# Patient Record
Sex: MALE | Race: WHITE | Employment: FULL TIME | URBAN - METROPOLITAN AREA
[De-identification: names, ages, dates, MRNs, and addresses within clinical notes are randomized per-mention and may not be internally consistent; named-entity substitution may affect disease eponyms.]

---

## 2022-11-30 ENCOUNTER — OFFICE VISIT (OUTPATIENT)
Dept: FAMILY MEDICINE CLINIC | Age: 32
End: 2022-11-30
Payer: COMMERCIAL

## 2022-11-30 VITALS
WEIGHT: 197 LBS | OXYGEN SATURATION: 98 % | DIASTOLIC BLOOD PRESSURE: 64 MMHG | BODY MASS INDEX: 29.18 KG/M2 | HEIGHT: 69 IN | HEART RATE: 112 BPM | SYSTOLIC BLOOD PRESSURE: 120 MMHG

## 2022-11-30 DIAGNOSIS — M25.50 CHRONIC PAIN OF MULTIPLE JOINTS: ICD-10-CM

## 2022-11-30 DIAGNOSIS — F17.200 VAPING NICOTINE DEPENDENCE, NON-TOBACCO PRODUCT: ICD-10-CM

## 2022-11-30 DIAGNOSIS — Z91.018 FOOD ALLERGY: Primary | ICD-10-CM

## 2022-11-30 DIAGNOSIS — G89.29 CHRONIC PAIN OF MULTIPLE JOINTS: ICD-10-CM

## 2022-11-30 DIAGNOSIS — Z87.891 FORMER SMOKER: ICD-10-CM

## 2022-11-30 DIAGNOSIS — R06.83 SNORING: ICD-10-CM

## 2022-11-30 DIAGNOSIS — Z97.3 WEARS GLASSES: ICD-10-CM

## 2022-11-30 PROCEDURE — 99205 OFFICE O/P NEW HI 60 MIN: CPT | Performed by: STUDENT IN AN ORGANIZED HEALTH CARE EDUCATION/TRAINING PROGRAM

## 2022-11-30 RX ORDER — FEXOFENADINE HCL 180 MG/1
TABLET ORAL DAILY
COMMUNITY
Start: 2017-11-30

## 2022-11-30 RX ORDER — FLUTICASONE PROPIONATE 50 MCG
2 SPRAY, SUSPENSION (ML) NASAL DAILY
Qty: 16 G | Refills: 0 | Status: SHIPPED | OUTPATIENT
Start: 2022-11-30

## 2022-11-30 SDOH — HEALTH STABILITY: PHYSICAL HEALTH: ON AVERAGE, HOW MANY DAYS PER WEEK DO YOU ENGAGE IN MODERATE TO STRENUOUS EXERCISE (LIKE A BRISK WALK)?: 5 DAYS

## 2022-11-30 SDOH — HEALTH STABILITY: PHYSICAL HEALTH: ON AVERAGE, HOW MANY MINUTES DO YOU ENGAGE IN EXERCISE AT THIS LEVEL?: 60 MIN

## 2022-11-30 ASSESSMENT — SOCIAL DETERMINANTS OF HEALTH (SDOH)
WITHIN THE LAST YEAR, HAVE TO BEEN RAPED OR FORCED TO HAVE ANY KIND OF SEXUAL ACTIVITY BY YOUR PARTNER OR EX-PARTNER?: NO
WITHIN THE LAST YEAR, HAVE YOU BEEN KICKED, HIT, SLAPPED, OR OTHERWISE PHYSICALLY HURT BY YOUR PARTNER OR EX-PARTNER?: NO
WITHIN THE LAST YEAR, HAVE YOU BEEN AFRAID OF YOUR PARTNER OR EX-PARTNER?: NO
WITHIN THE LAST YEAR, HAVE YOU BEEN HUMILIATED OR EMOTIONALLY ABUSED IN OTHER WAYS BY YOUR PARTNER OR EX-PARTNER?: NO

## 2022-11-30 ASSESSMENT — PATIENT HEALTH QUESTIONNAIRE - PHQ9
SUM OF ALL RESPONSES TO PHQ QUESTIONS 1-9: 2
SUM OF ALL RESPONSES TO PHQ9 QUESTIONS 1 & 2: 2
SUM OF ALL RESPONSES TO PHQ QUESTIONS 1-9: 2
1. LITTLE INTEREST OR PLEASURE IN DOING THINGS: 1
2. FEELING DOWN, DEPRESSED OR HOPELESS: 1

## 2022-11-30 NOTE — PATIENT INSTRUCTIONS
Can take Naproxen 1 tablet twice a day as needed for aches and pains. Can also consider taking ibuprofen 400mg every 6 hours as needed for pain    Please make an appointment with one of our [address], Dr. Wilton Rodriguez or Audrey Bender. They will work with you to develop a plan to improve your overall well-being by addressing any behavioral or mental health factors that are influencing your health. You can schedule your appointment just like you schedule your other appointments here, at the  or over the phone. Each appointment will be 30 minutes long, and you will typically be seen every 2-4 weeks. Your insurance should cover the visit, but you may owe a specialist copay. If you would prefer to be seen by a therapist more frequently, or for a longer visit, please ask your Primary Care Provider for a recommendation.

## 2022-11-30 NOTE — PROGRESS NOTES
[facility]  Establish care visit   2022    Loan Baez (:  1990) is a 34 y.o. male, here to establish care. Chief Complaint   Patient presents with    New Patient        ASSESSMENT/ PLAN  1. Food allergy  See below  Pt would like definitive testing for allergy. Discussed options of skin testing vs blood tests in general advantages and disadvantages. Will defer further management to specialist  - Viviana Feliciano MD, Allergy & Immunology, [place]    2. Chronic pain of multiple joints  Take ibuprofen 400mg q6h prn for pain or OTC naproxen bid prn. Pt to take his medication at home    3. Former smoker  4. Vaping nicotine dependence, non-tobacco product  Issues with oral fixation and hands more than the nicotine itself. Referred to behavioral health for further assistance. Can revisit discussion for NRT at this time. Pt is contemplative with NRT at this time. Did some MI myself about smoking. Suggested some healthier snacks and fidget cubes even but I think further therapy will be beneficial. Plus he is wanting ADHD diagnosis. No prior ADHD diagnosis. 5. Wears glasses  Has a spot on the sclera without blurry vision or visual changes. Has not seen eye in a while  - [facility], , Optometry, [facility]    6. Snoring  Likely 2/2 nasal congestion. Will benefit with STOP BANG if not improving and further maybe PSG  - fluticasone (FLONASE) 50 MCG/ACT nasal spray; 2 sprays by Each Nostril route daily  Dispense: 16 g; Refill: 0     Return in about 2 months (around 2023) for well adult. Education provided regarding visit today. See visit diagnoses, orders and follow up recommendations. Discussed probable diagnosis, test results if available in office today and management options with patient: patient agreed to a medical plan. Will contact patient for results.     Total Time Spent with Patient: 60 minutes, of which greater than 50% was spent on counseling or coordinating care. 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 Allergies   Allergen Reactions    Seasonal        History reviewed. No pertinent past medical history.     Patient Active Problem List   Diagnosis    Tobacco use disorder         Past Surgical History:   Procedure Laterality Date    HERNIA REPAIR         Social History     Socioeconomic History    Marital status: Single     Spouse name: Not on file    Number of children: Not on file    Years of education: Not on file    Highest education level: Not on file   Occupational History    Not on file   Tobacco Use    Smoking status: Former     Packs/day: 0.75     Types: Cigarettes     Start date: 8/4/2008     Quit date: 4/1/2019     Years since quitting: 3.6    Smokeless tobacco: Current    Tobacco comments:     E-cig (vaping) as alternative for cigarettes   Vaping Use    Vaping Use: Every day    Substances: Nicotine, Flavoring    Devices: Pre-filled pod   Substance and Sexual Activity    Alcohol use: Yes    Drug use: Yes     Types: Marijuana Shellye Burkitt)    Sexual activity: Not on file   Other Topics Concern    Not on file   Social History Narrative    Not on file     Social Determinants of Health     Financial Resource Strain: Not on file   Food Insecurity: Not on file   Transportation Needs: Not on file   Physical Activity: Sufficiently Active    Days of Exercise per Week: 5 days    Minutes of Exercise per Session: 60 min   Stress: Not on file   Social Connections: Not on file   Intimate Partner Violence: Not At Risk    Fear of Current or Ex-Partner: No    Emotionally Abused: No    Physically Abused: No    Sexually Abused: No   Housing Stability: Not on file        Family History   Problem Relation Age of Onset    Thyroid Disease Mother     Elevated Lipids Father     Cancer Father         skin cancer    No Known Problems Sister     No Known Problems Sister     No Known Problems Sister        PE  Vitals:    11/30/22 1106   BP: 120/64   Pulse: (!) 112   SpO2: 98%   Weight: 197 lb (89.4 kg)   Height: 5' 9\" (1.753 m)     Estimated body mass index is 29.09 kg/m² as calculated from the following:    Height as of this encounter: 5' 9\" (1.753 m). Weight as of this encounter: 197 lb (89.4 kg). Physical Exam  Vitals and nursing note reviewed. Constitutional:       Appearance: Normal appearance. He is well-developed. He is not ill-appearing or toxic-appearing. HENT:      Head: Normocephalic and atraumatic. Right Ear: Tympanic membrane and external ear normal.      Left Ear: Tympanic membrane and external ear normal.      Nose: Nose normal. No congestion. Mouth/Throat:      Mouth: Mucous membranes are moist.      Pharynx: Oropharynx is clear. No oropharyngeal exudate or posterior oropharyngeal erythema. Eyes:      General:         Right eye: No discharge. Left eye: No discharge. Extraocular Movements: Extraocular movements intact. Conjunctiva/sclera: Conjunctivae normal.      Pupils: Pupils are equal, round, and reactive to light. Comments: White 1cm brown spot on superolateral right sclera. No visual changes or nystagmus noted   Cardiovascular:      Rate and Rhythm: Normal rate and regular rhythm. Heart sounds: Normal heart sounds. No murmur heard. Pulmonary:      Effort: Pulmonary effort is normal. No respiratory distress. Breath sounds: Normal breath sounds. No wheezing or rales. Abdominal:      General: Bowel sounds are normal. There is no distension. Palpations: Abdomen is soft. Tenderness: There is no abdominal tenderness. There is no rebound. Musculoskeletal:         General: Normal range of motion. Cervical back: Normal range of motion. Lymphadenopathy:      Cervical: No cervical adenopathy. Skin:     General: Skin is warm and dry. Capillary Refill: Capillary refill takes less than 2 seconds. Neurological:      General: No focal deficit present. Mental Status: He is alert and oriented to person, place, and time. Mental status is at baseline. Deep Tendon Reflexes: Reflexes are normal and symmetric. Psychiatric:         Mood and Affect: Mood normal.         Behavior: Behavior normal.         Thought Content:  Thought content normal.         Judgment: Judgment normal.      Immunization History   Administered Date(s) Administered    COVID-19, MODERNA BLUE border, Primary or Immunocompromised, (age 12y+), IM, 100 mcg/0.5mL 04/19/2021    COVID-19, [place] Vaccine, Vaccine Unspecified 03/18/2021    Hepatitis B Ped/Adol (Engerix-B, Recombivax HB) 04/17/2000, 04/10/2001    Influenza, AFLURIA (age 1 yrs+), FLUZONE, (age 10 mo+), MDV, 0.5mL 02/21/2021    MMR 04/10/2001    Td (Adult), 5 Lf Tetanus Toxoid, Pf (Tenivac, Decavac) 05/16/2007    Td vaccine (adult) 04/17/2000, 06/08/2005    Tdap (Boostrix, Adacel) 03/19/2015       Health Maintenance   Topic Date Due    Varicella vaccine (1 of 2 - 2-dose childhood series) Never done    Depression Screen  Never done    HIV screen  Never done    Hepatitis C screen  Never done    COVID-19 Vaccine (3 - Booster) 06/14/2021    Flu vaccine (1) 08/01/2022    DTaP/Tdap/Td vaccine (5 - Td or Tdap) 03/19/2025    Hepatitis A vaccine  Aged Out    Hib vaccine  Aged Out    Meningococcal (ACWY) vaccine  Aged Out    Pneumococcal 0-64 years Vaccine  Aged Out       Portions of record reviewed for pertinent issues: active problem list,medication list,allergies,social history, health maintenance. Recent and past labs, tests and consults also reviewed. Recent or new meds also reviewed. Ghada Krishnan DO    This dictation was generated by voice recognition computer software. Although all attempts are made to edit the dictation for accuracy, there may be errors in the transcription that are not intended.

## 2022-12-20 ENCOUNTER — OFFICE VISIT (OUTPATIENT)
Dept: PSYCHOLOGY | Age: 32
End: 2022-12-20

## 2022-12-20 DIAGNOSIS — F41.8 ANXIETY WITH DEPRESSION: ICD-10-CM

## 2022-12-20 DIAGNOSIS — F90.0 ADHD (ATTENTION DEFICIT HYPERACTIVITY DISORDER), INATTENTIVE TYPE: Primary | ICD-10-CM

## 2022-12-20 ASSESSMENT — PATIENT HEALTH QUESTIONNAIRE - PHQ9
SUM OF ALL RESPONSES TO PHQ QUESTIONS 1-9: 8
5. POOR APPETITE OR OVEREATING: 1
8. MOVING OR SPEAKING SO SLOWLY THAT OTHER PEOPLE COULD HAVE NOTICED. OR THE OPPOSITE, BEING SO FIGETY OR RESTLESS THAT YOU HAVE BEEN MOVING AROUND A LOT MORE THAN USUAL: 1
2. FEELING DOWN, DEPRESSED OR HOPELESS: 1
6. FEELING BAD ABOUT YOURSELF - OR THAT YOU ARE A FAILURE OR HAVE LET YOURSELF OR YOUR FAMILY DOWN: 1
SUM OF ALL RESPONSES TO PHQ QUESTIONS 1-9: 8
9. THOUGHTS THAT YOU WOULD BE BETTER OFF DEAD, OR OF HURTING YOURSELF: 0
7. TROUBLE CONCENTRATING ON THINGS, SUCH AS READING THE NEWSPAPER OR WATCHING TELEVISION: 2
SUM OF ALL RESPONSES TO PHQ QUESTIONS 1-9: 8
4. FEELING TIRED OR HAVING LITTLE ENERGY: 1
1. LITTLE INTEREST OR PLEASURE IN DOING THINGS: 1
SUM OF ALL RESPONSES TO PHQ9 QUESTIONS 1 & 2: 2
3. TROUBLE FALLING OR STAYING ASLEEP: 0
SUM OF ALL RESPONSES TO PHQ QUESTIONS 1-9: 8

## 2022-12-20 ASSESSMENT — ANXIETY QUESTIONNAIRES
6. BECOMING EASILY ANNOYED OR IRRITABLE: 1
4. TROUBLE RELAXING: 1
5. BEING SO RESTLESS THAT IT IS HARD TO SIT STILL: 1
7. FEELING AFRAID AS IF SOMETHING AWFUL MIGHT HAPPEN: 1
2. NOT BEING ABLE TO STOP OR CONTROL WORRYING: 0
3. WORRYING TOO MUCH ABOUT DIFFERENT THINGS: 1
1. FEELING NERVOUS, ANXIOUS, OR ON EDGE: 1
GAD7 TOTAL SCORE: 6

## 2022-12-20 NOTE — PROGRESS NOTES
Behavioral Health Consultation  Amiar Isaacs M.A. Psychology Assistant  Malik Helms, Ph.D. Supervising Psychologist  12/20/2022  11:01 AM EST      Time spent with Patient: 50 minutes  This is patient's first [facility] appointment. Reason for Consult:    Chief Complaint   Patient presents with    Anxiety    Depression    Other     Smoking cessation    ADHD       Pt provided informed consent for the behavioral health program. Discussed with patient model of service to include the limits of confidentiality (i.e. abuse reporting, suicide intervention, etc.) and short-term intervention focused approach. Reviewed provision of services under supervision of licensed psychologist and obtained written consent. Pt indicated understanding. Feedback given to PCP. S:  Patient presents with concerns about ADHD, anxiety, depression, smoking cessation. Pt complains of these symptoms of ADHD: fails to give close attention to details or makes careless mistakes in school, work, or other activities, has difficulty sustaining attention in tasks or play activities, does not seem to listen when spoken to directly, has difficulty organizing tasks and activities, does not follow through on instructions and fails to finish schoolwork, chores, or duties in the workplace, loses things that are necessary for tasks and activities, is easily distracted by extraneous stimuli, is often forgetful in daily activities, avoids engaging in tasks that require sustained attention, and hyper-focusing . Reports difficulties in school dating back to 2nd grade (completing assignments, missing homework); impacted grades in high school. Did not complete bachelors degree due to difficulties balancing school/work obligations. Additionally reports historical difficulties in long-term planning, sleep dysfunction, personal hygiene. Reports fidgeting, feeling restless, talking excessively, waiting turn in conversation.  ADHD sx impacting work, personal relationships. Patient reports depressive symptoms, including depressed mood, deactivation, anhedonia, poor self-worth, and poor concentration/focus. Self-worth related to shape/weight concerns (gained weight during pandemic/after switching to a more stationary job). Pt reports symptoms of anxiety, including  worry, racing thoughts, irritability, poor concentration/focus, and restlessness. Reports past social anxiety but denies current sx. Pt also reports  grinding teeth, clenching jaw and muscle tension. Sx have been present for years. Sx are aggravated by lack of leisure activities (exacerbated by work-related stress, recent move). Patient finds relief from keeping lists/organized, keeping \"hands occupied\". Goals for treatment include improving focus/concentration, improving motivation. Patient lives with girlfriend, 2 dogs. 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 Daily routine is regular. Daily caffeine use (250 ml coffee w/ breakfast, 350 ml with lunch). 1 ml 6 mg vape fluid/day, daily alcohol use (1-2 beers/day) alcohol use, infrequent cannabis use. Denies illegal drug use. Patient spends 4 hours a day on social media or watching TV. There is no regular exercise. Social support is girlfriend (Jossie), friends/co-workers. Patient enjoys the following hobbies: music, playing guitar, politics, crafting/working with hands. Patient reports the following sleep difficulties: difficulties falling asleep. Family history is positive for depression, anxiety, suicide (aunt), OCD, ADHD (Dad). Reports past passive thoughts of death during periods of personal stress several years ago.  Denies current passive thoughts of death/active SI.    O:  MSE:    Appearance: good hygiene   Attitude: cooperative and friendly  Consciousness: alert  Orientation: oriented to person, place, time, general circumstance  Memory: recent and remote memory intact  Attention/Concentration: intact during session  Psychomotor Activity:normal  Eye Contact: normal and occasionally appeared distracted during consult  Speech: normal rate and volume, well-articulated  Mood: euthymic  Affect: euthymic  Perception: within normal limits  Thought Content: within normal limits  Thought Process: logical, coherent, goal-directed, and tangential  Insight: good  Judgment: intact  Ability to understand instructions: Yes  Ability to respond meaningfully: Yes  Morbid Ideation: no   Suicide Assessment: no suicidal ideation, plan, or intent  Homicidal Ideation: no    History:    Medications:   Current Outpatient Medications   Medication Sig Dispense Refill    fexofenadine (ALLEGRA) 180 MG tablet Take by mouth daily      fluticasone (FLONASE) 50 MCG/ACT nasal spray 2 sprays by Each Nostril route daily 16 g 0     No current facility-administered medications for this visit.      Social History:   Social History     Socioeconomic History    Marital status: Single     Spouse name: Not on file    Number of children: Not on file    Years of education: Not on file    Highest education level: Not on file   Occupational History    Not on file   Tobacco Use    Smoking status: Former     Packs/day: 0.75     Types: Cigarettes     Start date: 8/4/2008     Quit date: 4/1/2019     Years since quitting: 3.7    Smokeless tobacco: Current    Tobacco comments:     E-cig (vaping) as alternative for cigarettes   Vaping Use    Vaping Use: Every day    Substances: Nicotine, Flavoring    Devices: Pre-filled pod   Substance and Sexual Activity    Alcohol use: Yes    Drug use: Yes     Types: Marijuana Donkim Gamez)    Sexual activity: Not on file   Other Topics Concern    Not on file   Social History Narrative    Not on file     Social Determinants of Health     Financial Resource Strain: Not on file   Food Insecurity: Not on file   Transportation Needs: Not on file   Physical Activity: Sufficiently Active    Days of Exercise per Week: 5 days    Minutes of Exercise per Session: 60 min   Stress: Not on file   Social Connections: Not on file   Intimate Partner Violence: Not At Risk    Fear of Current or Ex-Partner: No    Emotionally Abused: No    Physically Abused: No    Sexually Abused: No   Housing Stability: Not on file     TOBACCO:   reports that he quit smoking about 3 years ago. His smoking use included cigarettes. He started smoking about 14 years ago. He smoked an average of .75 packs per day. He uses smokeless tobacco.  ETOH:   reports current alcohol use. Family History:   Family History   Problem Relation Age of Onset    Thyroid Disease Mother     Elevated Lipids Father     Cancer Father         skin cancer    No Known Problems Sister     No Known Problems Sister     No Known Problems Sister        A:  Administered PHQ-9 and AIMEE-7 (see below). Patient endorses mild symptoms of depression and mild symptoms of anxiety. Denies SI.    PHQ Scores 12/20/2022 11/30/2022   PHQ2 Score 2 2   PHQ9 Score 8 2     Interpretation of Total Score Depression Severity: 1-4 = Minimal depression, 5-9 = Mild depression, 10-14 = Moderate depression, 15-19 = Moderately severe depression, 20-27 = Severe depression     AIMEE 7 SCORE 12/20/2022   AIMEE-7 Total Score 6     Interpretation of AIMEE-7 score: 5-9 = mild anxiety, 10-14 = moderate anxiety, 15+ = severe anxiety. Recommend referral to behavioral health for scores 10 or greater. Diagnosis:    1. ADHD (attention deficit hyperactivity disorder), inattentive type    2. Anxiety with depression      No past medical history on file.     Plan:  Pt interventions:  Provided education on the use of medication to treat  ADHD, Established rapport, Conducted functional assessment, [place]-setting to identify pt's primary goals for [facility] visit / overall health, and Supportive techniques    Pt Behavioral Change Plan:   See Pt Instructions

## 2023-01-09 ENCOUNTER — OFFICE VISIT (OUTPATIENT)
Dept: FAMILY MEDICINE CLINIC | Age: 33
End: 2023-01-09

## 2023-01-09 VITALS
WEIGHT: 198 LBS | HEART RATE: 88 BPM | OXYGEN SATURATION: 98 % | DIASTOLIC BLOOD PRESSURE: 64 MMHG | BODY MASS INDEX: 29.33 KG/M2 | HEIGHT: 69 IN | SYSTOLIC BLOOD PRESSURE: 122 MMHG

## 2023-01-09 DIAGNOSIS — Z11.59 NEED FOR HEPATITIS C SCREENING TEST: ICD-10-CM

## 2023-01-09 DIAGNOSIS — F90.0 ATTENTION DEFICIT HYPERACTIVITY DISORDER (ADHD), PREDOMINANTLY INATTENTIVE TYPE: ICD-10-CM

## 2023-01-09 DIAGNOSIS — Z00.00 ENCOUNTER FOR WELL ADULT EXAM WITHOUT ABNORMAL FINDINGS: ICD-10-CM

## 2023-01-09 DIAGNOSIS — F41.8 ANXIETY WITH DEPRESSION: ICD-10-CM

## 2023-01-09 DIAGNOSIS — Z72.89 OTHER PROBLEMS RELATED TO LIFESTYLE: ICD-10-CM

## 2023-01-09 DIAGNOSIS — Z23 NEED FOR INFLUENZA VACCINATION: ICD-10-CM

## 2023-01-09 DIAGNOSIS — Z00.00 ENCOUNTER FOR WELL ADULT EXAM WITHOUT ABNORMAL FINDINGS: Primary | ICD-10-CM

## 2023-01-09 DIAGNOSIS — Z80.8 FAMILY HISTORY OF SKIN CANCER: ICD-10-CM

## 2023-01-09 DIAGNOSIS — F17.290 VAPING NICOTINE DEPENDENCE, TOBACCO PRODUCT: ICD-10-CM

## 2023-01-09 DIAGNOSIS — M77.8 TENDINITIS OF BOTH WRISTS: ICD-10-CM

## 2023-01-09 DIAGNOSIS — M65.4 DE QUERVAIN'S TENOSYNOVITIS, BILATERAL: ICD-10-CM

## 2023-01-09 LAB
A/G RATIO: 1.9 (ref 1.1–2.2)
ALBUMIN SERPL-MCNC: 4.5 G/DL (ref 3.4–5)
ALP BLD-CCNC: 63 U/L (ref 40–129)
ALT SERPL-CCNC: 42 U/L (ref 10–40)
ANION GAP SERPL CALCULATED.3IONS-SCNC: 14 MMOL/L (ref 3–16)
AST SERPL-CCNC: 22 U/L (ref 15–37)
BASOPHILS ABSOLUTE: 0.1 K/UL (ref 0–0.2)
BASOPHILS RELATIVE PERCENT: 1.3 %
BILIRUB SERPL-MCNC: 0.6 MG/DL (ref 0–1)
BUN BLDV-MCNC: 14 MG/DL (ref 7–20)
CALCIUM SERPL-MCNC: 9.9 MG/DL (ref 8.3–10.6)
CHLORIDE BLD-SCNC: 101 MMOL/L (ref 99–110)
CHOLESTEROL, FASTING: 165 MG/DL (ref 0–199)
CO2: 24 MMOL/L (ref 21–32)
CREAT SERPL-MCNC: 0.9 MG/DL (ref 0.9–1.3)
EOSINOPHILS ABSOLUTE: 0.1 K/UL (ref 0–0.6)
EOSINOPHILS RELATIVE PERCENT: 1.4 %
GFR SERPL CREATININE-BSD FRML MDRD: >60 ML/MIN/{1.73_M2}
GLUCOSE BLD-MCNC: 77 MG/DL (ref 70–99)
HCT VFR BLD CALC: 41.5 % (ref 40.5–52.5)
HDLC SERPL-MCNC: 65 MG/DL (ref 40–60)
HEMOGLOBIN: 14.3 G/DL (ref 13.5–17.5)
HEPATITIS C ANTIBODY INTERPRETATION: NORMAL
LDL CHOLESTEROL CALCULATED: 83 MG/DL
LYMPHOCYTES ABSOLUTE: 1.7 K/UL (ref 1–5.1)
LYMPHOCYTES RELATIVE PERCENT: 32 %
MAGNESIUM: 2.1 MG/DL (ref 1.8–2.4)
MCH RBC QN AUTO: 30.3 PG (ref 26–34)
MCHC RBC AUTO-ENTMCNC: 34.3 G/DL (ref 31–36)
MCV RBC AUTO: 88.4 FL (ref 80–100)
MONOCYTES ABSOLUTE: 0.6 K/UL (ref 0–1.3)
MONOCYTES RELATIVE PERCENT: 10.7 %
NEUTROPHILS ABSOLUTE: 3 K/UL (ref 1.7–7.7)
NEUTROPHILS RELATIVE PERCENT: 54.6 %
PDW BLD-RTO: 12.6 % (ref 12.4–15.4)
PLATELET # BLD: 273 K/UL (ref 135–450)
PMV BLD AUTO: 8.3 FL (ref 5–10.5)
POTASSIUM SERPL-SCNC: 4.7 MMOL/L (ref 3.5–5.1)
RBC # BLD: 4.7 M/UL (ref 4.2–5.9)
SODIUM BLD-SCNC: 139 MMOL/L (ref 136–145)
TOTAL PROTEIN: 6.9 G/DL (ref 6.4–8.2)
TRIGLYCERIDE, FASTING: 84 MG/DL (ref 0–150)
TSH REFLEX: 1.32 UIU/ML (ref 0.27–4.2)
VITAMIN B-12: 683 PG/ML (ref 211–911)
VITAMIN D 25-HYDROXY: 39 NG/ML
VLDLC SERPL CALC-MCNC: 17 MG/DL
WBC # BLD: 5.4 K/UL (ref 4–11)

## 2023-01-09 ASSESSMENT — PATIENT HEALTH QUESTIONNAIRE - PHQ9
SUM OF ALL RESPONSES TO PHQ QUESTIONS 1-9: 7
3. TROUBLE FALLING OR STAYING ASLEEP: 0
2. FEELING DOWN, DEPRESSED OR HOPELESS: 0
SUM OF ALL RESPONSES TO PHQ QUESTIONS 1-9: 7
6. FEELING BAD ABOUT YOURSELF - OR THAT YOU ARE A FAILURE OR HAVE LET YOURSELF OR YOUR FAMILY DOWN: 0
7. TROUBLE CONCENTRATING ON THINGS, SUCH AS READING THE NEWSPAPER OR WATCHING TELEVISION: 2
SUM OF ALL RESPONSES TO PHQ QUESTIONS 1-9: 7
1. LITTLE INTEREST OR PLEASURE IN DOING THINGS: 1
4. FEELING TIRED OR HAVING LITTLE ENERGY: 1
10. IF YOU CHECKED OFF ANY PROBLEMS, HOW DIFFICULT HAVE THESE PROBLEMS MADE IT FOR YOU TO DO YOUR WORK, TAKE CARE OF THINGS AT HOME, OR GET ALONG WITH OTHER PEOPLE: 1
SUM OF ALL RESPONSES TO PHQ QUESTIONS 1-9: 7
8. MOVING OR SPEAKING SO SLOWLY THAT OTHER PEOPLE COULD HAVE NOTICED. OR THE OPPOSITE, BEING SO FIGETY OR RESTLESS THAT YOU HAVE BEEN MOVING AROUND A LOT MORE THAN USUAL: 3
5. POOR APPETITE OR OVEREATING: 0
SUM OF ALL RESPONSES TO PHQ9 QUESTIONS 1 & 2: 1

## 2023-01-09 ASSESSMENT — ANXIETY QUESTIONNAIRES
6. BECOMING EASILY ANNOYED OR IRRITABLE: 0
4. TROUBLE RELAXING: 1
5. BEING SO RESTLESS THAT IT IS HARD TO SIT STILL: 1
IF YOU CHECKED OFF ANY PROBLEMS ON THIS QUESTIONNAIRE, HOW DIFFICULT HAVE THESE PROBLEMS MADE IT FOR YOU TO DO YOUR WORK, TAKE CARE OF THINGS AT HOME, OR GET ALONG WITH OTHER PEOPLE: SOMEWHAT DIFFICULT
GAD7 TOTAL SCORE: 4
2. NOT BEING ABLE TO STOP OR CONTROL WORRYING: 0
3. WORRYING TOO MUCH ABOUT DIFFERENT THINGS: 1
7. FEELING AFRAID AS IF SOMETHING AWFUL MIGHT HAPPEN: 0
1. FEELING NERVOUS, ANXIOUS, OR ON EDGE: 1

## 2023-01-09 ASSESSMENT — ENCOUNTER SYMPTOMS
VOMITING: 0
NAUSEA: 0
SHORTNESS OF BREATH: 0
BLOOD IN STOOL: 0

## 2023-01-09 NOTE — PATIENT INSTRUCTIONS
Please set up an appointment with Nasima Quezada NP   2. Consider buying a cock-up wrist splint       Advance Directives: Care Instructions  Overview  An advance directive is a legal way to state your wishes at the end of your life. It tells your family and your doctor what to do if you can't say what you want. There are two main types of advance directives. You can change them any time your wishes change. Living will. This form tells your family and your doctor your wishes about life support and other treatment. The form is also called a declaration. Medical power of . This form lets you name a person to make treatment decisions for you when you can't speak for yourself. This person is called a health care agent (health care proxy, health care surrogate). The form is also called a durable power of  for health care. If you do not have an advance directive, decisions about your medical care may be made by a family member, or by a doctor or a  who doesn't know you. It may help to think of an advance directive as a gift to the people who care for you. If you have one, they won't have to make tough decisions by themselves. For more information, including forms for your state, see the [address] website (www.caringinfo.org/planning/advance-directives/). Follow-up care is a key part of your treatment and safety. Be sure to make and go to all appointments, and call your doctor if you are having problems. It's also a good idea to know your test results and keep a list of the medicines you take. What should you include in an advance directive? Many states have a unique advance directive form. (It may ask you to address specific issues.) Or you might use a universal form that's approved by many states. If your form doesn't tell you what to address, it may be hard to know what to include in your advance directive. Use the questions below to help you get started.   Who do you want to make decisions about your medical care if you are not able to? What life-support measures do you want if you have a serious illness that gets worse over time or can't be cured? What are you most afraid of that might happen? (Maybe you're afraid of having pain, losing your independence, or being kept alive by machines.)  Where would you prefer to die? (Your home? A hospital? A nursing home?)  Do you want to donate your organs when you die? Do you want certain Zoroastrianism practices performed before you die? When should you call for help? Be sure to contact your doctor if you have any questions. Where can you learn more? Go to http://www.de la rosa.com/ and enter R264 to learn more about \"Advance Directives: Care Instructions. \"  Current as of: June 16, 2022               Content Version: 13.5  © 3650-1320 [employer]. Care instructions adapted under license by [facility] ([facility]). If you have questions about a medical condition or this instruction, always ask your healthcare professional. [address] any warranty or liability for your use of this information.

## 2023-01-09 NOTE — PROGRESS NOTES
Well Adult Note  Name: Sergio Narayan Date: 2023   MRN: [id number] Sex: Male   Age: 34 y.o. Ethnicity: Non- / Non    : 1990 Race: White (non-)      [place] is here for well adult exam.  History:  Past Medical History:   Diagnosis Date    Anxiety with depression 2023    Attention deficit hyperactivity disorder (ADHD), predominantly inattentive type 2023     Mood - recently established with behavioral health, 23    Concern for ADHD - no official diagnosis, has never been on medications    Former smoker/current vaper 1ml liquid per day    Ian wrist/thumb pain - worsening x5 years, aleve bid improves pain    Review of Systems   Constitutional:  Negative for chills and fever. HENT:  Negative for congestion. Eyes:  Negative for visual disturbance. Respiratory:  Negative for shortness of breath. Cardiovascular:  Negative for chest pain. Gastrointestinal:  Negative for blood in stool, nausea and vomiting. Genitourinary:  Negative for frequency and hematuria. Neurological:  Negative for weakness. Psychiatric/Behavioral:  Negative for dysphoric mood and suicidal ideas. The patient is not nervous/anxious. Allergies   Allergen Reactions    Seasonal        Prior to Visit Medications    Medication Sig Taking?  Authorizing Provider   Naproxen Sodium (ALEVE PO) Take by mouth Yes Historical Provider, MD   fexofenadine (ALLEGRA) 180 MG tablet Take by mouth daily Yes Historical Provider, MD   fluticasone (FLONASE) 50 MCG/ACT nasal spray 2 sprays by Each Nostril route daily Yes Ghada Krishnan DO         Past Medical History:   Diagnosis Date    Anxiety with depression 2023    Attention deficit hyperactivity disorder (ADHD), predominantly inattentive type 2023       Past Surgical History:   Procedure Laterality Date    HERNIA REPAIR         Family History   Problem Relation Age of Onset    Thyroid Disease Mother     Elevated Lipids Father Cancer Father         skin cancer    No Known Problems Sister     No Known Problems Sister     No Known Problems Sister        Social History     Tobacco Use    Smoking status: Former     Packs/day: 0.75     Types: Cigarettes     Start date: 8/4/2008     Quit date: 4/1/2019     Years since quitting: 3.7    Smokeless tobacco: Current    Tobacco comments:     E-cig (vaping) as alternative for cigarettes   Vaping Use    Vaping Use: Every day    Substances: Nicotine, Flavoring    Devices: Pre-filled pod   Substance Use Topics    Alcohol use: Yes    Drug use: Yes     Types: Marijuana (Weed)       Objective   /64   Pulse 88   Ht 5' 9\" (1.753 m)   Wt 198 lb (89.8 kg)   SpO2 98%   BMI 29.24 kg/m²   Wt Readings from Last 3 Encounters:   01/09/23 198 lb (89.8 kg)   11/30/22 197 lb (89.4 kg)     There were no vitals filed for this visit. Physical Exam  Vitals and nursing note reviewed. Constitutional:       General: He is not in acute distress. Appearance: Normal appearance. He is normal weight. He is not ill-appearing. HENT:      Head: Normocephalic and atraumatic. Right Ear: External ear normal.      Left Ear: External ear normal.   Cardiovascular:      Rate and Rhythm: Normal rate and regular rhythm. Pulses: Normal pulses. Heart sounds: Normal heart sounds. Pulmonary:      Effort: Pulmonary effort is normal.      Breath sounds: Normal breath sounds. Abdominal:      General: Bowel sounds are normal.      Palpations: Abdomen is soft. Tenderness: There is no abdominal tenderness. There is no guarding. Musculoskeletal:         General: Normal range of motion. Cervical back: Normal range of motion and neck supple. No tenderness. Lymphadenopathy:      Cervical: No cervical adenopathy. Skin:     General: Skin is warm. Capillary Refill: Capillary refill takes less than 2 seconds. Neurological:      General: No focal deficit present.       Mental Status: He is alert and oriented to person, place, and time. Mental status is at baseline. Sensory: No sensory deficit. Motor: No weakness. Comments: Neg tinels and phalens test bilaterally. Neuropathy on all 5 fingers whitney. Positive Finkelsteins L>R with decreased ROM whitney with extension   Psychiatric:         Mood and Affect: Mood normal.         Behavior: Behavior normal.         Thought Content: Thought content normal.         Judgment: Judgment normal.         Assessment   Plan   1. Encounter for well adult exam without abnormal findings  - Health maintenance reviewed and addressed  - Cardiovascular disease risk factors reviewed:   HTN screen: BP normotensive. Obesity screening - Wt overweight with BMI 29. Discussed caloric restriction, increased exercise and the health benefits of weight loss. - Encouraged continued EtOH, tobacco, Drug cessation, Intimate partner violence screening performed.  - STI screening declined. Safe sex discussed including contraception   - Depression and anxiety screening performed   - Comprehensive Metabolic Panel; Future  - Lipid, Fasting; Future    2. Attention deficit hyperactivity disorder (ADHD), predominantly inattentive type  Suspected given history. Self-reported adult ADHD questionnaire positive for both inattention and hyperactivity, more on inattention. Already referred to psychotherapy for assistance with diagnosis. No diagnosed history in the past when he was younger. Never been on medications. Also referred today to psychiatry nurse practitioner for assistance with management.     3. Anxiety with depression  - PHQ-9 administered   PHQ Scores 1/9/2023 12/20/2022 11/30/2022   PHQ2 Score 1 2 2   PHQ9 Score 7 8 2     Interpretation of Total Score Depression Severity: 1-4 = Minimal depression, 5-9 = Mild depression, 10-14 = Moderate depression, 15-19 = Moderately severe depression, 20-27 = Severe depression  AIMEE 7 SCORE 1/9/2023 12/20/2022   AIMEE-7 Total Score 4 6 Interpretation of AIMEE-7 score: 5-9 = mild anxiety, 10-14 = moderate anxiety, 15+ = severe anxiety. Recommend referral to behavioral health for scores 10 or greater.    -We will obtain TSH, Vitamin B12, Vitamin D levels, HIV, VDRL, CBC, Mag today  -Continue behavioral health appointments  - Magnesium; Future  - Syphilis Antibody Cascading Reflex; Future  - Vitamin D 25 Hydroxy; Future  - Vitamin B12; Future  - CBC with Auto Differential; Future  - HIV Screen; Future  - TSH with Reflex; Future  - Comprehensive Metabolic Panel; Future  - Lipid, Fasting; Future    4. Family history of skin cancer  Discussed screening regularly particularly with new lesions    5. Need for influenza vaccination  - Influenza, FLUCELVAX, (age 10 mo+), IM, Preservative Free, 0.5 mL    6. Other problems related to lifestyle  - Hepatitis C Antibody; Future    7. Need for hepatitis C screening test  - Hepatitis C Antibody; Future    8. Tendinitis of both wrists  See exam above. Discussed refraining from carrying the weight of cell phone solely on the wrist while using in bed. Patient to obtain cock-up splints online and discussed how to use it. Continue over-the-counter analgesics without going over the max dose. We will also refer to physical therapy. - [facility]    9. De Quervain's tenosynovitis, bilateral  Likely secondary to work and electronic usage. Trial of cock-up splints and NSAIDs.  - [facility]    10. Vaping nicotine dependence, tobacco product  Vaping cessation discussed. Due to time limitations and accumulated complaints today, unable to fully discuss. At this time patient is precontemplative on making a behavioral change on this.   We will readdress with time permitting at next visit    Personalized Preventive Plan   Current Health Maintenance Status  Immunization History   Administered Date(s) Administered    COVID-19, [address], Primary or Immunocompromised, (age 12y+), IM, 100 mcg/0.5mL 04/19/2021    COVID-19, [place] Vaccine, Vaccine Unspecified 03/18/2021    Hepatitis B Ped/Adol (Engerix-B, Recombivax HB) 04/17/2000, 04/10/2001    Influenza, AFLURIA (age 1 yrs+), FLUZONE, (age 10 mo+), MDV, 0.5mL 02/21/2021    Influenza, FLUCELVAX, (age 10 mo+), MDCK, PF, 0.5mL 01/09/2023    MMR 04/10/2001    Td (Adult), 5 Lf Tetanus Toxoid, Pf (Tenivac, Decavac) 05/16/2007    Td vaccine (adult) 04/17/2000, 06/08/2005    Tdap (Boostrix, Adacel) 03/19/2015        Health Maintenance   Topic Date Due    Varicella vaccine (1 of 2 - 2-dose childhood series) Never done    HIV screen  Never done    Hepatitis C screen  Never done    COVID-19 Vaccine (3 - Booster) 06/14/2021    Depression Monitoring  12/20/2023    DTaP/Tdap/Td vaccine (5 - Td or Tdap) 03/19/2025    Flu vaccine  Completed    Hepatitis A vaccine  Aged Out    Hib vaccine  Aged Out    Meningococcal (ACWY) vaccine  Aged Out    Pneumococcal 0-64 years Vaccine  Aged Out     Recommendations for [employer] Due: see orders and patient instructions/AVS.    Return in about 1 year (around 1/9/2024). Advance Care Planning   Advanced Care Planning: Discussed the patients choices for care and treatment in case of a health event that adversely affects decision-making abilities. Also discussed the patients long-term treatment options. Reviewed with the patient the appropriate state-specific advance directive documents. Reviewed the process of designating a competent adult as an Agent (or -in-fact) that could take make health care decisions for the patient if incompetent. Patient was asked to complete the declaration forms, if they have not already, either acknowledge the forms by a public notary or an eligible witness and provide a signed copy to the practice office.   Time spent (minutes): 5     Tobacco Cessation Counseling: Patient advised about behavior change, including information about personal health harms, usage of appropriate cessation measures and benefits of cessation.   Time spent (minutes): 5

## 2023-01-10 ENCOUNTER — OFFICE VISIT (OUTPATIENT)
Dept: FAMILY MEDICINE CLINIC | Age: 33
End: 2023-01-10
Payer: COMMERCIAL

## 2023-01-10 VITALS
HEIGHT: 69 IN | OXYGEN SATURATION: 98 % | WEIGHT: 198 LBS | SYSTOLIC BLOOD PRESSURE: 110 MMHG | BODY MASS INDEX: 29.33 KG/M2 | DIASTOLIC BLOOD PRESSURE: 60 MMHG | HEART RATE: 89 BPM

## 2023-01-10 DIAGNOSIS — F90.0 ATTENTION DEFICIT HYPERACTIVITY DISORDER (ADHD), PREDOMINANTLY INATTENTIVE TYPE: ICD-10-CM

## 2023-01-10 DIAGNOSIS — M22.2X1 PATELLOFEMORAL SYNDROME OF BOTH KNEES: Primary | ICD-10-CM

## 2023-01-10 DIAGNOSIS — F41.8 ANXIETY WITH DEPRESSION: ICD-10-CM

## 2023-01-10 DIAGNOSIS — M25.50 CHRONIC PAIN OF MULTIPLE JOINTS: ICD-10-CM

## 2023-01-10 DIAGNOSIS — M22.2X2 PATELLOFEMORAL SYNDROME OF BOTH KNEES: Primary | ICD-10-CM

## 2023-01-10 DIAGNOSIS — G89.29 CHRONIC PAIN OF MULTIPLE JOINTS: ICD-10-CM

## 2023-01-10 LAB
HIV AG/AB: NORMAL
HIV ANTIGEN: NORMAL
HIV-1 ANTIBODY: NORMAL
HIV-2 AB: NORMAL
TOTAL SYPHILLIS IGG/IGM: NORMAL

## 2023-01-10 PROCEDURE — 99215 OFFICE O/P EST HI 40 MIN: CPT | Performed by: STUDENT IN AN ORGANIZED HEALTH CARE EDUCATION/TRAINING PROGRAM

## 2023-01-10 NOTE — PROGRESS NOTES
[facility] Medicine  1/10/2023    Israel Coronado (:  1990) is a 34 y.o. male, here for evaluation of the following medical concerns:    Chief Complaint   Patient presents with    Follow-up        ASSESSMENT/ PLAN  1. Patellofemoral syndrome of both knees  Recommend activity modification and incorporating stretching and maybe even yoga with daily activities. Given positive Trendelenburg bilaterally with compensation, suspect weakness in bilateral gluteus, and hamstrings. Already referred to physical therapy yesterday for wrist pain. However patient would also benefit with strengthening glutes exercises for long term pain relief  - [facility] - [facility]    2. Chronic pain of multiple joints  As above  Increase naproxen to prescription dose 2 tablets twice a day daily for pain relief. Also discussed importance of strengthening exercises with guidance from physical therapy as well as home exercises  - [facility] - [facility]    3. Anxiety with depression  Already established with behavioral health. Lab results discussed today for reversible causes of mood disorder. Continue psychotherapy    4. Attention deficit hyperactivity disorder (ADHD), predominantly inattentive type  Was just assessed recently. Already referred to psychiatry for ADHD management. Total Time Spent with Patient: 45 minutes, of which greater than 50% was spent on counseling or coordinating care. Return in about 2 months (around 3/10/2023) for chronic med mngt. Education provided regarding visit today. See visit diagnoses, orders and follow up  recommendations. Portions of record reviewed for pertinent issues: active problem list,medication list,allergies,social history, health maintenance. Discussed probable diagnosis, test results if available in office today and management options with patient: patient agreed to a medical plan.         HPI    Pt brought giant list of concerns    Chronic pain of multiple joints  - knees: reports grinding after prolonged use since 2013, work boots tends to get worn down faster. Worsening. Pain with going up and down the stairs. Unable to pin point. Pain intermittent but does not last only until he gets up for example. Reports some stiffness.  - shoulders: reports stiffness and lots of popping. X2 years. Unchanged since onset. Certain motions may elicit pain. R handed. Concern if he damaged his shoulders  - back: since age 22yo. Feels 2/2 overuse. Pinching pain between shoulder blades although intermittently on back. Lasts a few hours or days. Does a lot of lifting at work up to 70lbs required for job. No fevers, weight loss, bowel or bladder problems    Takes 1 aleve tablet bid for pain. No trauma on all areas of concern. Allergies - already referred to allergist, Flonase helps. Pt forgot to call     Anxiety/depression - already established with behavioral health and referred to psychiatry for ADHD med mngt      ROS  Review of Systems   Psychiatric/Behavioral:  Negative for self-injury and suicidal ideas. HISTORIES  Current Outpatient Medications on File Prior to Visit   Medication Sig Dispense Refill    Naproxen Sodium (ALEVE PO) Take by mouth      fexofenadine (ALLEGRA) 180 MG tablet Take by mouth daily      fluticasone (FLONASE) 50 MCG/ACT nasal spray 2 sprays by Each Nostril route daily 16 g 0     No current facility-administered medications on file prior to visit.       Past Medical History:   Diagnosis Date    Anxiety with depression 1/9/2023    Attention deficit hyperactivity disorder (ADHD), predominantly inattentive type 1/9/2023     Patient Active Problem List   Diagnosis    Vaping nicotine dependence, tobacco product    Attention deficit hyperactivity disorder (ADHD), predominantly inattentive type    Anxiety with depression    De Quervain's tenosynovitis, bilateral    Tendinitis of both wrists       PE  Vitals:    01/10/23 0838   BP: 110/60   Pulse: 89   SpO2: 98%   Weight: 198 lb (89.8 kg)   Height: 5' 9\" (1.753 m)     Estimated body mass index is 29.24 kg/m² as calculated from the following:    Height as of this encounter: 5' 9\" (1.753 m). Weight as of this encounter: 198 lb (89.8 kg). Physical Exam  Vitals and nursing note reviewed. Constitutional:       General: He is not in acute distress. Appearance: Normal appearance. He is normal weight. He is not ill-appearing. HENT:      Head: Normocephalic and atraumatic. Right Ear: External ear normal.      Left Ear: External ear normal.   Abdominal:      Tenderness: There is no right CVA tenderness or left CVA tenderness. Musculoskeletal:         General: Tenderness present. No swelling. Cervical back: Normal range of motion and neck supple. No tenderness. Right lower leg: No edema. Left lower leg: No edema. Comments: No spinal tenderness to palpation. Bilateral paraspinal hypertrophy. Knee: No tenderness to palpation on bilateral hips. Positive Trendelenburg sign bilaterally with compensation. negative Thessaly sign bilaterally. Negative Lachman and posterior drawer test.  Negative Tiffanie and varus and valgus test bilaterally. No joint tenderness to palpation. Patient reports stretching of popliteal muscles with knee flexion bilaterally   Lymphadenopathy:      Cervical: No cervical adenopathy. Neurological:      General: No focal deficit present. Mental Status: He is alert and oriented to person, place, and time. Mental status is at baseline. Sensory: No sensory deficit. Motor: No weakness. Gait: Gait normal.      Deep Tendon Reflexes: Reflexes normal.   Psychiatric:         Mood and Affect: Mood normal.         Behavior: Behavior normal.         Thought Content: Thought content normal.         Judgment: Judgment normal.       Ghada Krishnan, DO    This dictation was generated by voice recognition computer software. Although all attempts are made to edit the dictation for accuracy, there may be errors in the transcription that are not intended.

## 2023-01-10 NOTE — PATIENT INSTRUCTIONS
Schedule an appointment with the allergist  [address] [place] [address] [address], [place]   [address], [address], [address]   Phone: [phone]   Fax: [phone]    2. Schedule an appointment with physical therapy for your wrist/thumb and patellofemoral pain    3. You can increase your Aleve to 2 tablets twice a day. Take this with food.  Do not take other antiinflammatory medications like Ibuprofen when you are taking Aleve

## 2023-01-17 ENCOUNTER — OFFICE VISIT (OUTPATIENT)
Dept: PSYCHOLOGY | Age: 33
End: 2023-01-17
Payer: COMMERCIAL

## 2023-01-17 DIAGNOSIS — F41.8 ANXIETY WITH DEPRESSION: ICD-10-CM

## 2023-01-17 DIAGNOSIS — F90.0 ADHD (ATTENTION DEFICIT HYPERACTIVITY DISORDER), INATTENTIVE TYPE: Primary | ICD-10-CM

## 2023-01-17 PROCEDURE — 90832 PSYTX W PT 30 MINUTES: CPT | Performed by: PSYCHOLOGIST

## 2023-01-17 ASSESSMENT — PATIENT HEALTH QUESTIONNAIRE - PHQ9
7. TROUBLE CONCENTRATING ON THINGS, SUCH AS READING THE NEWSPAPER OR WATCHING TELEVISION: 2
SUM OF ALL RESPONSES TO PHQ QUESTIONS 1-9: 5
3. TROUBLE FALLING OR STAYING ASLEEP: 0
4. FEELING TIRED OR HAVING LITTLE ENERGY: 1
9. THOUGHTS THAT YOU WOULD BE BETTER OFF DEAD, OR OF HURTING YOURSELF: 0
SUM OF ALL RESPONSES TO PHQ QUESTIONS 1-9: 5
2. FEELING DOWN, DEPRESSED OR HOPELESS: 1
SUM OF ALL RESPONSES TO PHQ9 QUESTIONS 1 & 2: 1
SUM OF ALL RESPONSES TO PHQ QUESTIONS 1-9: 5
SUM OF ALL RESPONSES TO PHQ QUESTIONS 1-9: 5
8. MOVING OR SPEAKING SO SLOWLY THAT OTHER PEOPLE COULD HAVE NOTICED. OR THE OPPOSITE, BEING SO FIGETY OR RESTLESS THAT YOU HAVE BEEN MOVING AROUND A LOT MORE THAN USUAL: 1
1. LITTLE INTEREST OR PLEASURE IN DOING THINGS: 0
5. POOR APPETITE OR OVEREATING: 0
6. FEELING BAD ABOUT YOURSELF - OR THAT YOU ARE A FAILURE OR HAVE LET YOURSELF OR YOUR FAMILY DOWN: 0

## 2023-01-17 ASSESSMENT — ANXIETY QUESTIONNAIRES
2. NOT BEING ABLE TO STOP OR CONTROL WORRYING: 1
3. WORRYING TOO MUCH ABOUT DIFFERENT THINGS: 1
GAD7 TOTAL SCORE: 5
1. FEELING NERVOUS, ANXIOUS, OR ON EDGE: 1
6. BECOMING EASILY ANNOYED OR IRRITABLE: 1
4. TROUBLE RELAXING: 1
7. FEELING AFRAID AS IF SOMETHING AWFUL MIGHT HAPPEN: 0
5. BEING SO RESTLESS THAT IT IS HARD TO SIT STILL: 0

## 2023-01-17 NOTE — PROGRESS NOTES
Behavioral Health Consultation  Joey Nuñez M.A. Psychology Assistant  Sheryl Sanz, Ph.D. Supervising Psychologist  1/17/2023  9:38 AM EST      Time spent with Patient: 28 minutes  This is patient's second  [facility] appointment. Reason for Consult:    Chief Complaint   Patient presents with    ADHD    Anxiety    Stress       Feedback given to PCP. S:  Pt consulting with PCP regarding medication for ADHD sx management; PCP providing referrals re: initial prescription and then intends to manage subsequent adjustments thereafter. Processed recent conflict with partner's family over the holidays; placing strain on relationship with partner. Discussed patterns of communication with partner. Conducted functional analysis of ADHD and anxiety. Pt wants to prioritize tx focus on ADHD sx as difficulties w/ attention and concentration appear to exacerbate worry, anxiety. Reviewed behavioral strategies for ADHD sx management. Discussed and processed emotions, worry re: health anxiety and navigating upcoming appointments. Validated Pt concerns, encouraged continued communication with his PCP.     O:  MSE:    Appearance: good hygiene   Attitude: cooperative and friendly  Consciousness: alert  Orientation: oriented to person, place, time, general circumstance  Memory: recent and remote memory intact  Attention/Concentration: intact during session  Psychomotor Activity:normal  Eye Contact: normal  Speech: normal rate and volume, well-articulated  Mood: euthymic  Affect: euthymic and constricted  Perception: within normal limits  Thought Content: within normal limits  Thought Process: logical, coherent and goal-directed  Insight: good  Judgment: intact  Ability to understand instructions: Yes  Ability to respond meaningfully: Yes  Morbid Ideation: no   Suicide Assessment: no suicidal ideation, plan, or intent  Homicidal Ideation: no    History:    Medications:   Current Outpatient Medications   Medication Sig Dispense Refill Naproxen Sodium (ALEVE PO) Take by mouth      fexofenadine (ALLEGRA) 180 MG tablet Take by mouth daily      fluticasone (FLONASE) 50 MCG/ACT nasal spray 2 sprays by Each Nostril route daily 16 g 0     No current facility-administered medications for this visit. Social History:   Social History     Socioeconomic History    Marital status: Single     Spouse name: Not on file    Number of children: Not on file    Years of education: Not on file    Highest education level: Not on file   Occupational History    Not on file   Tobacco Use    Smoking status: Former     Packs/day: 0.75     Types: Cigarettes     Start date: 8/4/2008     Quit date: 4/1/2019     Years since quitting: 3.8    Smokeless tobacco: Current    Tobacco comments:     E-cig (vaping) as alternative for cigarettes   Vaping Use    Vaping Use: Every day    Substances: Nicotine, Flavoring    Devices: Pre-filled pod   Substance and Sexual Activity    Alcohol use: Yes    Drug use: Yes     Types: Marijuana Fronie Darrian)    Sexual activity: Yes     Partners: Female     Birth control/protection: Condom   Other Topics Concern    Not on file   Social History Narrative    Not on file     Social Determinants of Health     Financial Resource Strain: Not on file   Food Insecurity: Not on file   Transportation Needs: Not on file   Physical Activity: Sufficiently Active    Days of Exercise per Week: 5 days    Minutes of Exercise per Session: 60 min   Stress: Not on file   Social Connections: Not on file   Intimate Partner Violence: Not At Risk    Fear of Current or Ex-Partner: No    Emotionally Abused: No    Physically Abused: No    Sexually Abused: No   Housing Stability: Not on file     TOBACCO:   reports that he quit smoking about 3 years ago. His smoking use included cigarettes. He started smoking about 14 years ago. He smoked an average of .75 packs per day. He uses smokeless tobacco.  ETOH:   reports current alcohol use.   Family History:   Family History   Problem Relation Age of Onset    Thyroid Disease Mother         thyroid storm    Elevated Lipids Father     Cancer Father         skin cancer    No Known Problems Sister     No Known Problems Sister     No Known Problems Sister        A:  Administered PHQ-9 and AIMEE-7 (see below). Patient endorses mid symptoms of depression and mild symptoms of anxiety. Denies SI.     PHQ Scores 1/17/2023 1/9/2023 12/20/2022 11/30/2022   PHQ2 Score 1 1 2 2   PHQ9 Score 5 7 8 2     Interpretation of Total Score Depression Severity: 1-4 = Minimal depression, 5-9 = Mild depression, 10-14 = Moderate depression, 15-19 = Moderately severe depression, 20-27 = Severe depression     AIMEE 7 SCORE 1/17/2023 1/9/2023 12/20/2022   AIMEE-7 Total Score 5 4 6     Interpretation of AIMEE-7 score: 5-9 = mild anxiety, 10-14 = moderate anxiety, 15+ = severe anxiety. Recommend referral to behavioral health for scores 10 or greater. Diagnosis:    1. ADHD (attention deficit hyperactivity disorder), inattentive type    2.  Anxiety with depression          Diagnosis Date    Anxiety with depression 1/9/2023    Attention deficit hyperactivity disorder (ADHD), predominantly inattentive type 1/9/2023       Plan:  Pt interventions:  Provided handout on  ADHD, Provided education, Discussed self-care (sleep, nutrition, rewarding activities, social support, exercise), Motivational Interviewing to determine importance and readiness for change, Discussed potential barriers to change, Conducted functional assessment, and [place]-setting to identify pt's primary goals for [facility] visit / overall health    Pt Behavioral Change Plan:   See Pt Instructions

## 2023-01-25 ENCOUNTER — HOSPITAL ENCOUNTER (OUTPATIENT)
Dept: PHYSICAL THERAPY | Age: 33
Setting detail: THERAPIES SERIES
End: 2023-01-25
Payer: COMMERCIAL

## 2023-01-26 ENCOUNTER — TELEMEDICINE (OUTPATIENT)
Dept: FAMILY MEDICINE CLINIC | Age: 33
End: 2023-01-26
Payer: COMMERCIAL

## 2023-01-26 DIAGNOSIS — J02.9 SORE THROAT: Primary | ICD-10-CM

## 2023-01-26 LAB — S PYO AG THROAT QL: NORMAL

## 2023-01-26 PROCEDURE — 99213 OFFICE O/P EST LOW 20 MIN: CPT | Performed by: NURSE PRACTITIONER

## 2023-01-26 PROCEDURE — 87880 STREP A ASSAY W/OPTIC: CPT | Performed by: NURSE PRACTITIONER

## 2023-01-26 ASSESSMENT — ENCOUNTER SYMPTOMS
RESPIRATORY NEGATIVE: 1
SORE THROAT: 1
GASTROINTESTINAL NEGATIVE: 1

## 2023-01-26 NOTE — PROGRESS NOTES
2023    TELEHEALTH EVALUATION -- Audio/Visual (During KXYWJ-84 public health emergency)    HPI:    Woody Hart (:  1990) has requested an audio/video evaluation for the following concern(s):    Patient presents today with concerns of sore throat and head congestion. He states he works at [employer] and has had strep twice in the past 2 years. He noticed some bloody discharge out the left nostril yesterday. He has more pain on the left side of his throat. He states the left tonsil is swollen. His temp is normal. He reports his home covid test was negative. Review of Systems   Constitutional: Negative. HENT:  Positive for congestion and sore throat. Respiratory: Negative. Cardiovascular: Negative. Gastrointestinal: Negative. Musculoskeletal: Negative. Skin: Negative. Neurological: Negative. Psychiatric/Behavioral: Negative. Prior to Visit Medications    Medication Sig Taking?  Authorizing Provider   Naproxen Sodium (ALEVE PO) Take by mouth  Historical Provider, MD   fexofenadine (ALLEGRA) 180 MG tablet Take by mouth daily  Historical Provider, MD   fluticasone (FLONASE) 50 MCG/ACT nasal spray 2 sprays by Each Nostril route daily  Ghada Krishnan DO       Social History     Tobacco Use    Smoking status: Former     Packs/day: 0.75     Types: Cigarettes     Start date: 2008     Quit date: 2019     Years since quitting: 3.8    Smokeless tobacco: Current    Tobacco comments:     E-cig (vaping) as alternative for cigarettes   Vaping Use    Vaping Use: Every day    Substances: Nicotine, Flavoring    Devices: Pre-filled pod   Substance Use Topics    Alcohol use: Yes    Drug use: Yes     Types: Marijuana (Weed)            PHYSICAL EXAMINATION:  [ INSTRUCTIONS:  \"[x]\" Indicates a positive item  \"[]\" Indicates a negative item  -- DELETE ALL ITEMS NOT EXAMINED]  Vital Signs: (As obtained by patient/caregiver or practitioner observation)    Blood pressure-  Heart rate-    Respiratory rate-    Temperature-  Pulse oximetry-     Constitutional: [x] Appears well-developed and well-nourished [x] No apparent distress      [] Abnormal-   Mental status  [x] Alert and awake  [x] Oriented to person/place/time []Able to follow commands      Eyes:  EOM    []  Normal  [] Abnormal-  Sclera  []  Normal  [] Abnormal -         Discharge []  None visible  [] Abnormal -    HENT:   [x] Normocephalic, atraumatic. [] Abnormal   [x] Mouth/Throat: Mucous membranes are moist.     External Ears [] Normal  [] Abnormal-     Neck: [] No visualized mass     Pulmonary/Chest: [x] Respiratory effort normal.  [x] No visualized signs of difficulty breathing or respiratory distress        [] Abnormal-      Musculoskeletal:   [x] Normal gait with no signs of ataxia         [x] Normal range of motion of neck        [] Abnormal-       Neurological:        [] No Facial Asymmetry (Cranial nerve 7 motor function) (limited exam to video visit)          [] No gaze palsy        [] Abnormal-         Skin:        [x] No significant exanthematous lesions or discoloration noted on facial skin         [] Abnormal-            Psychiatric:       [] Normal Affect [] No Hallucinations        [] Abnormal-     Other pertinent observable physical exam findings-     ASSESSMENT/PLAN:  1. Sore throat  Rapid strep negative. Will send a culture. Treat with OTC medications- likely viral. Follow up if no improvement.   - POCT rapid strep A  - Culture, Throat        Mario Bowen is a 34 y.o. male being evaluated by a Virtual Visit (video visit) encounter to address concerns as mentioned above. A caregiver was present when appropriate. Due to this being a TeleHealth encounter (During UMEKI-45 public health emergency), evaluation of the following organ systems was limited: Vitals/Constitutional/EENT/Resp/CV/GI//MS/Neuro/Skin/Heme-Lymph-Imm.   Pursuant to the emergency declaration under the [facility] Act, 1135 waiver authority and the Coronavirus Preparedness and Response Supplemental Appropriations Act, this Virtual Visit was conducted with patient's (and/or legal guardian's) consent, to reduce the patient's risk of exposure to COVID-19 and provide necessary medical care. The patient (and/or legal guardian) has also been advised to contact this office for worsening conditions or problems, and seek emergency medical treatment and/or call 911 if deemed necessary. Services were provided through a video synchronous discussion virtually to substitute for in-person clinic visit. Patient and provider were located at their individual homes. --RASHIDA Rome - CNP on 1/26/2023 at 9:42 AM    This chart was generated using the [address] [place] [address] [address] dictation system. I created this record but it may contain dictation errors due to the limitation of the software. An electronic signature was used to authenticate this note.

## 2023-01-27 ENCOUNTER — TELEPHONE (OUTPATIENT)
Dept: FAMILY MEDICINE CLINIC | Age: 33
End: 2023-01-27

## 2023-01-27 ENCOUNTER — HOSPITAL ENCOUNTER (OUTPATIENT)
Dept: PHYSICAL THERAPY | Age: 33
Setting detail: THERAPIES SERIES
Discharge: HOME | End: 2023-01-27
Payer: COMMERCIAL

## 2023-01-27 DIAGNOSIS — M25.562 PAIN IN BOTH KNEES, UNSPECIFIED CHRONICITY: Primary | ICD-10-CM

## 2023-01-27 DIAGNOSIS — G89.29 CHRONIC PAIN OF MULTIPLE JOINTS: Primary | ICD-10-CM

## 2023-01-27 DIAGNOSIS — M25.50 CHRONIC PAIN OF MULTIPLE JOINTS: Primary | ICD-10-CM

## 2023-01-27 DIAGNOSIS — M25.561 PAIN IN BOTH KNEES, UNSPECIFIED CHRONICITY: Primary | ICD-10-CM

## 2023-01-27 PROCEDURE — 97110 THERAPEUTIC EXERCISES: CPT | Performed by: PHYSICAL THERAPIST

## 2023-01-27 PROCEDURE — 97161 PT EVAL LOW COMPLEX 20 MIN: CPT | Performed by: PHYSICAL THERAPIST

## 2023-01-27 NOTE — THERAPY EVALUATION
[facility], [facility], [address]  Phone: [phone]  Fax [phone]    Dear Genia Hoffman DO  ,    We had the pleasure of evaluating the following patient for physical therapy services at [address] [place] [address]. A summary of our findings can be found in the initial assessment below. This includes our plan of care. If you have any questions or concerns regarding these findings, please do not hesitate to contact me at the office phone number listed above. Thank you for the referral.     Physician Signature:_______________________________Date:__________________  By signing above (or electronic signature), therapists plan is approved by physician       Physical Therapy: Initial Evaluation   Patient: Jeanmarie Maya (4 y.o. male)  \"Tee\" Examination Date: 2023   :  1990 MRN: [id number]   Visit #: 1   Auth needed? []  Yes    []  No   Amount authorized:   Visit Limit:  Insurance: Payor: Shaheed Alexander / Plan: [facility] [place] P[place] / Product Type: *No Product type* /   Insurance ID: [id number] - ([address] [place] [address])  Secondary Insurance (if applicable):    Treatment Diagnosis:      ICD-10-CM    1. Pain in both knees, unspecified chronicity  M25.561     M25.562          Medical Diagnosis: Tendinitis of both wrists [M77.8]  De Quervain's tenosynovitis, bilateral [M65.4] M22.2X1, M22.2X2 (ICD-10-CM) - Patellofemoral syndrome of both knees  M25.50, G89.29 (ICD-10-CM) - Chronic pain of multiple joints   Referring Physician: Genia Hoffman DO    PCP: Charmaine Hernandez, DO   Plan of Care signed? []  Yes [x]  No  Latex Allergy? []  Yes [x]  No   Pace Maker?        []  Yes [x]  No    Preferred Language for Healthcare:   [x] English       [] other:     C-SSRS Triggered by Intake questionnaire (Past 2 wk assessment):   [x] No, Questionnaire did not trigger screening.   [] Yes, Patient intake triggered further evaluation      [] C-SSRS Screening completed  [] PCP notified via Plan of Care  [] Emergency services notified     SUBJECTIVE EXAMINATION     Patient stated complaint: Chronic knee pain made worse with kneeling and crouching as far back as 2016. Notices pain on stairs. Relevant Medical History: NA    Pain:  Pt. reports 1 /10 at present and 3 /10 at its worst  Pt. describes pain to be Not specified  Pain increases with: Activity and Movement and Kneeling  Pain decreases with: Resting and knee pads  Pt. reports pain with coughing, sneezing and laughing?:  NA  Pt. reports saddle paresthesia? NA    Red Flags:  None    Precautions/Contraindications:    None    Functional Outcome Measure/Scale:    Date Assessed 1/27/2023   Measure Used LEFS   Disability Score (%) 41%     Living Status/PLOF: Live with girlfriend, 2 flights into [facility]. 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 OBJECTIVE EXAMINATION     Palpation:   Unremarkable    Posture:    WNL    Bandages/Dressings/Incisions:  Not Applicable    Lower Quarter ROM, Strength and Myotomes:   (Blank cells denote NT)(* denotes pain)  (* denotes pain) MMT AROM (o) PROM (o) Comments   Lumbar Flexion       Lumbar Extension        L R L R L R    Trunk Sidebending          Lumbar Rotation       [] Seated  [] Standing   Hip Flexion (L1-2) 5 5        Hip Extension          Hip Adduction (L3)          Hip Abduction (L5) 4- 4-        Hip IR 4- 4-        Hip ER 4- 4-        Knee Extension (L3,4) 4+ 4+        Knee Flexion (L5,S1) 5 5        Ankle Dorsiflex (L4)          Ankle Plantarflex (S1,2)          Ankle Inversion          Ankle Eversion          Great Toe Ext (L5)                        Specific Joint Mobility Testing/Accessory Motions:   Hip: NT  Knee/patella: NT    Gait:  [x] WNL     [] Not Observed     [] Dysfunction noted  Comment: Balance:  [x] WNL      [] NT       [] Dysfunction noted  Comment:     Falls Risk Assessment (30 days): Falls Risk assessed and no intervention required. Time Up and Go (TUG): Not Assessed     Review Of Systems (ROS):  [x] Patient history, allergies, meds reviewed. [x] Performed Review of systems (Integumentary, CardioPulmonary, Neurological) by intake and observation. Intake form has been scanned into medical record. Patient has been instructed to contact their primary care physician regarding ROS issues if not already being addressed at this time. Co-morbidities/Complexities (which will affect the course of rehabilitation):  []  None         Arthritic conditions   [] Rheumatoid arthritis (M05.9)  [] Osteoarthritis (M19.91)   Cardiovascular conditions   [] Hypertension (I10)  [] Hyperlipidemia (E78.5)  [] Angina pectoris (I20)  [] Atherosclerosis (I70)   Musculoskeletal conditions   [] Disc pathology   [] Congenital spine pathologies   [] Prior surgical intervention  [] Osteoporosis (M81.8)  [] Osteopenia (M85.8)   Endocrine conditions   [] Hypothyroid (E03.9)  [] Hyperthyroid Gastrointestinal conditions   [] Constipation (S43.70)   Metabolic conditions   [] Morbid obesity (E66.01)  [] Diabetes type 1 (E10.65)  [] Diabetes type 2 (E11.65)  [] Neuropathy (G60.9)   Pulmonary conditions   [] Asthma (J45)  [] Coughing   [] COPD (J44.9) Psychological Disorders  [x] Anxiety (F41.9)  [x] Depression (F32.9)    [x] COVID-19   [] Other:      Barriers to/and or personal factors that will affect rehab potential:   None noted    ASSESSMENT   Assessment:  Pt. is a 34 y.o. y.o. male presenting today to Outpatient PT with Tendinitis of both wrists [M77.8]  De Quervain's tenosynovitis, bilateral [M65.4] M22.2X1, M22.2X2 (ICD-10-CM) - Patellofemoral syndrome of both knees  M25.50, G89.29 (ICD-10-CM) - Chronic pain of multiple joints.   Pt. presents with the functional impairments and activity limitations listed below and would benefit from continued Outpatient PT to address the below impairments as well as improve pain, and restore function. Functional Impairments:    Decreased core/proximal hip strength and neuromuscular control  Decreased LE functional strength      Functional Activity Limitations:  Reduced ability to tolerate prolonged functional positions  Reduced ability to maintain good posture and demonstrate good body mechanics with sitting, bending, and lifting  Reduced ability or ascend/descend stairs    Participation Restrictions:   Reduced participation in home management   Reduced participation in work activities    Classification :   Signs/symptoms consistent with patella-femoral syndrome    Prognosis/Rehab Potential:  [x] Excellent     [] Good     [] Fair     [] Poor         Tolerance of evaluation/treatment:   [x] Excellent     [] Good     [] Fair     [] Poor    Physical Therapy Evaluation Complexity Justification:  [x] A history of present problem and 1-2 personal factors and/or co-morbidities that impact the plan of care  [x] A total of 1-2 elements  found upon examination of body systems using standardized tests and measures addressing any of the following: body structures, functions (impairments), activity limitations, and/or participation restrictions  [x] A clinical presentation with stable and/or uncomplicated characteristics   [x] Clinical decision making of LOW (19887) complexity using standardized patient assessment instrument and/or measurable assessment of functional outcome. GOALS     Patient stated goal: Walk without pain. [x] Progressing: [] Met: [] Not Met: [] Adjusted    Therapist goals for Patient:   Short Term Goals: To be achieved in: 2 weeks  Independent in HEP and progression per patient tolerance, in order to progress toward full function and prevent re-injury.    [x] Progressing: [] Met: [] Not Met: [] Adjusted  Patient will have a decrease in pain to facilitate improvement in movement, function, and ADLs as indicated by functional deficits. [x] Progressing: [] Met: [] Not Met: [] Adjusted    Long Term Goals: To be achieved in: 6 weeks  Disability index score of 30% or less as measure by LEFS to assist with reaching prior level of function. [x] Progressing: [] Met: [] Not Met: [] Adjusted  Patient will demonstrate increased AROM to WNL, good Lumbar Spine mobility, good hip and knee ROM to allow for proper joint functioning as indicated by patients Functional Deficits. 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 [x] Progressing: [] Met: [] Not Met: [] Adjusted    TREATMENT PLAN     Frequency/Duration: 1-2x/week for 6 weeks for the treatment interventions listed below. Interventions:  [x]  Therapeutic exercise including: strength training, ROM, for LE, Glutes and core   [x]  NMR activation and proprioception for LE, Glutes and Core   [x]  Manual therapy as indicated for Hip and Knee to include:  PROM, Gr I-IV mobilizations, and STM   [x]  Modalities as needed that may include: Electrical Stimulation and Thermal Agents  [x]  Patient education on joint protection, postural re-education, activity modification, progression of HEP. HEP: Patient HEP program created electronically.   Refer to Teressa Edwards access code: [email or web address]    Electronically Signed by Maria Fernanda Glass PT  Date: 01/27/2023

## 2023-01-27 NOTE — TELEPHONE ENCOUNTER
[facility] outpatient rehab called in regarding patient. Asking if his referral to physical therapy could be changed to occupational therapy.      Liborio Grewal at [facility] left her number for any questions  [phone]

## 2023-01-27 NOTE — FLOWSHEET NOTE
[facility], [address]  Phone: [phone]  Fax [phone]     Physical Therapy: Daily Note   Patient: Hernando López (76 y.o. male)   Treatment Date: 2023   :  1990 MRN: [id number]   Visit #: 1   Auth needed? []  Yes    [x]  No   Amount authorized: NA  Visit Limit: 20 Insurance: Payor: Selena Saliva / Plan: [employer] [place] PPO / Product Type: *No Product type* /   Insurance ID: [id number] - ([facility])  Secondary Insurance (if applicable):    Treatment Diagnosis:      ICD-10-CM    1. Pain in both knees, unspecified chronicity  M25.561     M25.562          Medical Diagnosis: Tendinitis of both wrists [M77.8]  De Quervain's tenosynovitis, bilateral [M65.4] M22.2X1, M22.2X2 (ICD-10-CM) - Patellofemoral syndrome of both knees  M25.50, G89.29 (ICD-10-CM) - Chronic pain of multiple joints   Referring Physician: George Thompson DO    PCP: Jhoan Morales, DO   Plan of Care signed? []  Yes [x]  No  Latex Allergy? [] Y   [x] N      Pacemaker? [] Y   [x] N   Preferred Language for Healthcare:   [x] English       [] other:       RESTRICTIONS/PRECAUTIONS: None    Functional Outcome Measure/Scale:    Date Assessed (Eval)     LEFS (%) 41         SUBJECTIVE EXAMINATION   Pain level:  1 /10    Patient Report/Comments: see eval      OBJECTIVE EXAMINATION   Observation:     Test measurements:     TREATMENT     Exercise / Equipment / Intervention Sets / Vaibhav Jayne / Resistance Comments / Cueing HEP   ALL [address] unless stated otherwise      Standing HS stretch 3x30\"     SLR x10     Clamshells X10  RTB                                                                              Manual Treatment: NA      Modalities:    [x] None  [] Electrical Stimulation: for pain modulation and symptom control  [] Other:     Access Code: [email or web address]  URL: [email or web address]  Date: 2023  Prepared by:  Abena Howard Shaka    Exercises  Standing Hamstring Stretch with Step - 1-3 x daily - 7 x weekly - 3 reps - 30 hold  Supine Active Straight Leg Raise - 1-2 x daily - 7 x weekly - 1-3 sets - 10 reps  Clamshell with Resistance - 1-2 x daily - 7 x weekly - 1-3 sets - 10 reps    ASSESSMENT     Treatment/Activity Tolerance:  [x] Patient tolerated treatment well [] Patient limited by fatigue  [] Patient limited by pain  [] Patient limited by other medical complications  [] Other:     Overall Progression Towards Functional goals/ Treatment Progress Update:  [] Patient is progressing as expected towards functional goals listed. [] Progression is slowed due to complexities/Impairments listed. [] Progression has been slowed due to co-morbidities. [x] Plan just implemented, too soon (<30days) to assess goals progression   [] Goals require adjustment due to lack of progress  [] Patient is not progressing as expected and requires additional follow up with physician  [] Other:     Prognosis for POC:   [x] Good     [] Fair     [] Poor      Patient requires continued skilled intervention: [x] Yes       [] No    GOALS     Patient stated goal: Walk without pain. [x] Progressing: [] Met: [] Not Met: [] Adjusted     Therapist goals for Patient:   Short Term Goals: To be achieved in: 2 weeks  Independent in HEP and progression per patient tolerance, in order to progress toward full function and prevent re-injury. [x] Progressing: [] Met: [] Not Met: [] Adjusted  Patient will have a decrease in pain to facilitate improvement in movement, function, and ADLs as indicated by functional deficits. [x] Progressing: [] Met: [] Not Met: [] Adjusted     Long Term Goals: To be achieved in: 6 weeks  Disability index score of 30% or less as measure by LEFS to assist with reaching prior level of function.   [x] Progressing: [] Met: [] Not Met: [] Adjusted  Patient will demonstrate increased AROM to WNL, good Lumbar Spine mobility, good hip and knee ROM to allow for proper joint functioning as indicated by patients Functional Deficits. 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 [x] Progressing: [] Met: [] Not Met: [] Adjusted    TREATMENT PLAN     [] Continue per plan of care [] Alter current plan  [x] Plan of care initiated [] Hold pending MD visit  [] Discharge    BILLING     Timed Code Treatment Minutes: 20   Total Treatment Minutes: 45     If Medicare: NA  If [facility]: NA    CPT Code # CPT Code #     [x] Eval Low (83131)  1  [] Gait (79526)     [] Eval Medium (79641)   [] VASO (92194)     [] Eval High (55333)   [] Estim Unattended (84410)     [] Re-Eval (60356)   [] [facility]. Traction (83626)     [x] Therex (26142)  1  [] Dry Needle 1-2 muscle (63916)     [] Neuromusc. Re-ed (28909)   [] Dry Needle 3+ muscle (64050)     [] Manual (17001)   [] Group Therapy     [] Ther.  Act (37293)   []       Electronically Signed by Will Noriega PT  Date: 01/27/2023

## 2023-01-28 LAB — THROAT CULTURE: NORMAL

## 2023-01-31 ENCOUNTER — OFFICE VISIT (OUTPATIENT)
Dept: PSYCHOLOGY | Age: 33
End: 2023-01-31
Payer: COMMERCIAL

## 2023-01-31 DIAGNOSIS — F90.0 ADHD (ATTENTION DEFICIT HYPERACTIVITY DISORDER), INATTENTIVE TYPE: Primary | ICD-10-CM

## 2023-01-31 DIAGNOSIS — F41.8 ANXIETY WITH DEPRESSION: ICD-10-CM

## 2023-01-31 PROCEDURE — 90832 PSYTX W PT 30 MINUTES: CPT | Performed by: PSYCHOLOGIST

## 2023-01-31 ASSESSMENT — PATIENT HEALTH QUESTIONNAIRE - PHQ9
9. THOUGHTS THAT YOU WOULD BE BETTER OFF DEAD, OR OF HURTING YOURSELF: 0
5. POOR APPETITE OR OVEREATING: 1
SUM OF ALL RESPONSES TO PHQ QUESTIONS 1-9: 7
4. FEELING TIRED OR HAVING LITTLE ENERGY: 1
6. FEELING BAD ABOUT YOURSELF - OR THAT YOU ARE A FAILURE OR HAVE LET YOURSELF OR YOUR FAMILY DOWN: 1
SUM OF ALL RESPONSES TO PHQ QUESTIONS 1-9: 7
SUM OF ALL RESPONSES TO PHQ9 QUESTIONS 1 & 2: 2
2. FEELING DOWN, DEPRESSED OR HOPELESS: 1
SUM OF ALL RESPONSES TO PHQ QUESTIONS 1-9: 7
1. LITTLE INTEREST OR PLEASURE IN DOING THINGS: 1
7. TROUBLE CONCENTRATING ON THINGS, SUCH AS READING THE NEWSPAPER OR WATCHING TELEVISION: 2
8. MOVING OR SPEAKING SO SLOWLY THAT OTHER PEOPLE COULD HAVE NOTICED. OR THE OPPOSITE, BEING SO FIGETY OR RESTLESS THAT YOU HAVE BEEN MOVING AROUND A LOT MORE THAN USUAL: 0
SUM OF ALL RESPONSES TO PHQ QUESTIONS 1-9: 7
3. TROUBLE FALLING OR STAYING ASLEEP: 0

## 2023-01-31 ASSESSMENT — ANXIETY QUESTIONNAIRES
6. BECOMING EASILY ANNOYED OR IRRITABLE: 0
2. NOT BEING ABLE TO STOP OR CONTROL WORRYING: 1
GAD7 TOTAL SCORE: 4
4. TROUBLE RELAXING: 1
7. FEELING AFRAID AS IF SOMETHING AWFUL MIGHT HAPPEN: 0
5. BEING SO RESTLESS THAT IT IS HARD TO SIT STILL: 0
1. FEELING NERVOUS, ANXIOUS, OR ON EDGE: 1
3. WORRYING TOO MUCH ABOUT DIFFERENT THINGS: 1

## 2023-01-31 NOTE — PATIENT INSTRUCTIONS
Worry Management    The following strategies may be used to deal with your worries when you feel that they are becoming overwhelming. 1. Worry Place and Time. Set a 30-minute worry period that will take place at the same time and same place each day. Your worry place and time will be: ______________________________________________________________________  2. Delay Worry. If you notice you are worrying outside of your scheduled worry time, tell yourself, I have plenty of time to focus on this later. Right now Im just going to be in the moment and notice what Im doing, what others are doing, the environment, and other things I see, hear, or smell.   3. Worry Log. Record all your worries during your worry time on the Worry Log on the following page and then take time to categorize these worries. You can choose categories that are helpful for you. You might organize them by Big Concerns, Medium Concerns, and Small Concerns.  Another option would be to categorize them by content area, such as Work Concerns, Family Concerns, Financial Concerns, and Relationship Concerns.  Any means of categorizing can be used; however, it is important not to use too many categories; usually between three and seven work best.    In the next column of your worry log, you can write how you will manage the problem. If the problem is something you have absolutely no control over, you might write down, Im not going to worry about this problem because there is nothing I can do about it right now.     Worry Log  Worrisome thought Category Management strategy

## 2023-01-31 NOTE — PROGRESS NOTES
Behavioral Health Consultation  Barbara Uriarte M.A. Psychology Assistant  Keyona Knox, Ph.D. Supervising Psychologist  1/31/2023  9:15 AM EST      Time spent with Patient: 25 minutes  This is patient's third  [facility] appointment. Reason for Consult:    Chief Complaint   Patient presents with    ADHD    Anxiety    Depression       Feedback given to PCP. S:  Doing OK; has follow-up with PCP scheduled for next week to discuss ADHD medication options. Processed difficulties stemming from family conflict. Has difficulty \"letting go\" of things he cannot control. Reports frequently worrying, imagining conversations in his head with people he is frustrated with, which is causing him difficulties in focusing at work. Discussed scheduled worry time and collaboratively problem-solved to increase likelihood of adherence. Pt inquired re: behavioral strategies for managing ADHD sx, including keeping a daily planner in his phone. [facility] encouraged implementing identified behavioral strategies to improve attention/concentration; discussed adding a central planner in common space that Pt and his partner could complete together. Identified that completing calendar with partner would allow them to prioritize spending time with one another, which is a value has identified as important to him.      O:  MSE:    Appearance: good hygiene   Attitude: cooperative and friendly  Consciousness: alert  Orientation: oriented to person, place, time, general circumstance  Memory: recent and remote memory intact  Attention/Concentration: intact during session  Psychomotor Activity:normal  Eye Contact: normal  Speech: normal rate and volume, well-articulated  Mood: euthymic  Affect: euthymic  Perception: within normal limits  Thought Content: within normal limits  Thought Process: logical, coherent and goal-directed  Insight: good  Judgment: intact  Ability to understand instructions: Yes  Ability to respond meaningfully: Yes  Morbid Ideation: no   Suicide Assessment: no suicidal ideation, plan, or intent  Homicidal Ideation: no    History:    Medications:   Current Outpatient Medications   Medication Sig Dispense Refill    Naproxen Sodium (ALEVE PO) Take by mouth      fexofenadine (ALLEGRA) 180 MG tablet Take by mouth daily      fluticasone (FLONASE) 50 MCG/ACT nasal spray 2 sprays by Each Nostril route daily 16 g 0     No current facility-administered medications for this visit.     Social History:   Social History     Socioeconomic History    Marital status: Single     Spouse name: Not on file    Number of children: Not on file    Years of education: Not on file    Highest education level: Not on file   Occupational History    Not on file   Tobacco Use    Smoking status: Former     Packs/day: 0.75     Types: Cigarettes     Start date: 8/4/2008     Quit date: 4/1/2019     Years since quitting: 3.8    Smokeless tobacco: Current    Tobacco comments:     E-cig (vaping) as alternative for cigarettes   Vaping Use    Vaping Use: Every day    Substances: Nicotine, Flavoring    Devices: Pre-filled pod   Substance and Sexual Activity    Alcohol use: Yes    Drug use: Yes     Types: Marijuana (Weed)    Sexual activity: Yes     Partners: Female     Birth control/protection: Condom   Other Topics Concern    Not on file   Social History Narrative    Not on file     Social Determinants of Health     Financial Resource Strain: Not on file   Food Insecurity: Not on file   Transportation Needs: Not on file   Physical Activity: Sufficiently Active    Days of Exercise per Week: 5 days    Minutes of Exercise per Session: 60 min   Stress: Not on file   Social Connections: Not on file   Intimate Partner Violence: Not At Risk    Fear of Current or Ex-Partner: No    Emotionally Abused: No    Physically Abused: No    Sexually Abused: No   Housing Stability: Not on file     TOBACCO:   reports that he quit smoking about 3 years ago. His smoking use included cigarettes. He started  smoking about 14 years ago. He smoked an average of .75 packs per day. He uses smokeless tobacco.  ETOH:   reports current alcohol use. Family History:   Family History   Problem Relation Age of Onset    Thyroid Disease Mother         thyroid storm    Elevated Lipids Father     Cancer Father         skin cancer    No Known Problems Sister     No Known Problems Sister     No Known Problems Sister        A:  Administered PHQ-9 and AIMEE-7 (see below). Patient endorses mild symptoms of depression and minimal symptoms of anxiety. Denies SI.     PHQ Scores 1/31/2023 1/17/2023 1/9/2023 12/20/2022 11/30/2022   PHQ2 Score 2 1 1 2 2   PHQ9 Score 7 5 7 8 2     Interpretation of Total Score Depression Severity: 1-4 = Minimal depression, 5-9 = Mild depression, 10-14 = Moderate depression, 15-19 = Moderately severe depression, 20-27 = Severe depression     AIMEE 7 SCORE 1/31/2023 1/17/2023 1/9/2023 12/20/2022   AIMEE-7 Total Score 4 5 4 6     Interpretation of AIMEE-7 score: 5-9 = mild anxiety, 10-14 = moderate anxiety, 15+ = severe anxiety. Recommend referral to behavioral health for scores 10 or greater. Diagnosis:    1. ADHD (attention deficit hyperactivity disorder), inattentive type    2.  Anxiety with depression          Diagnosis Date    Anxiety with depression 1/9/2023    Attention deficit hyperactivity disorder (ADHD), predominantly inattentive type 1/9/2023       Plan:  Pt interventions:  Trained in relaxation strategies, Provided education, Discussed self-care (sleep, nutrition, rewarding activities, social support, exercise), Motivational Interviewing to determine importance and readiness for change, Discussed potential barriers to change, and Conducted functional assessment    Pt Behavioral Change Plan:   See Pt Instructions

## 2023-02-01 ENCOUNTER — HOSPITAL ENCOUNTER (OUTPATIENT)
Dept: PHYSICAL THERAPY | Age: 33
Setting detail: THERAPIES SERIES
Discharge: HOME | End: 2023-02-01
Payer: COMMERCIAL

## 2023-02-01 PROCEDURE — 97110 THERAPEUTIC EXERCISES: CPT | Performed by: PHYSICAL THERAPIST

## 2023-02-01 PROCEDURE — 97112 NEUROMUSCULAR REEDUCATION: CPT | Performed by: PHYSICAL THERAPIST

## 2023-02-01 NOTE — FLOWSHEET NOTE
[facility]. [facility], [facility], [address] [employer] [place] [facility], [address]  Phone: [phone]  Fax [phone]     Physical Therapy: Daily Note   Patient: Alison Rose (45 y.o. male)   Treatment Date: 2023   :  1990 MRN: [id number]   Visit #: 2   Auth needed? []  Yes    [x]  No   Amount authorized: NA  Visit Limit: 20 Insurance: Payor: Ivan Fair / Plan: BCBS - [place] PPO / Product Type: *No Product type* /   Insurance ID: [id number] - ([facility])  Secondary Insurance (if applicable):    Treatment Diagnosis:      ICD-10-CM    1. Pain in both knees, unspecified chronicity  M25.561     M25.562          Medical Diagnosis: Tendinitis of both wrists [M77.8]  De Quervain's tenosynovitis, bilateral [M65.4] M22.2X1, M22.2X2 (ICD-10-CM) - Patellofemoral syndrome of both knees  M25.50, G89.29 (ICD-10-CM) - Chronic pain of multiple joints   Referring Physician: Meghan Greenfield DO    PCP: Nikki Rehman, DO   Plan of Care signed? []  Yes [x]  No  Latex Allergy? [] Y   [x] N      Pacemaker? [] Y   [x] N   Preferred Language for Healthcare:   [x] English       [] other:       RESTRICTIONS/PRECAUTIONS: None    Functional Outcome Measure/Scale:    Date Assessed (Eval)     LEFS (%) 41         SUBJECTIVE EXAMINATION   Pain level:  1 /10    Patient Report/Comments: Pt. Reports semi compliance with HEP since first visit.       OBJECTIVE EXAMINATION   Observation:     Test measurements:     TREATMENT     Exercise / Equipment / Intervention Sets / Reps / Resistance Comments / Cueing HEP   ALL THEREX BILATERAL unless stated otherwise      Standing HS stretch 2x30\"     SLR x10     SLR w/ ER (VMO focus) x10     Clamshells X15 RTB    SAQ X20 2#           [facility] ABD  [facility] TKE        Leg press X10 80#                       Bike x5'                               Manual Treatment: NA      Modalities:    [x] None  [] Electrical Stimulation: for pain modulation and symptom control  [] Other: Access Code: [email or web address]  URL: [email or web address]  Date: 01/27/2023  Prepared by: Kane Christensen    Exercises  Standing Hamstring Stretch with Step - 1-3 x daily - 7 x weekly - 3 reps - 30 hold  Supine Active Straight Leg Raise - 1-2 x daily - 7 x weekly - 1-3 sets - 10 reps  Clamshell with Resistance - 1-2 x daily - 7 x weekly - 1-3 sets - 10 reps    ASSESSMENT     Treatment/Activity Tolerance:  [x] Patient tolerated treatment well [] Patient limited by fatigue  [] Patient limited by pain  [] Patient limited by other medical complications  [] Other:     Overall Progression Towards Functional goals/ Treatment Progress Update:  [] Patient is progressing as expected towards functional goals listed. [] Progression is slowed due to complexities/Impairments listed. [] Progression has been slowed due to co-morbidities. [x] Plan just implemented, too soon (<30days) to assess goals progression   [] Goals require adjustment due to lack of progress  [] Patient is not progressing as expected and requires additional follow up with physician  [] Other:     Prognosis for POC:   [x] Good     [] Fair     [] Poor      Patient requires continued skilled intervention: [x] Yes       [] No    GOALS     Patient stated goal: Walk without pain. [x] Progressing: [] Met: [] Not Met: [] Adjusted     Therapist goals for Patient:   Short Term Goals: To be achieved in: 2 weeks  Independent in HEP and progression per patient tolerance, in order to progress toward full function and prevent re-injury. [x] Progressing: [] Met: [] Not Met: [] Adjusted  Patient will have a decrease in pain to facilitate improvement in movement, function, and ADLs as indicated by functional deficits. [x] Progressing: [] Met: [] Not Met: [] Adjusted     Long Term Goals: To be achieved in: 6 weeks  Disability index score of 30% or less as measure by LEFS to assist with reaching prior level of function.   [x] Progressing: [] Met: [] Not Met: [] Adjusted  Patient will demonstrate increased AROM to WNL, good Lumbar Spine mobility, good hip and knee ROM to allow for proper joint functioning as indicated by patients Functional Deficits. 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 [x] Progressing: [] Met: [] Not Met: [] Adjusted    TREATMENT PLAN     [] Continue per plan of care [] Alter current plan  [x] Plan of care initiated [] Hold pending MD visit  [] Discharge    BILLING     Timed Code Treatment Minutes: 30   Total Treatment Minutes: 30     If Medicare: NA  If [facility]: NA    CPT Code # CPT Code #     [] Eval Low (66569)    [] Gait (09584)     [] Eval Medium (15020)   [] VASO (27023)     [] Eval High (42957)   [] Estim Unattended (22404)     [] Re-Eval (69431)   [] [facility] Traction (58904)     [x] Therex (60109)  1  [] Dry Needle 1-2 muscle (95685)     [x] Neuromusc. Re-ed (75081) 1  [] Dry Needle 3+ muscle (91897)     [] Manual (80557)   [] Group Therapy     [] Ther.  Act (22403)   []       Electronically Signed by Lokesh Borja PT  Date: 02/01/2023

## 2023-02-07 ENCOUNTER — HOSPITAL ENCOUNTER (OUTPATIENT)
Dept: PHYSICAL THERAPY | Age: 33
Setting detail: THERAPIES SERIES
Discharge: HOME | End: 2023-02-07
Payer: COMMERCIAL

## 2023-02-07 ENCOUNTER — OFFICE VISIT (OUTPATIENT)
Dept: FAMILY MEDICINE CLINIC | Age: 33
End: 2023-02-07
Payer: COMMERCIAL

## 2023-02-07 VITALS
WEIGHT: 203 LBS | OXYGEN SATURATION: 98 % | DIASTOLIC BLOOD PRESSURE: 70 MMHG | HEART RATE: 86 BPM | SYSTOLIC BLOOD PRESSURE: 120 MMHG | HEIGHT: 69 IN | BODY MASS INDEX: 30.07 KG/M2

## 2023-02-07 DIAGNOSIS — F41.8 ANXIETY WITH DEPRESSION: ICD-10-CM

## 2023-02-07 DIAGNOSIS — F90.0 ATTENTION DEFICIT HYPERACTIVITY DISORDER (ADHD), PREDOMINANTLY INATTENTIVE TYPE: Primary | ICD-10-CM

## 2023-02-07 LAB
AMPHETAMINE SCREEN, URINE: NORMAL
BARBITURATE SCREEN URINE: NORMAL
BENZODIAZEPINE SCREEN, URINE: NORMAL
CANNABINOID SCREEN URINE: NORMAL
COCAINE METABOLITE SCREEN URINE: NORMAL
FENTANYL SCREEN, URINE: NORMAL
Lab: NORMAL
METHADONE SCREEN, URINE: NORMAL
OPIATE SCREEN URINE: NORMAL
OXYCODONE URINE: NORMAL
PH UA: 6
PHENCYCLIDINE SCREEN URINE: NORMAL

## 2023-02-07 PROCEDURE — 97112 NEUROMUSCULAR REEDUCATION: CPT | Performed by: PHYSICAL THERAPIST

## 2023-02-07 PROCEDURE — 97110 THERAPEUTIC EXERCISES: CPT | Performed by: PHYSICAL THERAPIST

## 2023-02-07 PROCEDURE — 99214 OFFICE O/P EST MOD 30 MIN: CPT | Performed by: STUDENT IN AN ORGANIZED HEALTH CARE EDUCATION/TRAINING PROGRAM

## 2023-02-07 RX ORDER — ATOMOXETINE 40 MG/1
CAPSULE ORAL
Qty: 60 CAPSULE | Refills: 0 | Status: SHIPPED | OUTPATIENT
Start: 2023-02-07 | End: 2023-03-09

## 2023-02-07 SDOH — ECONOMIC STABILITY: INCOME INSECURITY: HOW HARD IS IT FOR YOU TO PAY FOR THE VERY BASICS LIKE FOOD, HOUSING, MEDICAL CARE, AND HEATING?: SOMEWHAT HARD

## 2023-02-07 SDOH — ECONOMIC STABILITY: FOOD INSECURITY: WITHIN THE PAST 12 MONTHS, THE FOOD YOU BOUGHT JUST DIDN'T LAST AND YOU DIDN'T HAVE MONEY TO GET MORE.: NEVER TRUE

## 2023-02-07 SDOH — ECONOMIC STABILITY: FOOD INSECURITY: WITHIN THE PAST 12 MONTHS, YOU WORRIED THAT YOUR FOOD WOULD RUN OUT BEFORE YOU GOT MONEY TO BUY MORE.: NEVER TRUE

## 2023-02-07 SDOH — ECONOMIC STABILITY: TRANSPORTATION INSECURITY
IN THE PAST 12 MONTHS, HAS LACK OF TRANSPORTATION KEPT YOU FROM MEETINGS, WORK, OR FROM GETTING THINGS NEEDED FOR DAILY LIVING?: NO

## 2023-02-07 SDOH — ECONOMIC STABILITY: HOUSING INSECURITY
IN THE LAST 12 MONTHS, WAS THERE A TIME WHEN YOU DID NOT HAVE A STEADY PLACE TO SLEEP OR SLEPT IN A SHELTER (INCLUDING NOW)?: NO

## 2023-02-07 ASSESSMENT — PATIENT HEALTH QUESTIONNAIRE - PHQ9
SUM OF ALL RESPONSES TO PHQ9 QUESTIONS 1 & 2: 2
SUM OF ALL RESPONSES TO PHQ QUESTIONS 1-9: 5
5. POOR APPETITE OR OVEREATING: 1
SUM OF ALL RESPONSES TO PHQ QUESTIONS 1-9: 5
6. FEELING BAD ABOUT YOURSELF - OR THAT YOU ARE A FAILURE OR HAVE LET YOURSELF OR YOUR FAMILY DOWN: 0
4. FEELING TIRED OR HAVING LITTLE ENERGY: 0
9. THOUGHTS THAT YOU WOULD BE BETTER OFF DEAD, OR OF HURTING YOURSELF: 0
3. TROUBLE FALLING OR STAYING ASLEEP: 0
1. LITTLE INTEREST OR PLEASURE IN DOING THINGS: 1
2. FEELING DOWN, DEPRESSED OR HOPELESS: 1
7. TROUBLE CONCENTRATING ON THINGS, SUCH AS READING THE NEWSPAPER OR WATCHING TELEVISION: 2
10. IF YOU CHECKED OFF ANY PROBLEMS, HOW DIFFICULT HAVE THESE PROBLEMS MADE IT FOR YOU TO DO YOUR WORK, TAKE CARE OF THINGS AT HOME, OR GET ALONG WITH OTHER PEOPLE: 1
8. MOVING OR SPEAKING SO SLOWLY THAT OTHER PEOPLE COULD HAVE NOTICED. OR THE OPPOSITE, BEING SO FIGETY OR RESTLESS THAT YOU HAVE BEEN MOVING AROUND A LOT MORE THAN USUAL: 0
SUM OF ALL RESPONSES TO PHQ QUESTIONS 1-9: 5
SUM OF ALL RESPONSES TO PHQ QUESTIONS 1-9: 5

## 2023-02-07 ASSESSMENT — ANXIETY QUESTIONNAIRES
7. FEELING AFRAID AS IF SOMETHING AWFUL MIGHT HAPPEN: 0
1. FEELING NERVOUS, ANXIOUS, OR ON EDGE: 1
5. BEING SO RESTLESS THAT IT IS HARD TO SIT STILL: 1
2. NOT BEING ABLE TO STOP OR CONTROL WORRYING: 1
4. TROUBLE RELAXING: 1
IF YOU CHECKED OFF ANY PROBLEMS ON THIS QUESTIONNAIRE, HOW DIFFICULT HAVE THESE PROBLEMS MADE IT FOR YOU TO DO YOUR WORK, TAKE CARE OF THINGS AT HOME, OR GET ALONG WITH OTHER PEOPLE: SOMEWHAT DIFFICULT
6. BECOMING EASILY ANNOYED OR IRRITABLE: 1
3. WORRYING TOO MUCH ABOUT DIFFERENT THINGS: 1
GAD7 TOTAL SCORE: 6

## 2023-02-07 ASSESSMENT — ENCOUNTER SYMPTOMS
VOMITING: 0
NAUSEA: 0
BLOOD IN STOOL: 0
SHORTNESS OF BREATH: 0

## 2023-02-07 NOTE — FLOWSHEET NOTE
[facility]  [facility] [facility] Therapy, [facility], [address] [place] [facility], [address]  Phone: [phone]  Fax [phone]     Physical Therapy: Daily Note   Patient: Herrera Avila (47 y.o. male)   Treatment Date: 2023   :  1990 MRN: [id number]   Visit #: 3   Auth needed? []  Yes    [x]  No   Amount authorized: NA  Visit Limit: 20 Insurance: Payor: Brittany Pod / Plan: BCBS - [place] PPO / Product Type: *No Product type* /   Insurance ID: [id number] - ([facility])  Secondary Insurance (if applicable):    Treatment Diagnosis:      ICD-10-CM    1. Pain in both knees, unspecified chronicity  M25.561     M25.562          Medical Diagnosis: Tendinitis of both wrists [M77.8]  De Quervain's tenosynovitis, bilateral [M65.4] M22.2X1, M22.2X2 (ICD-10-CM) - Patellofemoral syndrome of both knees  M25.50, G89.29 (ICD-10-CM) - Chronic pain of multiple joints   Referring Physician: Domi Hickman DO    PCP: Baljinder Steiner, DO   Plan of Care signed? []  Yes [x]  No  Latex Allergy? [] Y   [x] N      Pacemaker? [] Y   [x] N   Preferred Language for Healthcare:   [x] English       [] other:       RESTRICTIONS/PRECAUTIONS: None    Functional Outcome Measure/Scale:    Date Assessed (Eval)     LEFS (%) 41         SUBJECTIVE EXAMINATION   Pain level:  1 /10    Patient Report/Comments: Didn't have any problems after last visit. Reports non-compliance with HEP since last visit.       OBJECTIVE EXAMINATION   Observation:     Test measurements:     TREATMENT     Exercise / Equipment / Intervention Sets / Reps / Resistance Comments / Cueing HEP   ALL THEREX BILATERAL unless stated otherwise      Standing HS stretch     Tableside HS stretch 3x30\"     SLR  2x10     SLR w/ ER (VMO focus) 2x10     Clamshells w/band 2x10 RTB    SAQ 2x10 3\" 3#           BRITTNY ABD  BRITTNY TKE x10 30#  15 3\" 45#     Leg press 2X10 100#     Lateral band walk 3 laps RTB     LSD x10     TG x5'           Bike x5' Manual Treatment: NA      Modalities:    [x] None  [] Electrical Stimulation: for pain modulation and symptom control  [] Other:     Access Code: [email or web address]  URL: [email or web address]  Date: 01/27/2023  Prepared by: Kane Urbano    Exercises  Standing Hamstring Stretch with Step - 1-3 x daily - 7 x weekly - 3 reps - 30 hold  Supine Active Straight Leg Raise - 1-2 x daily - 7 x weekly - 1-3 sets - 10 reps  Clamshell with Resistance - 1-2 x daily - 7 x weekly - 1-3 sets - 10 reps    ASSESSMENT     Treatment/Activity Tolerance:  [x] Patient tolerated treatment well [] Patient limited by fatigue  [] Patient limited by pain  [] Patient limited by other medical complications  [] Other:     Overall Progression Towards Functional goals/ Treatment Progress Update:  [x] Patient is progressing as expected towards functional goals listed. [] Progression is slowed due to complexities/Impairments listed. [] Progression has been slowed due to co-morbidities. [] Plan just implemented, too soon (<30days) to assess goals progression   [] Goals require adjustment due to lack of progress  [] Patient is not progressing as expected and requires additional follow up with physician  [] Other:     Prognosis for POC:   [x] Good     [] Fair     [] Poor      Patient requires continued skilled intervention: [x] Yes       [] No    GOALS     Patient stated goal: Walk without pain. [x] Progressing: [] Met: [] Not Met: [] Adjusted     Therapist goals for Patient:   Short Term Goals: To be achieved in: 2 weeks  Independent in HEP and progression per patient tolerance, in order to progress toward full function and prevent re-injury. [x] Progressing: [] Met: [] Not Met: [] Adjusted  Patient will have a decrease in pain to facilitate improvement in movement, function, and ADLs as indicated by functional deficits. [x] Progressing: [] Met: [] Not Met: [] Adjusted     Long Term Goals:  To be achieved in: 6 weeks  Disability index score of 30% or less as measure by LEFS to assist with reaching prior level of function. [x] Progressing: [] Met: [] Not Met: [] Adjusted  Patient will demonstrate increased AROM to WNL, good Lumbar Spine mobility, good hip and knee ROM to allow for proper joint functioning as indicated by patients Functional Deficits. 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 [x] Progressing: [] Met: [] Not Met: [] Adjusted    TREATMENT PLAN     [x] Continue per plan of care [] Alter current plan  [] Plan of care initiated [] Hold pending MD visit  [] Discharge    BILLING     Timed Code Treatment Minutes: 45   Total Treatment Minutes: 45     If Medicare: NA  If [facility]: NA    CPT Code # CPT Code #     [] Eval Low (87251)    [] Gait (17142)     [] Eval Medium (38123)   [] VASO (99198)     [] Eval High (67184)   [] Estim Unattended (96556)     [] Re-Eval (98176)   [] [facility]. Traction (90596)     [x] Therex (55412)  2  [] Dry Needle 1-2 muscle (33665)     [x] Neuromusc. Re-ed (45970) 1  [] Dry Needle 3+ muscle (69780)     [] Manual (91896)   [] Group Therapy     [] Ther.  Act (34334)   []       Electronically Signed by Ethan Springer PT  Date: 02/07/2023

## 2023-02-07 NOTE — PROGRESS NOTES
[facility]  2023    Conrad Berman (:  1990) is a 34 y.o. male, here for evaluation of the following medical concerns:    Chief Complaint   Patient presents with    Follow-up     Discuss ADHD dx, needs medication        ASSESSMENT/ PLAN  1. Attention deficit hyperactivity disorder (ADHD), predominantly inattentive type  - ADHD diagnosis with [facility] [place] initial consult completed. Follows up with behavioral health for ADHD and co-occurring anxiety and/or depression  - Discussed management and treatment including psychosocial interventions and pharmacotherapy including risks, benefits and side effects of medications including stimulants vs non-stimulants. Pt opted for initiating Strattera. - No known substance use disorder. Educated on management of controlled substance including treatment monitoring-follow-up every 3 months, UDS, medication agreement. - atomoxetine (STRATTERA) 40 MG capsule; Take 1 capsule by mouth daily for 3 days, THEN 2 capsules daily for 27 days. Dispense: 60 capsule; Refill: 0  - DRUG SCREEN MULTI URINE; Future    2. Anxiety with depression  As above, mild anxiety and depression  AIMEE 7 SCORE 2022   AIMEE-7 Total Score 6 4 5 4 6     Interpretation of AIMEE-7 score: 5-9 = mild anxiety, 10-14 = moderate anxiety, 15+ = severe anxiety. Recommend referral to behavioral health for scores 10 or greater. PHQ Scores 2022   PHQ2 Score 2 2 1 1 2 2   PHQ9 Score 5 7 5 7 8 2     Interpretation of Total Score Depression Severity: 1-4 = Minimal depression, 5-9 = Mild depression, 10-14 = Moderate depression, 15-19 = Moderately severe depression, 20-27 = Severe depression  - Doing well with behavioral health       Return in about 4 weeks (around 3/7/2023) for ADHD. Education provided regarding visit today. See visit diagnoses, orders and follow up  recommendations.  Portions of record reviewed for pertinent issues: active problem list,medication list,allergies,social history, health maintenance. Discussed probable diagnosis, test results if available in office today and management options with patient: patient agreed to a medical plan. Will contact patient for results. HPI    Adult ADHD Inital  His concerns today are chronic. Diagnosis of ADD/ADHD was made at 34years old. He reports these issues have had the following impacting on their life: academic underachievement, behavioral problems, low self-esteem, and school difficulties. Family history of ADHD: No.    Referred to [facility] for assistance with diagnosis. Pt evaluated 12/20/22 with ADHD, inattentive type with concurrent anxiety and depression. Previous ADHD testing has been completed: yes. Has never been on medications. Previously referred to [facility] psych NP unfortunately was not seen prior her departure. Pt has never tried any medications in the past.   Personal neuropsychiatric history is positive for anxiety disorder and major depression. Family history of neuropsychiatric history is negative.  has been reviewed. Work day typically lasts 8 hours. Works in [employer]. ROS  Review of Systems   Constitutional:  Negative for chills and fever. HENT:  Negative for congestion. Eyes:  Negative for visual disturbance. Respiratory:  Negative for shortness of breath. Cardiovascular:  Negative for chest pain. Gastrointestinal:  Negative for blood in stool, nausea and vomiting. Genitourinary:  Negative for frequency and hematuria. Neurological:  Negative for weakness. Psychiatric/Behavioral:  Negative for dysphoric mood, self-injury, sleep disturbance and suicidal ideas.       HISTORIES  Current Outpatient Medications on File Prior to Visit   Medication Sig Dispense Refill    Naproxen Sodium (ALEVE PO) Take by mouth      fexofenadine (ALLEGRA) 180 MG tablet Take by mouth daily      fluticasone (FLONASE) 50 MCG/ACT nasal spray 2 sprays by Each Nostril route daily 16 g 0     No current facility-administered medications on file prior to visit. Past Medical History:   Diagnosis Date    Anxiety with depression 1/9/2023    Attention deficit hyperactivity disorder (ADHD), predominantly inattentive type 1/9/2023     Patient Active Problem List   Diagnosis    Vaping nicotine dependence, tobacco product    Attention deficit hyperactivity disorder (ADHD), predominantly inattentive type    Anxiety with depression    De Quervain's tenosynovitis, bilateral    Tendinitis of both wrists       PE  Vitals:    02/07/23 0839   BP: 120/70   Pulse: 86   SpO2: 98%   Weight: 203 lb (92.1 kg)   Height: 5' 9\" (1.753 m)     Estimated body mass index is 29.98 kg/m² as calculated from the following:    Height as of this encounter: 5' 9\" (1.753 m). Weight as of this encounter: 203 lb (92.1 kg). Physical Exam  Vitals and nursing note reviewed. Constitutional:       General: He is not in acute distress. Appearance: Normal appearance. He is normal weight. He is not ill-appearing. HENT:      Head: Normocephalic and atraumatic. Right Ear: External ear normal.      Left Ear: External ear normal.   Cardiovascular:      Rate and Rhythm: Normal rate and regular rhythm. Pulses: Normal pulses. Heart sounds: Normal heart sounds. Pulmonary:      Effort: Pulmonary effort is normal.      Breath sounds: Normal breath sounds. Neurological:      General: No focal deficit present. Mental Status: He is alert and oriented to person, place, and time. Mental status is at baseline. Psychiatric:         Mood and Affect: Mood normal.         Behavior: Behavior normal.         Thought Content: Thought content normal.         Judgment: Judgment normal.     Ghada Krishnan DO    This dictation was generated by voice recognition computer software.   Although all attempts are made to edit the dictation for accuracy, there may be errors in the transcription that are not intended.

## 2023-02-07 NOTE — PATIENT INSTRUCTIONS
If tolerating after 1 week, increase Strattera to 80mg daily  Ask if your pharmacy has meds like \"Vyvanse\", \"Focalin\", \"Concerta\"

## 2023-02-08 ENCOUNTER — HOSPITAL ENCOUNTER (OUTPATIENT)
Dept: OCCUPATIONAL THERAPY | Age: 33
Setting detail: THERAPIES SERIES
Discharge: HOME | End: 2023-02-08
Payer: COMMERCIAL

## 2023-02-08 PROCEDURE — 97166 OT EVAL MOD COMPLEX 45 MIN: CPT

## 2023-02-08 NOTE — PROGRESS NOTES
[address] [place] [address] and [facility] [address] Jackie Egan, [address]   Phone: [phone]  Fax [phone]      Occupational Therapy Certification, Evaluation, and Treatment    Dear Dr. Lauren Plascencia,    We had the pleasure of evaluating the following patient for occupational therapy services at [facility] [place] [address]. A summary of our findings can be found in the initial assessment below. This includes our plan of care. If you have any questions or concerns regarding these findings, please do not hesitate to contact me at the office phone number above.   Thank you for the referral.       Provider Signature:_______________________________Date:__________________  By signing above (or electronic signature), therapist's plan is approved by physician      Patient: Sanjeev Harper   : 1990   MRN: [id number]       Evaluation Date: 2023        Medical Diagnosis/ICD 10:  M25.50, G89.29 - Chronic Pain of Multiple Joints    Treatment Diagnosis:  decreased ROM, decreased strength, decreased joint mobility    Onset Date:  2019 or earlier    Referral Date:  2023    Referring Physician: Dr. Juany Plummer DO                                                                Insurance information: EOB for PT indicates no authorization and 20 visits per calendar year    Precautions/ Contraindications:   Red Flag Symptoms: none  Safety awareness:intact  Other: prescribed stimulants    Adhesive allergy: NO  Latex Allergy:  [x]NO      []YES     Preferred Language for Healthcare:   [x]English       []other:    C-SSRS Triggered by Intake questionnaire (Past 2 wk assessment):   [x] No, Questionnaire did not trigger screening.   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 He also reports upper back tension, shoulder pain, and wrists. Also reports  strength reduction, popping in the shoulder, and difficulty with playing guitar. Patient reports he follows back up with MD in 1 month.     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[employer], [employer], [employer] playing      Physical and Social Environments (which aid or inhibit performance in desired occupations):    Current BADL status: Independent with ADLs    Current IADL status   Home tasks:  cleaning and scrubbing is a challenge   Work/School: some challenges at work with installing the holiday junction with 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 tables installing the plexiglass, reports popping in shoulder and tightness in chest   Community tasks: driving, guitar playing, carpentry   Driving:   yes    Personal, Temporal, and Virtual Contexts (which aid or inhibit performance in desired occupations): limited by work schedule    Personal Interests and Values: theatre    Ewelina 33 - Lives with girlfriend    OBJECTIVE    Hand dominance: right  Palpation: no issue  Posture: rounded shoulders, lumbar hyperextension seated, slight upper trap overuse BUE  Bandages/Dressings/Incisions: none  Edema: RUE wrist 19 cm, LUE wrist 18 cm    ROM WFL: []Yes [x]No (if checked, see below)     PROM AROM    L R L R   Shoulder Flexion    Full with limited mechanics in scapula Full with limited mechanics in scapula   Shoulder Abduction    Full with limited mechanics in scapula for upward rotation met by protraction Full with limited mechanics in scapula for upward rotation met by protraction   Shoulder External Rotation    Tight at end range with compensatory scapular motion Tight at end range with compensatory scapular motion   Shoulder Internal Rotation    Tight at end range with compensatory GHJ stabilization Tight at end range with compensatory GHJ stabilization   Elbow Flexion    60 60   Elbow Extension    180 180   Pronation    75 75   Supination    65 60   Wrist Flexion    70 70   Wrist Extension    60 65   Radial Deviation    15 10   Ulnar Deviation   25 35   Composite Flexion (Tip of 3rd Digit to Distal Palmar Crease (cm))   Intact, reports tightness Intact, reports tightness     Joint mobility: mild joint integrity issues in shoulder with high resistive movement reported, no evidence of subluxation this date   [x]Normal    []Hypo   []Hyper    Splinting Needs: to be determined    Strength WFL: []Yes [x]No (if checked, see below)    Strength (0-5) Left Right    Shoulder Flex/Ext 4+ 4+   Shoulder Abd 3+ 4+   Horizontal Abduction 4+ 4+   Horizontal Adduction 4+ 4+   Shoulder ER 4 4   Shoulder IR 4 4   Biceps 5 5   Triceps 5 5   Pronation  4+ 4+   Supination  4+ 4   Wrist Flex 4+ 4+   Wrist Ext 4+ 4+   Wrist Radial Deviation 5 5   Wrist Ulnar Deviation 5 5 Orthopaedic Special Tests Positive  Negative  NT Comments    Shoulder        Empty Can  +            Elbow        Cozen's  +     Tinel's   +            Hand/Wrist       Finkelstein's + but no difference between hands      Tinel's  +     Phalen's + but no difference between hands      Froment's  +     [place] Sign  +     [place] Deformity  +     [place]ck Deformity  +     [place]'s Nodes (DIP)  +     Octaviano's Nodes (PIP)  +     Mallet Finger  +     Grind Test Liana Garcia)  +                      Hand Assessment Left  Right  Comments    9 Hole Peg Test (s) NT NT     Strength (lbs) 81.3# 91.2# Approx. 10 # away from normal  strength per/age   Lateral Pinch Strength (lbs) 23, 21, 20 22, 21, 22    Palmar Pinch Strength (lbs) 15, 14, 15 18, 14, 13    Tip Pinch Strength (lbs) 9, 10, 10 10, 10, 10    Opposition (Y/N) Y Y    Box and Blocks NT NT            Functional Mobility/Transfers: intact     Assessment of UE tone/spasticity:  WFL    Sensation:  Pt denies N/T in UE. Light touch intact. Proprioception:  WFL    Visual Assessment:     Wears glasses: for reading or all the time. Hearing:  WFL  ______________________________________________________________________________________  [x] Patient history, allergies, meds reviewed. Medical chart reviewed. See intake form. Review Of Systems (ROS):  [x]Performed Review of systems (Integumentary, Cardiopulmonary, Neurological) by intake and observation. Intake form has been scanned into medical record. Patient has been instructed to contact their primary care physician regarding ROS issues if not already being addressed at this time.       Co-morbidities/Complexities (which will affect course of rehabilitation):   []None        [x]Hx of COVID   Arthritic conditions   []Rheumatoid arthritis (M05.9)  []Osteoarthritis (M19.91)  []Gout   Cardiovascular conditions   []Hypertension (I10)  []Hyperlipidemia (E78.5)  []Angina pectoris (I20)  []Atherosclerosis (I70)  []Pacemaker  []Hx of CABG/stent/  cardiac surgeries Musculoskeletal conditions   []Disc pathology   []Congenital spine pathologies   []Osteoporosis (M81.8)  []Osteopenia (M85.8)  []Scoliosis   Endocrine conditions   []Hypothyroid (E03.9)  []Hyperthyroid Gastrointestinal conditions   []Constipation (D22.02)   Metabolic conditions   []Morbid obesity (E66.01)  []Diabetes type 1(E10.65) or 2 (E11.65)   []Neuropathy (G60.9)   Cardio/Pulmonary conditions   []Asthma (J45)  []Coughing   []COPD (J44.9)  []CHF  []A-fib Psychological Disorders  [x]Anxiety (F41.9)  [x]Depression (F32.9)   []Other:   Developmental Disorders  []Autism (F84.0)  []CP (G80)  []Down Syndrome (Q90.9)  []Developmental delay     Neurological conditions  []Prior Stroke (I69.30)  []Parkinson's (G20)  []Encephalopathy (G93.40)  []MS (G35)  []Post-polio (G14)  []SCI  []TBI  []ALS Other conditions  []Fibromyalgia (M79.7)  []Vertigo  []Syncope  []Kidney Failure  []Cancer      []currently undergoing                treatment  []Pregnancy  []Incontinence Prior surgeries  []involved limb  []previous spinal surgery  [] section birth  []hysterectomy  []bowel / bladder surgery  []other relevant surgeries   Visual Conditions  []Macular degeneration  []Glaucoma  []Diabetic retinopathy  []Legally blind []Other:                   Barriers to/and or personal factors that will affect rehab potential:              time since onset    Falls Risk Assessment (30 days):   [x] Falls Risk assessed and no intervention required. [] Falls risk assessed (via clinical reasoning) and patient requires intervention due to being higher risk for falls  [] Tug Score (>12 s at risk):  [] Falls education provided, including       ASSESSMENT:    Patient demonstrates proximal weakness in upper body and inflammatory response in wrist that is present at all times and exacerbated with resistance.   He may benefit from splinting for inflammation reduction given time since onset, however, he will also require strengthening, education and neuromuscular re-education for proximal upper body work in order to alter the mechanics of his upper body movements when completing resistive and larger range movements and activities. Time since onset is over 3 years ago, and patient's outcomes may be limited by this timeframe. Functional Impairments and Activity Limitations (from functional questionnaire, intake, and interview)  Reduced participation in Instrumental ADLs  Reduced endurance  Reduced ROM  Reduced strength  Reduced posture    Participation Restrictions: work schedule     Classification :    Orthopedic - upper extremity    Prognosis/Rehab Potential:     []Excellent   []Good    []Fair   []Poor    Tolerance of evaluation/treatment:    []Excellent   []Good    []Fair   []Poor     Occupational Therapy Evaluation Complexity Justification   [x] A Occupational Profile/Medical and Therapy History  1-2 personal factors and/or comorbidities that impact the plan of care; additional review of physical, cognitive, or psychosocial performance  [x] Patient Assessment:  a total of 3-5 performance deficits relating to physical, cognitive, psychosocial limitations/restrictions  [x] A clinical presentation with:  moderate analytical complexity, detailed assessments, minimal to moderate modification of assessments, may have co-morbidities  [x] Clinical decision making of Moderatecomplexity using standardized patient assessment instrument and/or measurable assessment of functional outcome.     [] EVAL (LOW) 10393 (typically 15 minutes face-to-face)  [x] EVAL (MOD) 61151 (typically 30 minutes face-to-face)  [] EVAL (HIGH) 43194 (typically 45 minutes face-to-face)  [] RE-EVAL 98484      PLAN    Frequency/Duration:  2 days per week for 8 weeks, reduced to 1 day/week when work schedule impacts attendance    Interventions:  Therapeutic Exercise: strength training, ROM, endurance training, functional mobility training  Neuromuscular re-education: facilitation of normal movement patterns, visual-perceptual and cognitive training to restore limitations caused by neurological insult. Patient/caregiver education and training regarding Basic Activities of Daily Living  Patient/caregiver education regarding Instrumental Activities of Daily Living, including: home management  Therapeutic Activity: functional task participation that incorporates multiple parameters and treatment interventions. Patient/caregiver education on joint protection, postural re-education, activity modification, progression of HEP. Manual therapy including PROM, soft tissue mobilization, manual lymph drainage, and joint manipulations  Adaptive Equipment Education and Training  Modalities as needed that may include: Electrical Stimulation/NMES  Ultrasound  Paraffin  Hot/Cold Packs  Dry Needling  Kinesiotape    HEP instruction: Written and verbal HEP instructions provided and reviewed:  None at this time    GOALS: Goals established 2/8/23   Patient stated goal: reduce pain and improve strength  [] Progressing: [] Met: [] Not Met: [] Adjusted    STG TO BE MET IN 4 WEEKS BY 3/8/23    1. Pt will report improved Quickdash score indicating increased safety and functional independence in desired occupational pursuits. [] Progressing: [] Met: [] Not Met: [] Adjusted    2. 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    3. Patient will be I and compliance with HEP daily. LTG TO BE MET IN 4 WEEKS BY 4/7/23    Patient be able to demonstrate increased  strength for work and homemaking activities. 2. Patient will report participation in upper body resistive movements without limitation proximally.     Treatment This Date included: no treatment, treatment to be completed next session    Time In: 0805  Time Out: 0955  Timed Code Treatment Minutes: 0  Total Treatment Time: 54    Electronically signed by: Evan Berumen, OTR/L

## 2023-02-10 ENCOUNTER — HOSPITAL ENCOUNTER (OUTPATIENT)
Dept: OCCUPATIONAL THERAPY | Age: 33
Setting detail: THERAPIES SERIES
Discharge: HOME | End: 2023-02-10
Payer: COMMERCIAL

## 2023-02-10 PROCEDURE — 97110 THERAPEUTIC EXERCISES: CPT

## 2023-02-10 PROCEDURE — 97140 MANUAL THERAPY 1/> REGIONS: CPT

## 2023-02-10 PROCEDURE — 97530 THERAPEUTIC ACTIVITIES: CPT

## 2023-02-10 NOTE — FLOWSHEET NOTE
[facility]  78[address] [facility], [facility], [facility] [address]   Phone: [phone]  Fax [phone]        Outpatient Occupational Therapy     [x] Daily Treatment Note   [] Progress Note   [] Discharge Note      Date:  2/10/2023    Evaluation Date: 2/8/2023                                              Medical Diagnosis/ICD 10:  M25.50, G89.29 - Chronic Pain of Multiple Joints     Treatment Diagnosis:  decreased ROM, decreased strength, decreased joint mobility     Onset Date:  2/8/2019 or earlier     Referral Date:  1/27/2023     Referring Physician: Dr. Anthony Estrella DO                                                                 Insurance information: EOB for PT indicates no authorization and 20 visits per calendar year     Precautions/ Contraindications:   Red Flag Symptoms: none  Safety awareness:intact  Other: prescribed stimulants     Adhesive allergy: NO  Latex Allergy:  [x]NO      []YES      Preferred Language for Healthcare:   [x]English       []other:     C-SSRS Triggered by Intake questionnaire (Past 2 wk assessment):   [x] No, Questionnaire did not trigger screening.   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  Date: 02/10/2023  Prepared by: Michelle Burn    Exercises  Volar Hand Self Massage - 2-3 x daily - 7 x weekly - 3 sets - 10 reps  Dorsum Hand Self Massage - 2-3 x daily - 7 x weekly - 3 sets - 10 reps  Putty Squeezes - 2-3 x daily - 7 x weekly - 1 sets - 10 reps  Supine Chest Stretch on Foam Roll - 2-3 x daily - 7 x weekly - 1 sets - 20 reps  Supine Alternating Arm Punches on Foam Roll - 2-3 x daily - 7 x weekly - 1 sets - 20 reps  Supine on Foam Roll Reach and Roll - 2-3 x daily - 7 x weekly - 1 sets - 20 reps  [employer] on Foam Roll - 2-3 x daily - 7 x weekly - 1 sets - 20 reps  Prone W Scapular Retraction - 2-3 x daily - 7 x weekly - 1 sets - 6 reps - 6 hold  Prone I Scapular Retraction with Arms Overhead - 2-3 x daily - 7 x weekly - 1 sets - 6 reps  Prone Scapular Retraction Arms at Side - 2-3 x daily - 7 x weekly - 1 sets - 6 reps - 10 hold    Subjective: Patient states that he is also noticing challenges and discomfort in his wrist with using the mouse and bought an adapted mouse to help and it does help. He also reports some pain in his shoulder and elbow when driving. He also reports a pop in his shoulder at times. Patient also reports that his shoulder is sore right under the acromion and somewhat deep. Pain level: 4/10 in both wrists     Objective:       Exercises:    Exercises in bold performed in department today. Items not bolded are carried forward from prior visits for continuity of the record.   Exercise/Equipment Resistance/Repetitions Skilled cues Added to HEP Comments      ADL/IADL TRAINING (ADL OR TA)        []  Tactile cues   []   Verbal cues []  Yes       []  Tactile cues   []   Verbal cues []  Yes      []  Tactile cues   []   Verbal cues []  Yes       []  Tactile cues   []   Verbal cues []  Yes       []  Tactile cues   []   Verbal cues []  Yes         []  Tactile cues   []   Verbal cues []  Yes         []  Tactile cues   []   Verbal cues []  Yes         []  Tactile cues   []   Verbal cues []  Yes         []  Tactile cues   []   Verbal cues []  Yes        NEUROMUSCULAR RE-EDU and THERAPEUTIC ACTIVITY (NEURO RE-ED or TA)      Review of position for driving with scapular retraction, abdominal activation  []  Tactile cues   [x]   Verbal cues [x]  Yes       []  Tactile cues   []   Verbal cues []  Yes       []  Tactile cues   []   Verbal cues []  Yes       []  Tactile cues   []   Verbal cues []  Yes       []  Tactile cues   []   Verbal cues []  Yes         []  Tactile cues   []   Verbal cues []  Yes         []  Tactile cues   []   Verbal cues []  Yes         []  Tactile cues   []   Verbal cues []  Yes          THERAPEUTIC EXERCISE and MANUAL TECHNIQUES   (TE OR MT)      Hand AROM with light strengthening Soft putty - gripping, pinching, finger extension []  Tactile cues   [x]   Verbal cues [x]  Yes     Foam roll exercises All directions, half roll, 10x each [x]  Tactile cues   [x]   Verbal cues [x]  Yes     Prone exercises T, W, Y [x]  Tactile cues   [x]   Verbal cues [x]  Yes     Soft tissue mobilization for hand/wrist/forearm 10 minutes after heat [x]  Tactile cues   []   Verbal cues [x]  Yes       []  Tactile cues   []   Verbal cues []  Yes          []  Tactile cues   []   Verbal cues []  Yes         []  Tactile cues   []   Verbal cues []  Yes         []  Tactile cues   []   Verbal cues []  Yes        MODALITIES        Heat 5 minutes while reviewing POC  []  Yes          []  Yes        SPLINTING        Review of wrist cock-up for inflammation reduction Patient verbalizes understanding []  Tactile cues   []   Verbal cues []  Yes   []  See separate note regarding splint precautions/contraindications      []  Tactile cues   []   Verbal cues []  Yes  []  See separate note regarding splint precautions/contraindications        Treatment/Activity Tolerance:    Patients response to treatment:   [x]Patient tolerated treatment well []Patient limited by fatigue   []Patient limited by pain  []Patient limited by other medical complications   []Other:     Assessment: Patient verbalizes understanding of HEP. Patient requires high level of education regarding postural positioning for driving for reducing joint pain in shoulders and wrists. Goals:     GOALS: Goals established 2/8/23   Patient stated goal: reduce pain and improve strength  [] Progressing: [] Met: [] Not Met: [] Adjusted     STG TO BE MET IN 4 WEEKS BY 3/8/23     1. Pt will report improved Quickdash score indicating increased safety and functional independence in desired occupational pursuits. [] Progressing: [] Met: [] Not Met: [] Adjusted     2. 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     3. Patient will be I and compliance with HEP daily. LTG TO BE MET IN 4 WEEKS BY 4/7/23     Patient be able to demonstrate increased  strength for work and homemaking activities. 2. Patient will report participation in upper body resistive movements without limitation proximally.     Prognosis: []Good   [x]Fair   []Poor    Patient Requires Follow-up:  [x]Yes  []No    Plan: [x]Plan of care initiated     [x]Continue per plan of care 2x/week for 4 weeks   [] Alter current plan (see comments)    []Hold pending MD visit []Discharge    Time in: 7374  Time out: 1645    Timed Code Treatment Minutes: 68    Total Treatment Minutes:  68    Medicare Cap total YTD:   [x]N/A    Workers Comp Time Stamp  [x]N/A   Time In:   Time Out:    Electronically signed by:  RACQUEL Riojas/AGUILA

## 2023-02-13 ENCOUNTER — HOSPITAL ENCOUNTER (OUTPATIENT)
Dept: OCCUPATIONAL THERAPY | Age: 33
Setting detail: THERAPIES SERIES
Discharge: HOME | End: 2023-02-13
Payer: COMMERCIAL

## 2023-02-13 PROCEDURE — 97110 THERAPEUTIC EXERCISES: CPT

## 2023-02-13 PROCEDURE — 97140 MANUAL THERAPY 1/> REGIONS: CPT

## 2023-02-13 NOTE — FLOWSHEET NOTE
Lizzy  78. and Therapy, [facility], [address] [place] [address]   Phone: [phone]  Fax [phone]        Outpatient Occupational Therapy     [x] Daily Treatment Note   [] Progress Note   [] Discharge Note      Date:  2/13/2023    Evaluation Date: 2/8/2023                                              Medical Diagnosis/ICD 10:  M25.50, G89.29 - Chronic Pain of Multiple Joints     Treatment Diagnosis:  decreased ROM, decreased strength, decreased joint mobility     Onset Date:  2/8/2019 or earlier     Referral Date:  1/27/2023     Referring Physician: Dr. Racheal Flores DO                                                                 Insurance information: EOB for PT indicates no authorization and 20 visits per calendar year     Precautions/ Contraindications:   Red Flag Symptoms: none  Safety awareness:intact  Other: prescribed stimulants     Adhesive allergy: NO  Latex Allergy:  [x]NO      []YES      Preferred Language for Healthcare:   [x]English       []other:     C-SSRS Triggered by Intake questionnaire (Past 2 wk assessment):   [x] No, Questionnaire did not trigger screening.   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  Date: 02/13/2023  Prepared by: Roxanna Zhang    Program Notes  hand exercise - beak 3-5x/day    Exercises  Volar Hand Self Massage - 2-3 x daily - 7 x weekly - 3 sets - 10 reps  Dorsum Hand Self Massage - 2-3 x daily - 7 x weekly - 3 sets - 10 reps  Putty Squeezes - 2-3 x daily - 7 x weekly - 1 sets - 10 reps  Supine Chest Stretch on Foam Roll - 2-3 x daily - 7 x weekly - 1 sets - 20 reps  Supine Alternating Arm Punches on Foam Roll - 2-3 x daily - 7 x weekly - 1 sets - 20 reps  Supine on Foam Roll Reach and Roll - 2-3 x daily - 7 x weekly - 1 sets - 20 reps  [employer] on Foam Roll - 2-3 x daily - 7 x weekly - 1 sets - 20 reps  Prone W Scapular Retraction - 2-3 x daily - 7 x weekly - 1 sets - 6 reps - 6 hold  Prone I Scapular Retraction with Arms Overhead - 2-3 x daily - 7 x weekly - 1 sets - 6 reps  Prone Scapular Retraction Arms at Side - 2-3 x daily - 7 x weekly - 1 sets - 6 reps - 10 hold  Shoulder Internal Rotation with Resistance - 2-3 x daily - 7 x weekly - 3 sets - 10 reps  Shoulder External Rotation with Anchored Resistance with Towel Under Elbow - 2-3 x daily - 7 x weekly - 3 sets - 10 reps    Subjective: Patient states that he has been doing his exercises. He also states that his left shoulder is still popping and slightly painful when his snow angels are a high range. He has not gotten his wrist braces yet. Pain level: 3/10 in both wrists     Objective:       Exercises:    Exercises in bold performed in department today. Items not bolded are carried forward from prior visits for continuity of the record.   Exercise/Equipment Resistance/Repetitions Skilled cues Added to HEP Comments      ADL/IADL TRAINING (ADL OR TA)        []  Tactile cues   []   Verbal cues []  Yes       []  Tactile cues   []   Verbal cues []  Yes      []  Tactile cues   []   Verbal cues []  Yes       []  Tactile cues   []   Verbal cues []  Yes       [] Tactile cues   []   Verbal cues []  Yes         []  Tactile cues   []   Verbal cues []  Yes         []  Tactile cues   []   Verbal cues []  Yes         []  Tactile cues   []   Verbal cues []  Yes         []  Tactile cues   []   Verbal cues []  Yes        NEUROMUSCULAR RE-EDU and THERAPEUTIC ACTIVITY (NEURO RE-ED or TA)      Review of position for driving with scapular retraction, abdominal activation  []  Tactile cues   [x]   Verbal cues [x]  Yes       []  Tactile cues   []   Verbal cues []  Yes       []  Tactile cues   []   Verbal cues []  Yes       []  Tactile cues   []   Verbal cues []  Yes       []  Tactile cues   []   Verbal cues []  Yes         []  Tactile cues   []   Verbal cues []  Yes         []  Tactile cues   []   Verbal cues []  Yes         []  Tactile cues   []   Verbal cues []  Yes          THERAPEUTIC EXERCISE and MANUAL TECHNIQUES   (TE OR MT)      Hand AROM with light strengthening Soft putty - gripping, pinching, finger extension []  Tactile cues   [x]   Verbal cues [x]  Yes     Foam roll exercises All directions, whole roll, 2 minutes [x]  Tactile cues   [x]   Verbal cues [x]  Yes     Prone exercises T, W, Y, max cues initially, 6x each [x]  Tactile cues   [x]   Verbal cues [x]  Yes     Soft tissue mobilization for hand/wrist/forearm 10 minutes after heat [x]  Tactile cues   []   Verbal cues [x]  Yes     Shoulder external rotation 10x 5 second hold []  Tactile cues   [x]   Verbal cues [x]  Yes      Shoulder internal rotation 10x 5 second []  Tactile cues   [x]   Verbal cues [x]  Yes      Hand intrinsic strengthening with wrist extension 10x []  Tactile cues   []   Verbal cues []  Yes         []  Tactile cues   []   Verbal cues []  Yes        MODALITIES        Heat 5 minutes while reviewing POC  []  Yes          []  Yes        SPLINTING        Review of wrist cock-up for inflammation reduction Patient verbalizes understanding, has not purchased yet []  Tactile cues   []   Verbal cues []  Yes   [] See separate note regarding splint precautions/contraindications      []  Tactile cues   []   Verbal cues []  Yes  []  See separate note regarding splint precautions/contraindications      Treatment/Activity Tolerance:    Patients response to treatment:   [x]Patient tolerated treatment well []Patient limited by fatigue   []Patient limited by pain  []Patient limited by other medical complications   []Other:     Assessment: Patient verbalizes understanding of HEP. Patient requires cues for exercises for form and education regarding nature of his deficits and how to activate muscle groups for improving strength, function, and mechanics. Goals:     GOALS: Goals established 2/8/23   Patient stated goal: reduce pain and improve strength  [] Progressing: [] Met: [] Not Met: [] Adjusted     STG TO BE MET IN 4 WEEKS BY 3/8/23     1. Pt will report improved Quickdash score indicating increased safety and functional independence in desired occupational pursuits. [] Progressing: [] Met: [] Not Met: [] Adjusted     2. 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     3. Patient will be I and compliance with HEP daily. LTG TO BE MET IN 4 WEEKS BY 4/7/23     Patient be able to demonstrate increased  strength for work and homemaking activities. 2. Patient will report participation in upper body resistive movements without limitation proximally.     Prognosis: []Good   [x]Fair   []Poor    Patient Requires Follow-up:  [x]Yes  []No    Plan: []Plan of care initiated     [x]Continue per plan of care 2x/week for 4 weeks   [] Alter current plan (see comments)    []Hold pending MD visit []Discharge    Time in: 1110 Time out: 1210    Timed Code Treatment Minutes: 55    Total Treatment Minutes: 60    Medicare Cap total YTD:   [x]N/A    Workers Comp Time Stamp  [x]N/A   Time In:   Time Out:    Electronically signed by:  RACQUEL Uriarte/AGUILA

## 2023-02-14 ENCOUNTER — APPOINTMENT (OUTPATIENT)
Dept: PHYSICAL THERAPY | Age: 33
End: 2023-02-14
Payer: COMMERCIAL

## 2023-02-15 ENCOUNTER — APPOINTMENT (OUTPATIENT)
Dept: OCCUPATIONAL THERAPY | Age: 33
End: 2023-02-15
Payer: COMMERCIAL

## 2023-02-17 ENCOUNTER — HOSPITAL ENCOUNTER (OUTPATIENT)
Dept: PHYSICAL THERAPY | Age: 33
Setting detail: THERAPIES SERIES
Discharge: HOME | End: 2023-02-17
Payer: COMMERCIAL

## 2023-02-17 PROCEDURE — 97110 THERAPEUTIC EXERCISES: CPT | Performed by: PHYSICAL THERAPIST

## 2023-02-17 PROCEDURE — 97112 NEUROMUSCULAR REEDUCATION: CPT | Performed by: PHYSICAL THERAPIST

## 2023-02-17 NOTE — FLOWSHEET NOTE
[facility] - Outpatient Rehabilitation and Therapy, [address], [address]  [place]  [address]  Phone: [phone]  Fax [phone]     Physical Therapy: Daily Note   Patient: Del Regan (32 y.o. male)   Treatment Date: 2023   :  1990 MRN: [id number]   Visit #: 4   Auth needed?    []  Yes    [x]  No   Amount authorized: NA  Visit Limit: 20 Insurance: Payor: BCBS / Plan: BCBS - [place] PPO / Product Type: *No Product type* /   Insurance ID: [id number] - ([facility])  Secondary Insurance (if applicable):    Treatment Diagnosis:      ICD-10-CM    1. Pain in both knees, unspecified chronicity  M25.561     M25.562          Medical Diagnosis: Tendinitis of both wrists [M77.8]  De Quervain's tenosynovitis, bilateral [M65.4] M22.2X1, M22.2X2 (ICD-10-CM) - Patellofemoral syndrome of both knees  M25.50, G89.29 (ICD-10-CM) - Chronic pain of multiple joints   Referring Physician: Ghada Krishnan DO    PCP: Ghada Krishnan, DO   Plan of Care signed?    []  Yes [x]  No  Latex Allergy? [] Y   [x] N      Pacemaker?  [] Y   [x] N   Preferred Language for Healthcare:   [x] English       [] other:       RESTRICTIONS/PRECAUTIONS: None    Functional Outcome Measure/Scale:    Date Assessed (Eval)     LEFS (%) 41         SUBJECTIVE EXAMINATION   Pain level:  1 /10    Patient Report/Comments: Didn't have any problems after last visit.  Reports non-compliance with HEP since last visit.      OBJECTIVE EXAMINATION   Observation:     Test measurements:     TREATMENT     Exercise / Equipment / Intervention Sets / Reps / Resistance Comments / Cueing HEP   ALL THEREX BILATERAL unless stated otherwise      Standing HS stretch     Tableside HS stretch 3x30\"     SLR  2x10     SLR w/ ER (VMO focus)     Clamshells w/band RTB    SAQ     TKE X20 3\"  BTB    Prone       BRITTNY ABD  BRITTNY TKE     Leg press     Lateral band walk     LSD     TG     Wall squats X30\"     Bike x5'           REview HEP below x10'                Manual Treatment: NA      Modalities:    [x] None  [] Electrical Stimulation: for pain modulation and symptom control  [] Other:     Access Code: [email or web address]  URL: [email or web address]  Date: 02/17/2023  Prepared by: Diana LealBuilding 60    Exercises  Standing Hamstring Stretch with Step - 1-3 x daily - 7 x weekly - 3 reps - 30 hold  Supine Active Straight Leg Raise - 1-2 x daily - 7 x weekly - 1-3 sets - 10 reps  Clamshell with Resistance - 1-2 x daily - 7 x weekly - 1-3 sets - 10 reps  Prone Quadriceps Stretch with Strap - 1 x daily - 7 x weekly - 3 sets - 20-30 hold  Standing Quadriceps Stretch - 1 x daily - 7 x weekly - 3 sets - 20-30\" hold  Side Stepping with Resistance at Ankles - 1-2 x daily - 7 x weekly - 3-5 reps - 10 feet (both ways) distance  Wall Squat - 1 x daily - 7 x weekly - 3 sets - 20+ hold  Standing Terminal Knee Extension with Resistance - 1 x daily - 7 x weekly - 1-3 sets - 10 reps - 3\" hold      ASSESSMENT     Treatment/Activity Tolerance:  [x] Patient tolerated treatment well [] Patient limited by fatigue  [] Patient limited by pain  [] Patient limited by other medical complications  [] Other:     Overall Progression Towards Functional goals/ Treatment Progress Update:  [x] Patient is progressing as expected towards functional goals listed. [] Progression is slowed due to complexities/Impairments listed. [] Progression has been slowed due to co-morbidities. [] Plan just implemented, too soon (<30days) to assess goals progression   [] Goals require adjustment due to lack of progress  [] Patient is not progressing as expected and requires additional follow up with physician  [] Other:     Prognosis for POC:   [x] Good     [] Fair     [] Poor      Patient requires continued skilled intervention: [x] Yes       [] No    GOALS     Patient stated goal: Walk without pain. [x] Progressing: [] Met: [] Not Met: [] Adjusted     Therapist goals for Patient:   Short Term Goals:  To be achieved in: 2 weeks  Independent in HEP and progression per patient tolerance, in order to progress toward full function and prevent re-injury. [x] Progressing: [] Met: [] Not Met: [] Adjusted  Patient will have a decrease in pain to facilitate improvement in movement, function, and ADLs as indicated by functional deficits. [x] Progressing: [] Met: [] Not Met: [] Adjusted     Long Term Goals: To be achieved in: 6 weeks  Disability index score of 30% or less as measure by LEFS to assist with reaching prior level of function. [x] Progressing: [] Met: [] Not Met: [] Adjusted  Patient will demonstrate increased AROM to WNL, good Lumbar Spine mobility, good hip and knee ROM to allow for proper joint functioning as indicated by patients Functional Deficits. 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 [x] Progressing: [] Met: [] Not Met: [] Adjusted    TREATMENT PLAN     [x] Continue per plan of care [] Alter current plan  [] Plan of care initiated [] Hold pending MD visit  [] Discharge    BILLING     Timed Code Treatment Minutes: 45   Total Treatment Minutes: 45     If Medicare: NA  If [facility]WC: NA    CPT Code # CPT Code #     [] Eval Low (08783)    [] Gait (84912)     [] Eval Medium (76199)   [] VASO (08191)     [] Eval High (54493)   [] Estim Unattended (68322)     [] Re-Eval (42020)   [] [facility]. Traction (93368)     [x] Therex (49066)  1  [] Dry Needle 1-2 muscle (54906)     [x] Neuromusc. Re-ed (72965) 1  [] Dry Needle 3+ muscle (67978)     [] Manual (10537)   [] Group Therapy     [] Ther.  Act (38068)   []       Electronically Signed by Shelbie Ramirez PT  Date: 02/17/2023

## 2023-02-20 ENCOUNTER — HOSPITAL ENCOUNTER (OUTPATIENT)
Dept: OCCUPATIONAL THERAPY | Age: 33
Setting detail: THERAPIES SERIES
Discharge: HOME | End: 2023-02-20
Payer: COMMERCIAL

## 2023-02-20 PROCEDURE — 97110 THERAPEUTIC EXERCISES: CPT

## 2023-02-20 NOTE — FLOWSHEET NOTE
Lizzy  78. and Therapy, [facility], [address]   Phone: [phone]  Fax [phone]        Outpatient Occupational Therapy     [x] Daily Treatment Note   [] Progress Note   [] Discharge Note      Date:  2/20/2023    Evaluation Date: 2/8/2023                                              Medical Diagnosis/ICD 10:  M25.50, G89.29 - Chronic Pain of Multiple Joints     Treatment Diagnosis:  decreased ROM, decreased strength, decreased joint mobility     Onset Date:  2/8/2019 or earlier     Referral Date:  1/27/2023     Referring Physician: Dr. Mansoor Putnam DO                                                                 Insurance information: EOB for PT indicates no authorization and 20 visits per calendar year     Precautions/ Contraindications:   Red Flag Symptoms: none  Safety awareness:intact  Other: prescribed stimulants     Adhesive allergy: NO  Latex Allergy:  [x]NO      []YES      Preferred Language for Healthcare:   [x]English       []other:     C-SSRS Triggered by Intake questionnaire (Past 2 wk assessment):   [x] No, Questionnaire did not trigger screening.   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  Date: 02/13/2023  Prepared by: Rios Jones    Program Notes  hand exercise - beak 3-5x/day    Exercises  Volar Hand Self Massage - 2-3 x daily - 7 x weekly - 3 sets - 10 reps  Dorsum Hand Self Massage - 2-3 x daily - 7 x weekly - 3 sets - 10 reps  Putty Squeezes - 2-3 x daily - 7 x weekly - 1 sets - 10 reps  Supine Chest Stretch on Foam Roll - 2-3 x daily - 7 x weekly - 1 sets - 20 reps  Supine Alternating Arm Punches on Foam Roll - 2-3 x daily - 7 x weekly - 1 sets - 20 reps  Supine on Foam Roll Reach and Roll - 2-3 x daily - 7 x weekly - 1 sets - 20 reps  [employer] on Foam Roll - 2-3 x daily - 7 x weekly - 1 sets - 20 reps  Prone W Scapular Retraction - 2-3 x daily - 7 x weekly - 1 sets - 6 reps - 6 hold  Prone I Scapular Retraction with Arms Overhead - 2-3 x daily - 7 x weekly - 1 sets - 6 reps  Prone Scapular Retraction Arms at Side - 2-3 x daily - 7 x weekly - 1 sets - 6 reps - 10 hold  Shoulder Internal Rotation with Resistance - 2-3 x daily - 7 x weekly - 3 sets - 10 reps  Shoulder External Rotation with Anchored Resistance with Towel Under Elbow - 2-3 x daily - 7 x weekly - 3 sets - 10 reps    Subjective: Patient states that he is irritated in his elbow, side of his wrist and in the forearm. He states he also woke up with shoulder pain in the shoulder joint and on the top of the shoulder. Pain level: wrist: 3/10 in right, 2/10 on left     Objective:       Exercises:    Exercises in bold performed in department today. Items not bolded are carried forward from prior visits for continuity of the record.   Exercise/Equipment Resistance/Repetitions Skilled cues Added to HEP Comments      ADL/IADL TRAINING (ADL OR TA)        []  Tactile cues   []   Verbal cues []  Yes       []  Tactile cues   []   Verbal cues []  Yes      []  Tactile cues   []   Verbal cues []  Yes       []  Tactile cues   []   Verbal cues []  Yes       []  Tactile cues []   Verbal cues []  Yes         []  Tactile cues   []   Verbal cues []  Yes         []  Tactile cues   []   Verbal cues []  Yes         []  Tactile cues   []   Verbal cues []  Yes         []  Tactile cues   []   Verbal cues []  Yes        NEUROMUSCULAR RE-EDU and THERAPEUTIC ACTIVITY (NEURO RE-ED or TA)      Review of position for driving with scapular retraction, abdominal activation  []  Tactile cues   [x]   Verbal cues [x]  Yes       []  Tactile cues   []   Verbal cues []  Yes       []  Tactile cues   []   Verbal cues []  Yes       []  Tactile cues   []   Verbal cues []  Yes       []  Tactile cues   []   Verbal cues []  Yes         []  Tactile cues   []   Verbal cues []  Yes         []  Tactile cues   []   Verbal cues []  Yes         []  Tactile cues   []   Verbal cues []  Yes          THERAPEUTIC EXERCISE and MANUAL TECHNIQUES   (TE OR MT)      Hand AROM with light strengthening Soft putty - gripping, pinching, finger extension []  Tactile cues   [x]   Verbal cues [x]  Yes     Foam roll exercises All directions, whole roll, 2 minutes [x]  Tactile cues   [x]   Verbal cues [x]  Yes     Prone exercises T, W, Y, min cues initially for neck flexion and scapular squeeze, 6x each [x]  Tactile cues   [x]   Verbal cues [x]  Yes     Soft tissue mobilization for hand/wrist/forearm 10 minutes after heat [x]  Tactile cues   []   Verbal cues [x]  Yes     Shoulder external rotation with red theraband 10x 5 second hold []  Tactile cues   [x]   Verbal cues [x]  Yes      Shoulder internal rotation 10x 5 second []  Tactile cues   [x]   Verbal cues [x]  Yes      Hand intrinsic strengthening with hammer turns 10x []  Tactile cues   [x]   Verbal cues []  Yes     Kinesiotaping prior to hammer turns  For wrist extension with light tension  For carpal stabilization with moderate tension for stabilization []  Tactile cues   []   Verbal cues []  Yes        MODALITIES        Heat 5 minutes while reviewing POC  []  Yes          []  Yes SPLINTING        Review of wrist cock-up for inflammation reduction Patient verbalizes understanding, has not purchased yet []  Tactile cues   []   Verbal cues []  Yes   []  See separate note regarding splint precautions/contraindications      []  Tactile cues   []   Verbal cues []  Yes  []  See separate note regarding splint precautions/contraindications      Treatment/Activity Tolerance:    Patients response to treatment:   [x]Patient tolerated treatment well []Patient limited by fatigue   []Patient limited by pain  []Patient limited by other medical complications   []Other:     Assessment: Patient improving in less cueing needed for scapular mobilization and stabilization exercises. Patient demonstrates and verbalized reduced tightness and improved ROM after kinesiotatping for forearm and wrist.  Continue outpatient OT. Goals:     GOALS: Goals established 2/8/23   Patient stated goal: reduce pain and improve strength  [] Progressing: [] Met: [] Not Met: [] Adjusted     STG TO BE MET IN 4 WEEKS BY 3/8/23     1. Pt will report improved Quickdash score indicating increased safety and functional independence in desired occupational pursuits. [] Progressing: [] Met: [] Not Met: [] Adjusted     2. 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     3. Patient will be I and compliance with HEP daily. LTG TO BE MET IN 4 WEEKS BY 4/7/23     Patient be able to demonstrate increased  strength for work and homemaking activities. 2. Patient will report participation in upper body resistive movements without limitation proximally.     Prognosis: []Good   [x]Fair   []Poor    Patient Requires Follow-up:  [x]Yes  []No    Plan: []Plan of care initiated     [x]Continue per plan of care 2x/week for 4 weeks   [] Alter current plan (see comments)    []Hold pending MD visit []Discharge    Time in: 0901Time out: 0955    Timed Code Treatment Minutes: 54    Total Treatment Minutes: 54    Medicare Cap total YTD:   [x]N/A    Workers Comp Time Stamp  [x]N/A   Time In:   Time Out:    Electronically signed by:  RACQUEL Zapata/AGUILA

## 2023-02-24 ENCOUNTER — HOSPITAL ENCOUNTER (OUTPATIENT)
Dept: PHYSICAL THERAPY | Age: 33
Setting detail: THERAPIES SERIES
Discharge: HOME | End: 2023-02-24
Payer: COMMERCIAL

## 2023-02-24 PROCEDURE — 97110 THERAPEUTIC EXERCISES: CPT | Performed by: PHYSICAL THERAPIST

## 2023-02-24 PROCEDURE — 97112 NEUROMUSCULAR REEDUCATION: CPT | Performed by: PHYSICAL THERAPIST

## 2023-02-24 NOTE — FLOWSHEET NOTE
[facility], [facility], [address] Nadine Antigo Miri [facility], [address]  Phone: [phone]  Fax [phone]     Physical Therapy: Daily Note   Patient: Leif Thompson (25 y.o. male)   Treatment Date: 2023   :  1990 MRN: [id number]   Visit #: 5   Auth needed? []  Yes    [x]  No   Amount authorized: NA  Visit Limit: 20 Insurance: Payor: Daron Burciaga / Plan: [place] / Product Type: *No Product type* /   Insurance ID: [id number] - [facility]  Secondary Insurance (if applicable):    Treatment Diagnosis:      ICD-10-CM    1. Pain in both knees, unspecified chronicity  M25.561     M25.562          Medical Diagnosis: Tendinitis of both wrists [M77.8]  De Quervain's tenosynovitis, bilateral [M65.4] M22.2X1, M22.2X2 (ICD-10-CM) - Patellofemoral syndrome of both knees  M25.50, G89.29 (ICD-10-CM) - Chronic pain of multiple joints   Referring Physician: Luis Rossi DO    PCP: Xin Grimm, DO   Plan of Care signed? []  Yes [x]  No  Latex Allergy? [] Y   [x] N      Pacemaker? [] Y   [x] N   Preferred Language for Healthcare:   [x] English       [] other:       RESTRICTIONS/PRECAUTIONS: None    Functional Outcome Measure/Scale:    Date Assessed (Eval) 23    LEFS (%) 41 28        SUBJECTIVE EXAMINATION   Pain level:  1 /10    Patient Report/Comments: Spent 3 hours doing car work and \"everything hurts\". Has been compliant with HEP.  3/10 pain with climbing stairs.       OBJECTIVE EXAMINATION   Observation:     Test measurements:     TREATMENT     Exercise / Equipment / Intervention Sets / Reps / Resistance Comments / Cueing HEP   ALL THEREX BILATERAL unless stated otherwise      Standing HS stretch     Tableside HS stretch     SLR     SLR w/ ER (VMO focus)     Clamshells w/band RTB    SAQ     TKE X20 3\"  BTB    Standing Quad stretch 2x30\"     BRITTNY ABD  BRITTNY TKE     Leg press     Lateral band walk 3 laps GTB     LSD X10 6\"     TG     Wall squats X30\" Bike     IT band self foam roll x5     IT band self stick x5'     Review HEP below x10'                   Manual Treatment: NA      Modalities:    [x] None  [] Electrical Stimulation: for pain modulation and symptom control  [] Other:     Access Code: [email or web address]  URL: [email or web address]  Date: 02/17/2023  Prepared by: [employer]    Exercises  Standing Hamstring Stretch with Step - 1-3 x daily - 7 x weekly - 3 reps - 30 hold  Supine Active Straight Leg Raise - 1-2 x daily - 7 x weekly - 1-3 sets - 10 reps  Clamshell with Resistance - 1-2 x daily - 7 x weekly - 1-3 sets - 10 reps  Prone Quadriceps Stretch with Strap - 1 x daily - 7 x weekly - 3 sets - 20-30 hold  Standing Quadriceps Stretch - 1 x daily - 7 x weekly - 3 sets - 20-30\" hold  Side Stepping with Resistance at Ankles - 1-2 x daily - 7 x weekly - 3-5 reps - 10 feet (both ways) distance  Wall Squat - 1 x daily - 7 x weekly - 3 sets - 20+ hold  Standing Terminal Knee Extension with Resistance - 1 x daily - 7 x weekly - 1-3 sets - 10 reps - 3\" hold      ASSESSMENT     Treatment/Activity Tolerance:  [x] Patient tolerated treatment well [] Patient limited by fatigue  [] Patient limited by pain  [] Patient limited by other medical complications  [] Other:     Overall Progression Towards Functional goals/ Treatment Progress Update:  [x] Patient is progressing as expected towards functional goals listed. [] Progression is slowed due to complexities/Impairments listed. [] Progression has been slowed due to co-morbidities. [] Plan just implemented, too soon (<30days) to assess goals progression   [] Goals require adjustment due to lack of progress  [] Patient is not progressing as expected and requires additional follow up with physician  [] Other:     Prognosis for POC:   [x] Good     [] Fair     [] Poor      Patient requires continued skilled intervention: [x] Yes       [] No    GOALS     Patient stated goal: Walk without pain.   [x] Progressing: [] Met: [] Not Met: [] Adjusted     Therapist goals for Patient:   Short Term Goals: To be achieved in: 2 weeks  Independent in HEP and progression per patient tolerance, in order to progress toward full function and prevent re-injury. [] Progressing: [x] Met: [] Not Met: [] Adjusted  Patient will have a decrease in pain to facilitate improvement in movement, function, and ADLs as indicated by functional deficits. [x] Progressing: [] Met: [] Not Met: [] Adjusted     Long Term Goals: To be achieved in: 6 weeks  Disability index score of 30% or less as measure by LEFS to assist with reaching prior level of function. [x] Progressing: [] Met: [] Not Met: [] Adjusted  Patient will demonstrate increased AROM to WNL, good Lumbar Spine mobility, good hip and knee ROM to allow for proper joint functioning as indicated by patients Functional Deficits. 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 [x] Progressing: [] Met: [] Not Met: [] Adjusted    TREATMENT PLAN     [x] Continue per plan of care [] Alter current plan  [] Plan of care initiated [] Hold pending MD visit  [] Discharge    BILLING     Timed Code Treatment Minutes: 40   Total Treatment Minutes: 40     If Medicare: NA  If C: NA    CPT Code # CPT Code #     [] Eval Low (91600)    [] Gait (84911)     [] Eval Medium (82739)   [] VASO (92232)     [] Eval High (29437)   [] Estim Unattended (88264)     [] Re-Eval (51289)   [] [facility]. Traction (82927)     [x] Therex (96227)  2  [] Dry Needle 1-2 muscle (64868)     [x] Neuromusc.  Re-ed (62246) 1  [] Dry Needle 3+ muscle (97946)     [] Manual (19375)   [] Group Therapy     [] Ther.  Act (13276)   []       Electronically Signed by Moe Huang, PT  Date: 02/24/2023

## 2023-02-27 ENCOUNTER — APPOINTMENT (OUTPATIENT)
Dept: OCCUPATIONAL THERAPY | Age: 33
End: 2023-02-27
Payer: COMMERCIAL

## 2023-02-27 PROCEDURE — 97110 THERAPEUTIC EXERCISES: CPT

## 2023-02-27 NOTE — FLOWSHEET NOTE
[facility]  [address]. and Therapy, [facility], [address] [place] [address]   Phone: [phone]  Fax [phone]        Outpatient Occupational Therapy     [x] Daily Treatment Note   [] Progress Note   [] Discharge Note      Date:  2/27/2023    Evaluation Date: 2/8/2023                                              Medical Diagnosis/ICD 10:  M25.50, G89.29 - Chronic Pain of Multiple Joints     Treatment Diagnosis:  decreased ROM, decreased strength, decreased joint mobility     Onset Date:  2/8/2019 or earlier     Referral Date:  1/27/2023     Referring Physician: Dr. Deangelo Elizabeth DO                                                                 Insurance information: EOB for PT indicates no authorization and 20 visits per calendar year     Precautions/ Contraindications:   Red Flag Symptoms: none  Safety awareness:intact  Other: prescribed stimulants     Adhesive allergy: NO  Latex Allergy:  [x]NO      []YES      Preferred Language for Healthcare:   [x]English       []other:     C-SSRS Triggered by Intake questionnaire (Past 2 wk assessment):   [x] No, Questionnaire did not trigger screening.   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  Date: 02/13/2023  Prepared by: Marii Brown    Program Notes  hand exercise - beak 3-5x/day    Exercises  Volar Hand Self Massage - 2-3 x daily - 7 x weekly - 3 sets - 10 reps  Dorsum Hand Self Massage - 2-3 x daily - 7 x weekly - 3 sets - 10 reps  Putty Squeezes - 2-3 x daily - 7 x weekly - 1 sets - 10 reps  Supine Chest Stretch on Foam Roll - 2-3 x daily - 7 x weekly - 1 sets - 20 reps  Supine Alternating Arm Punches on Foam Roll - 2-3 x daily - 7 x weekly - 1 sets - 20 reps  Supine on Foam Roll Reach and Roll - 2-3 x daily - 7 x weekly - 1 sets - 20 reps  [employer] on Foam Roll - 2-3 x daily - 7 x weekly - 1 sets - 20 reps  Prone W Scapular Retraction - 2-3 x daily - 7 x weekly - 1 sets - 6 reps - 6 hold  Prone I Scapular Retraction with Arms Overhead - 2-3 x daily - 7 x weekly - 1 sets - 6 reps  Prone Scapular Retraction Arms at Side - 2-3 x daily - 7 x weekly - 1 sets - 6 reps - 10 hold  Shoulder Internal Rotation with Resistance - 2-3 x daily - 7 x weekly - 3 sets - 10 reps  Shoulder External Rotation with Anchored Resistance with Towel Under Elbow - 2-3 x daily - 7 x weekly - 3 sets - 10 reps    Access Code: [email or web address]  URL: [email or web address]  Date: 02/27/2023  Prepared by: Marii Brown    Exercises  Scapular Wall Slides - 2-3 x daily - 7 x weekly - 1 sets - 6 reps  Single Arm Low Trap Setting at Wall - 2-3 x daily - 7 x weekly - 1 sets - 6 reps - 5 hold    Subjective: Patient states that he had 2 activities recently where he irritated his shoulder when laying down and doing excessive shoulder flexion and banging. Patient states that he has been doing some of his foam roll exercises and his prone exercises. Patient states he has not done his putty exercises.     Pain level: wrist: 3/10 in right, 1/10 on left, 3/10 in right shoulder    Objective:       Hand Assessment Left  Right  Comments    9 Hole Peg Test (s) NT NT      Strength (lbs) 76# 84.7#    Lateral Pinch Strength (lbs) 22 (1 measurement) 22 (1 measurement)     Palmar Pinch Strength (lbs) 20, 19, 19 20, 20, 20     Tip Pinch Strength (lbs) 12, 12, 12 12, 12, 12     Opposition (Y/N) Y Y     Box and Blocks NT NT                  Exercises:    Exercises in bold performed in department today. Items not bolded are carried forward from prior visits for continuity of the record.   Exercise/Equipment Resistance/Repetitions Skilled cues Added to HEP Comments      ADL/IADL TRAINING (ADL OR TA)        []  Tactile cues   []   Verbal cues []  Yes       []  Tactile cues   []   Verbal cues []  Yes      []  Tactile cues   []   Verbal cues []  Yes       []  Tactile cues   []   Verbal cues []  Yes       []  Tactile cues   []   Verbal cues []  Yes         []  Tactile cues   []   Verbal cues []  Yes         []  Tactile cues   []   Verbal cues []  Yes         []  Tactile cues   []   Verbal cues []  Yes         []  Tactile cues   []   Verbal cues []  Yes        NEUROMUSCULAR RE-EDU and THERAPEUTIC ACTIVITY (NEURO RE-ED or TA)      Review of position for driving with scapular retraction, abdominal activation  []  Tactile cues   [x]   Verbal cues [x]  Yes       []  Tactile cues   []   Verbal cues []  Yes       []  Tactile cues   []   Verbal cues []  Yes       []  Tactile cues   []   Verbal cues []  Yes       []  Tactile cues   []   Verbal cues []  Yes         []  Tactile cues   []   Verbal cues []  Yes         []  Tactile cues   []   Verbal cues []  Yes         []  Tactile cues   []   Verbal cues []  Yes          THERAPEUTIC EXERCISE and MANUAL TECHNIQUES   (TE OR MT)      Lower trap activation 5x RUE/5x LUE with activation Tactile cues  Verbal cues   Yes    Wall slide 10x BUE Tactile Cues  Verbal cues Yes    Hand AROM with light strengthening Soft putty - gripping, pinching, finger extension []  Tactile cues   [x]   Verbal cues [x]  Yes     Foam roll exercises All directions, whole roll, 2 minutes, cues to stop at end ranges, increased time for stretch into scapular retraction at end ranges [x]  Tactile cues   [x]   Verbal cues [x]  Yes     Prone exercises T, W, Y, min cues initially for neck flexion and scapular squeeze, 6x each [x]  Tactile cues   [x]   Verbal cues [x]  Yes     Soft tissue mobilization for hand/wrist/forearm 10 minutes after heat [x]  Tactile cues   []   Verbal cues [x]  Yes     Shoulder external rotation with red theraband 8x 5 second hold []  Tactile cues   [x]   Verbal cues [x]  Yes      Shoulder internal rotation 8x 5 second []  Tactile cues   [x]   Verbal cues [x]  Yes      Hand intrinsic strengthening with hammer turns 10x []  Tactile cues   [x]   Verbal cues []  Yes     Kinesiotaping  For wrist extension  For shoulder external rotation []  Tactile cues   []   Verbal cues []  Yes        MODALITIES        Heat 5 minutes while reviewing POC  []  Yes          []  Yes        SPLINTING      Review of wrist cock-up for inflammation reduction Patient verbalizes understanding, reviewed fit this date and benefit of slightly longer forearm brace for improving stability []  Tactile cues   []   Verbal cues []  Yes   []  See separate note regarding splint precautions/contraindications      []  Tactile cues   []   Verbal cues []  Yes  []  See separate note regarding splint precautions/contraindications      Treatment/Activity Tolerance:    Patients response to treatment:   [x]Patient tolerated treatment well []Patient limited by fatigue   []Patient limited by pain  []Patient limited by other medical complications   []Other:     Assessment: Patient demonstrates reduced  strength but improved finger strength. Patient demonstrates increased mobility but laxity in shoulder. Patient verbalizes understanding to utilize strengthening exercises for maximizing shoulder strength.     Goals:     GOALS: Goals established 2/8/23   Patient stated goal: reduce pain and improve strength  [] Progressing: [] Met: [] Not Met: [] Adjusted     STG TO BE MET IN 4 WEEKS BY 3/8/23     1. Pt will report improved Quickdash score indicating increased safety and functional independence in desired occupational pursuits. [] Progressing: [] Met: [] Not Met: [] Adjusted     2. 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     3. Patient will be I and compliance with HEP daily. LTG TO BE MET IN 4 WEEKS BY 4/7/23     Patient be able to demonstrate increased  strength for work and homemaking activities. 2. Patient will report participation in upper body resistive movements without limitation proximally.     Prognosis: []Good   [x]Fair   []Poor    Patient Requires Follow-up:  [x]Yes  []No    Plan: []Plan of care initiated     [x]Continue per plan of care 2x/week for 4 weeks   [] Alter current plan (see comments)    []Hold pending MD visit []Discharge    Time in: 1107 Time out: 1207    Timed Code Treatment Minutes: 60    Total Treatment Minutes: 60    Medicare Cap total YTD:   [x]N/A    Workers Comp Time Stamp  [x]N/A   Time In:   Time Out:    Electronically signed by:  RACQUEL Hannon/AGUILA

## 2023-03-06 ENCOUNTER — HOSPITAL ENCOUNTER (OUTPATIENT)
Dept: OCCUPATIONAL THERAPY | Age: 33
Setting detail: THERAPIES SERIES
Discharge: HOME | End: 2023-03-06
Payer: COMMERCIAL

## 2023-03-06 ENCOUNTER — OFFICE VISIT (OUTPATIENT)
Dept: FAMILY MEDICINE CLINIC | Age: 33
End: 2023-03-06
Payer: COMMERCIAL

## 2023-03-06 VITALS
BODY MASS INDEX: 29.92 KG/M2 | HEIGHT: 69 IN | DIASTOLIC BLOOD PRESSURE: 86 MMHG | WEIGHT: 202 LBS | OXYGEN SATURATION: 98 % | HEART RATE: 88 BPM | SYSTOLIC BLOOD PRESSURE: 136 MMHG

## 2023-03-06 DIAGNOSIS — F17.200 VAPING NICOTINE DEPENDENCE, NON-TOBACCO PRODUCT: ICD-10-CM

## 2023-03-06 DIAGNOSIS — F90.0 ATTENTION DEFICIT HYPERACTIVITY DISORDER (ADHD), PREDOMINANTLY INATTENTIVE TYPE: Primary | ICD-10-CM

## 2023-03-06 DIAGNOSIS — M25.50 CHRONIC PAIN OF MULTIPLE JOINTS: ICD-10-CM

## 2023-03-06 DIAGNOSIS — G89.29 CHRONIC PAIN OF MULTIPLE JOINTS: ICD-10-CM

## 2023-03-06 DIAGNOSIS — F41.8 ANXIETY WITH DEPRESSION: ICD-10-CM

## 2023-03-06 DIAGNOSIS — R03.0 ELEVATED BLOOD PRESSURE READING: ICD-10-CM

## 2023-03-06 PROCEDURE — 99214 OFFICE O/P EST MOD 30 MIN: CPT | Performed by: STUDENT IN AN ORGANIZED HEALTH CARE EDUCATION/TRAINING PROGRAM

## 2023-03-06 PROCEDURE — 97110 THERAPEUTIC EXERCISES: CPT

## 2023-03-06 RX ORDER — ATOMOXETINE 40 MG/1
80 CAPSULE ORAL DAILY
Qty: 180 CAPSULE | Refills: 1 | Status: SHIPPED | OUTPATIENT
Start: 2023-03-06 | End: 2023-06-04

## 2023-03-06 NOTE — PROGRESS NOTES
[facility], [address], [address]  [place]  [address]  Phone: [phone]  Fax [phone]      Occupational Therapy Re-Certification Form  Dear Dr. Krishnan,    The following patient has been assessed for therapy services. Please review the attached summary of the patient's plan of care, and verify that you agree with plan for additional therapy services at this time.    Plan of Care/Treatment to date:  [x] Therapeutic Exercise  [x]  Modalities:  [x] Therapeutic Activity   [] Ultrasound [] Electrical Stimulation   [] Total Motion Release   [] Fluidotherapy [] Kinesiotaping  [x]  Neuromuscular Re-education  [] Iontophoresis [] Coldpack/hotpack   [x]  Instruction in HEP    Other:  [x]  Manual Therapy     []   Dry needling             [x] ADL/Self Care  [x] IADL Training  [] LSVT BIG  [] Saebo  [x] Splinting  [] Wheelchair Mobility                       Frequency/Duration:  # Days per week: [x] 1 day # Weeks: [] 1 week [] 5 weeks      [] 2 days   [] 2 weeks [] 6 weeks     [] 3 days   [] 3 weeks [] 7 weeks     [] 4 days   [x] 4 weeks [] 8 weeks     [] 5 days   [] 10 weeks [] 12 weeks    Rehab Potential: [] Excellent [x] Good [] Fair  [] Poor     Thank you for the referral of this patient. Please sign.     Physician signature_______________________ Date________________  By signing above, therapist’s plan is approved by physician     Fax to: [facility] [phone]     Electronically signed by:  RACQUEL Springer/L      Outpatient Occupational Therapy     [] Daily Treatment Note   [x] Progress Note   [] Discharge Note      Date:  3/6/2023    Evaluation Date: 2/8/2023                                              Medical Diagnosis/ICD 10:  M25.50, G89.29 - Chronic Pain of Multiple Joints     Treatment Diagnosis:  decreased ROM, decreased strength, decreased joint mobility     Onset Date:  2/8/2019 or earlier     Referral Date:   1/27/2023     Referring Physician: Dr. Shefali Bynum DO                                                                 Insurance information: EOB for PT indicates no authorization and 20 visits per calendar year     Precautions/ Contraindications:   Red Flag Symptoms: none  Safety awareness:intact  Other: prescribed stimulants     Adhesive allergy: NO  Latex Allergy:  [x]NO      []YES      Preferred Language for Healthcare:   [x]English       []other:     C-SSRS Triggered by Intake questionnaire (Past 2 wk assessment):   [x] No, Questionnaire did not trigger screening.   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 throughout     Functional Outcome Measure:   2/8/2023: QuickDASH: 22  3/6/23: QuickDASH: 20     Patient goal for therapy:  \"help with minimizing overuse of hand/wrist*\"      _______________________________________________________________________    Plan of care signed:    [x]Yes date:   Ghada Krishnan DO at 2/8/2023           []No           Next Progress Note due:   today, 4/5/23    Visit# / total visits:  5/4-8    Plan for Next Session:  review HEP    Objective:        OBJECTIVE     Hand dominance: right  Palpation: no issue  Posture: rounded shoulders, lumbar hyperextension seated, slight upper trap overuse BUE     Joint mobility: mild joint integrity issues in shoulder with high resistive movement reported, no evidence of subluxation this date              []Normal                      []Hypo              [x]Hypermobility in shoulder joint and carpals of wrist     Splinting Needs: to be determined     Strength WFL: []Yes [x]No (if checked, see below)     Strength (0-5) Left Right    Shoulder Flex/Ext 4+ 4+   Shoulder Abd 4+ 4+ Horizontal Abduction 4+ 4+   Horizontal Adduction 4+ 4+   Shoulder ER 4+ 4+   Shoulder IR 4+ 4+   Biceps 4+ 4+   Triceps 5 5   Pronation  4 4   Supination  4 4   Wrist Flex 4+ 4+   Wrist Ext 4+ 4+   Wrist Radial Deviation 5 5   Wrist Ulnar Deviation 5 5                 Hand Assessment Left  Right  Comments    9 Hole Peg Test (s) NT NT      Strength (lbs) 72.6# 92.7# Approx. 10 # away from normal  strength per/age   Lateral Pinch Strength (lbs) 22, 20, 22 22, 20, 22     Palmar Pinch Strength (lbs) 15, 15, 15 20, 18, 16     Tip Pinch Strength (lbs) 10, 10, 10 10, 10, 9     Opposition (Y/N) Y Y     Box and Blocks NT NT                  Functional Mobility/Transfers: intact     Assessment of UE tone/spasticity:  WFL     Sensation:  Pt denies N/T in UE. Light touch intact     Proprioception:  WFL     Visual Assessment:     Wears glasses: for reading or all the time. Hearing:  Pinchd      Home Exercise Program:    Access Code: [email or web address]  URL: [email or web address]  Date: 02/13/2023  Prepared by: Gwen Wynn    Program Notes  hand exercise - beak 3-5x/day    Exercises  Volar Hand Self Massage - 2-3 x daily - 7 x weekly - 3 sets - 10 reps  Dorsum Hand Self Massage - 2-3 x daily - 7 x weekly - 3 sets - 10 reps  Putty Squeezes - 2-3 x daily - 7 x weekly - 1 sets - 10 reps  Supine Chest Stretch on Foam Roll - 2-3 x daily - 7 x weekly - 1 sets - 20 reps  Supine Alternating Arm Punches on Foam Roll - 2-3 x daily - 7 x weekly - 1 sets - 20 reps  Supine on Foam Roll Reach and Roll - 2-3 x daily - 7 x weekly - 1 sets - 20 reps  [employer] on Foam Roll - 2-3 x daily - 7 x weekly - 1 sets - 20 reps  Prone W Scapular Retraction - 2-3 x daily - 7 x weekly - 1 sets - 6 reps - 6 hold  Prone I Scapular Retraction with Arms Overhead - 2-3 x daily - 7 x weekly - 1 sets - 6 reps  Prone Scapular Retraction Arms at Side - 2-3 x daily - 7 x weekly - 1 sets - 6 reps - 10 hold  Shoulder Internal Rotation with Resistance - 2-3 x daily - 7 x weekly - 3 sets - 10 reps  Shoulder External Rotation with Anchored Resistance with Towel Under Elbow - 2-3 x daily - 7 x weekly - 3 sets - 10 reps    Access Code: [email or web address]  URL: [email or web address]  Date: 02/27/2023  Prepared by: Vipul Dumas    Exercises  Scapular Wall Slides - 2-3 x daily - 7 x weekly - 1 sets - 6 reps  Single Arm Low Trap Setting at Wall - 2-3 x daily - 7 x weekly - 1 sets - 6 reps - 5 hold    Subjective: Patient states that he is doing a little better with his pain in his shoulder but hasn't had much improvement in his hands. Patient also reports that he is wearing his hand braces and still needs to purchase a longer wrist brace. Pain level: wrist: 3/10 in right, 1/10 on left, 3/10 in right shoulder    Objective:       OBJECTIVE     Hand dominance: right  Palpation: no issue  Posture: rounded shoulders, lumbar hyperextension seated, slight upper trap overuse BUE     Joint mobility: mild joint integrity issues in shoulder with high resistive movement reported, no evidence of subluxation this date              []Normal                      []Hypo              [x]Hypermobility in shoulder joint and carpals of wrist     Splinting Needs: to be determined     Strength WFL: []Yes [x]No (if checked, see below)     Strength (0-5) Left Right    Shoulder Flex/Ext 4+ 4+   Shoulder Abd 4+ 4+   Horizontal Abduction 4+ 4+   Horizontal Adduction 4+ 4+   Shoulder ER 4+ 4+   Shoulder IR 4+ 4+   Biceps 4+ 4+   Triceps 5 5   Pronation  4 4   Supination  4 4   Wrist Flex 4+ 4+   Wrist Ext 4+ 4+   Wrist Radial Deviation 5 5   Wrist Ulnar Deviation 5 5                 Hand Assessment Left  Right  Comments    9 Hole Peg Test (s) NT NT      Strength (lbs) 72.6# 92.7# Approx.  10 # away from normal  strength per/age   Lateral Pinch Strength (lbs) 22, 20, 22 22, 20, 22     Palmar Pinch Strength (lbs) 15, 15, 15 20, 18, 16     Tip Pinch Strength (lbs) 10, 10, 10 10, 10, 9     Opposition (Y/N) Y Y     Box and Blocks NT NT                  Functional Mobility/Transfers: intact     Assessment of UE tone/spasticity:  WFL     Sensation:  Pt denies N/T in UE. Light touch intact     Proprioception:  WFL     Visual Assessment:     Wears glasses: for reading or all the time. Hearing:  [facility] [facility]    Treatment/Activity Tolerance:    Patients response to treatment:   [x]Patient tolerated treatment well []Patient limited by fatigue   []Patient limited by pain  []Patient limited by other medical complications   []Other:     Assessment: Patient has participated in 5 outpatient OT visits over the past 4 weeks. He is progressing with HEP and is demonstrating slight subjective improvement in reduced inflammation in wrist. He demonstrates improved mobility in shoulders but also hypermobility in carpals and GHJ. He also demonstrates improved shoulder strength. He verbalizes understanding to continue with HEP and reduce participation in activities that require internal and end range positions of shoulder and wrist during work while he addresses strengthening. He also verbalizes understanding to transition to strengthening exercises for shoulder. Patient's progress is minimal at this point due to the chronic nature of his injury but given his improvements in mobility and strength, he is a good rehab candidate and requires additional supervision of HEP to maximize his progress and independence. In addition, patient reports hypermobility in spine and would benefit from additional assessment by physical therapy of his spine to address weakness and mobility in that area. Goals:     GOALS: Goals established 2/8/23   Patient stated goal: reduce pain and improve strength  [] Progressing: [] Met: [] Not Met: [] Adjusted     STG TO BE MET IN 4 WEEKS BY 3/8/23     1. Pt will report improved Quickdash score indicating increased safety and functional independence in desired occupational pursuits.   [x] Progressing: [] Met: [] Not Met: [] Adjusted     2. 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 - tight already opened container no issue on 3/6/23     3. Patient will be I and compliance with HEP daily. - PROGRESSING 3/6/23     LTG TO BE MET IN 8 WEEKS BY 4/7/23     Patient be able to demonstrate increased  strength for work and homemaking activities. 2. Patient will report participation in upper body resistive movements without limitation proximally.     Prognosis: []Good   [x]Fair   []Poor    Patient Requires Follow-up:  [x]Yes  []No    Plan: []Plan of care initiated     []Continue per plan of care 2x/week for 4 weeks   [x] Alter current plan (additional outpatient OT 1x week for 4 weeks or 1x every other week as schedule allows)    []Hold pending MD visit []Discharge    Time in: 1235 Time out: 1330    Timed Code Treatment Minutes: 55    Total Treatment Minutes: 55    Medicare Cap total YTD:   [x]N/A    Workers Comp Time Stamp  [x]N/A   Time In:   Time Out:    Electronically signed by:  Tray Houston OTR/L

## 2023-03-06 NOTE — FLOWSHEET NOTE
[facility], [facility], [address] [place] [address]   Phone: [phone]  Fax [phone]        Outpatient Occupational Therapy     [x] Daily Treatment Note   [] Progress Note   [] Discharge Note      Date:  3/6/2023    Evaluation Date: 2/8/2023                                              Medical Diagnosis/ICD 10:  M25.50, G89.29 - Chronic Pain of Multiple Joints     Treatment Diagnosis:  decreased ROM, decreased strength, decreased joint mobility     Onset Date:  2/8/2019 or earlier     Referral Date:  1/27/2023     Referring Physician: Dr. Rajiv Putnam DO                                                                 Insurance information: EOB for PT indicates no authorization and 20 visits per calendar year     Precautions/ Contraindications:   Red Flag Symptoms: none  Safety awareness:intact  Other: prescribed stimulants     Adhesive allergy: NO  Latex Allergy:  [x]NO      []YES      Preferred Language for Healthcare:   [x]English       []other:     C-SSRS Triggered by Intake questionnaire (Past 2 wk assessment):   [x] No, Questionnaire did not trigger screening.   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  Date: 02/13/2023  Prepared by: Saintclair Ferns    Program Notes  hand exercise - beak 3-5x/day    Exercises  Volar Hand Self Massage - 2-3 x daily - 7 x weekly - 3 sets - 10 reps  Dorsum Hand Self Massage - 2-3 x daily - 7 x weekly - 3 sets - 10 reps  Putty Squeezes - 2-3 x daily - 7 x weekly - 1 sets - 10 reps  Supine Chest Stretch on Foam Roll - 2-3 x daily - 7 x weekly - 1 sets - 20 reps  Supine Alternating Arm Punches on Foam Roll - 2-3 x daily - 7 x weekly - 1 sets - 20 reps  Supine on Foam Roll Reach and Roll - 2-3 x daily - 7 x weekly - 1 sets - 20 reps  [employer] on Foam Roll - 2-3 x daily - 7 x weekly - 1 sets - 20 reps  Prone W Scapular Retraction - 2-3 x daily - 7 x weekly - 1 sets - 6 reps - 6 hold  Prone I Scapular Retraction with Arms Overhead - 2-3 x daily - 7 x weekly - 1 sets - 6 reps  Prone Scapular Retraction Arms at Side - 2-3 x daily - 7 x weekly - 1 sets - 6 reps - 10 hold  Shoulder Internal Rotation with Resistance - 2-3 x daily - 7 x weekly - 3 sets - 10 reps  Shoulder External Rotation with Anchored Resistance with Towel Under Elbow - 2-3 x daily - 7 x weekly - 3 sets - 10 reps    Access Code: [email or web address]  URL: [email or web address]  Date: 02/27/2023  Prepared by: Saintclair Ferns    Exercises  Scapular Wall Slides - 2-3 x daily - 7 x weekly - 1 sets - 6 reps  Single Arm Low Trap Setting at Wall - 2-3 x daily - 7 x weekly - 1 sets - 6 reps - 5 hold    Subjective: Patient states that he is doing a little better with his pain in his shoulder but hasn't had much improvement in his hands. Patient also reports that he is wearing his hand braces and still needs to purchase a longer wrist brace.     Pain level: wrist: 3/10 in right, 1/10 on left, 3/10 in right shoulder    Objective:       OBJECTIVE     Hand dominance: right  Palpation: no issue  Posture: rounded shoulders, lumbar hyperextension seated, slight upper trap overuse BUE     Joint mobility: mild joint integrity issues in shoulder with high resistive movement reported, no evidence of subluxation this date              []Normal                      []Hypo              [x]Hypermobility in shoulder joint and carpals of wrist     Splinting Needs: to be determined     Strength WFL: []Yes [x]No (if checked, see below)     Strength (0-5) Left Right    Shoulder Flex/Ext 4+ 4+   Shoulder Abd 4+ 4+   Horizontal Abduction 4+ 4+   Horizontal Adduction 4+ 4+   Shoulder ER 4+ 4+   Shoulder IR 4+ 4+   Biceps 4+ 4+   Triceps 5 5   Pronation  4 4   Supination  4 4   Wrist Flex 4+ 4+   Wrist Ext 4+ 4+   Wrist Radial Deviation 5 5   Wrist Ulnar Deviation 5 5                 Hand Assessment Left  Right  Comments    9 Hole Peg Test (s) NT NT      Strength (lbs) 72.6# 92.7# Approx. 10 # away from normal  strength per/age   Lateral Pinch Strength (lbs) 22, 20, 22 22, 20, 22     Palmar Pinch Strength (lbs) 15, 15, 15 20, 18, 16     Tip Pinch Strength (lbs) 10, 10, 10 10, 10, 9     Opposition (Y/N) Y Y     Box and Blocks NT NT                  Functional Mobility/Transfers: intact     Assessment of UE tone/spasticity:  WFL     Sensation:  Pt denies N/T in UE. Light touch intact     Proprioception:  WFL     Visual Assessment:     Wears glasses: for reading or all the time. Hearing:  [facility]    Exercises:    Exercises in bold performed in department today. Items not bolded are carried forward from prior visits for continuity of the record.   Exercise/Equipment Resistance/Repetitions Skilled cues Added to HEP Comments      ADL/IADL TRAINING (ADL OR TA)        []  Tactile cues   []   Verbal cues []  Yes       []  Tactile cues   []   Verbal cues []  Yes      []  Tactile cues   []   Verbal cues []  Yes       []  Tactile cues   []   Verbal cues []  Yes       []  Tactile cues   []   Verbal cues []  Yes         []  Tactile cues   []   Verbal cues []  Yes         [] Tactile cues   []   Verbal cues []  Yes         []  Tactile cues   []   Verbal cues []  Yes         []  Tactile cues   []   Verbal cues []  Yes        NEUROMUSCULAR RE-EDU and THERAPEUTIC ACTIVITY (NEURO RE-ED or TA)      Review of position for driving with scapular retraction, abdominal activation  []  Tactile cues   [x]   Verbal cues [x]  Yes       []  Tactile cues   []   Verbal cues []  Yes       []  Tactile cues   []   Verbal cues []  Yes       []  Tactile cues   []   Verbal cues []  Yes       []  Tactile cues   []   Verbal cues []  Yes         []  Tactile cues   []   Verbal cues []  Yes         []  Tactile cues   []   Verbal cues []  Yes         []  Tactile cues   []   Verbal cues []  Yes          THERAPEUTIC EXERCISE and MANUAL TECHNIQUES   (TE OR MT)      Lower trap activation 5x RUE/5x LUE with activation Tactile cues  Verbal cues   Yes    Wall slide 10x BUE Tactile Cues  Verbal cues Yes    Hand AROM with light strengthening Soft putty - gripping, pinching, finger extension []  Tactile cues   [x]   Verbal cues [x]  Yes     Foam roll exercises All directions, whole roll, 2 minutes, cues to stop at end ranges, increased time for stretch into scapular retraction at end ranges [x]  Tactile cues   [x]   Verbal cues [x]  Yes     Prone exercises T, W, Y, min cues initially for neck flexion and scapular squeeze, 6x each [x]  Tactile cues   [x]   Verbal cues [x]  Yes     Soft tissue mobilization for hand/wrist/forearm 10 minutes after heat [x]  Tactile cues   []   Verbal cues [x]  Yes     Shoulder external rotation with red theraband 8x 5 second hold []  Tactile cues   [x]   Verbal cues [x]  Yes      Shoulder internal rotation 8x 5 second []  Tactile cues   [x]   Verbal cues [x]  Yes      Hand intrinsic strengthening with hammer turns 10x []  Tactile cues   [x]   Verbal cues []  Yes     Kinesiotaping  For wrist extension  For shoulder external rotation []  Tactile cues   []   Verbal cues []  Yes        MODALITIES Heat 5 minutes while reviewing POC  []  Yes          []  Yes        SPLINTING      Review of wrist cock-up for inflammation reduction Patient verbalizes understanding, reviewed fit this date and benefit of slightly longer forearm brace for improving stability []  Tactile cues   []   Verbal cues []  Yes   []  See separate note regarding splint precautions/contraindications      []  Tactile cues   []   Verbal cues []  Yes  []  See separate note regarding splint precautions/contraindications      Treatment/Activity Tolerance:    Patients response to treatment:   [x]Patient tolerated treatment well []Patient limited by fatigue   []Patient limited by pain  []Patient limited by other medical complications   []Other:     Assessment: Patient is progressing with HEP and is demonstrating slight subjective improvement in reduced inflammation. He demonstrates improved mobility in shoulders but also hypermobility in carpals and GHJ. He also demonstrates improved shoulder strength. He verbalizes understanding to continue with HEP and reduce participation in activities that require internal rotation positions of shoulder and wrist.  He also verbalizes understanding to transition to strengthening exercises for shoulder. Patient's progress is minimal at this point due to the chronic nature of his injury but given his improvements in mobility and strength, he is a good rehab candidate and requires additional supervision of HEP to maximize his progress and independence. In addition, patient reports hypermobility in spine and would benefit from additional assessment by physical therapy of his spine to address weakness and mobility in that area. Goals:     GOALS: Goals established 2/8/23   Patient stated goal: reduce pain and improve strength  [] Progressing: [] Met: [] Not Met: [] Adjusted     STG TO BE MET IN 4 WEEKS BY 3/8/23     1.  Pt will report improved Quickdash score indicating increased safety and functional independence in desired occupational pursuits. [x] Progressing: [] Met: [] Not Met: [] Adjusted     2. 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 - tight already opened container no issue on 3/6/23     3. Patient will be I and compliance with HEP daily. - PROGRESSING 3/6/23     LTG TO BE MET IN 8 WEEKS BY 4/7/23     Patient be able to demonstrate increased  strength for work and homemaking activities. 2. Patient will report participation in upper body resistive movements without limitation proximally.     Prognosis: []Good   [x]Fair   []Poor    Patient Requires Follow-up:  [x]Yes  []No    Plan: []Plan of care initiated     []Continue per plan of care 2x/week for 4 weeks   [x] Alter current plan (additional outpatient OT 1x week for 4 weeks or 1x every other week as schedule allows)    []Hold pending MD visit []Discharge    Time in: 1235 Time out: 1330    Timed Code Treatment Minutes: 55    Total Treatment Minutes: 55    Medicare Cap total YTD:   [x]N/A    Workers Comp Time Stamp  [x]N/A   Time In:   Time Out:    Electronically signed by:  RACQUEL Heard/L

## 2023-03-06 NOTE — PROGRESS NOTES
[facility]  3/6/2023    Lissette Loyola (:  1990) is a 34 y.o. male, here for evaluation of the following medical concerns:    Chief Complaint   Patient presents with    Follow-up    ADHD        ASSESSMENT/ PLAN  1. Attention deficit hyperactivity disorder (ADHD), predominantly inattentive type  Stable. Doing well on Strattera. Although patient reports had one-time palpitation and episodes of insomnia. We have discussed trialing 40 mg twice daily and we have also discussed trialing at the escalation in the future. Patient would like to try continue current dose at this time and return to clinic sooner if he would like to change dosing of the Strattera. - atomoxetine (STRATTERA) 40 MG capsule; Take 2 capsules by mouth daily  Dispense: 180 capsule; Refill: 1    2. Anxiety with depression  Continue behavioral health. Doing well with management via CBT    3. Chronic pain of multiple joints  Established with OT and improving with NSAIDs    4. Elevated blood pressure reading  We will continue to monitor. No known history of hypertension. Can be secondary to Strattera although we have discussed even other ADHD medications can also increase blood pressure. For now patient would like to continue on Strattera    5. Vaping nicotine dependence, non-tobacco product  Discussed cessation. Patient is contemplative about quitting    Return in about 3 months (around 2023) for elevated blood pressure. Education provided regarding visit today. See visit diagnoses, orders and follow up  recommendations. Portions of record reviewed for pertinent issues: active problem list,medication list,allergies,social history, health maintenance. Discussed probable diagnosis, test results if available in office today and management options with patient: patient agreed to a medical plan. Will contact patient for results. HPI    Adult ADHD f/u  Since last visit: started Strattera, now 80mg daily. Doing well. Reports  with attention/memory (concentration, planning or completing tasks, forgetfulness, absentmindedness, being organized),   Medication compliance: all of the time. During last visit 1 month ago, pt started on Strattera 80mg dailySide effects from medication include: none.  has been reviewed. Anxiety/depression  - last visit, pt scores AIMEE-7 6 c/w mild anxiety and PPHQ9 of 5 c/w mild depression. Pt following with behavioral health. Not on pharmacological therapy  - no SI, HI    Vaping less amount after starting Strattera    ROS  Review of Systems    HISTORIES  Current Outpatient Medications on File Prior to Visit   Medication Sig Dispense Refill    Naproxen Sodium (ALEVE PO) Take by mouth      fexofenadine (ALLEGRA) 180 MG tablet Take by mouth daily      fluticasone (FLONASE) 50 MCG/ACT nasal spray 2 sprays by Each Nostril route daily 16 g 0     No current facility-administered medications on file prior to visit. Past Medical History:   Diagnosis Date    Anxiety with depression 1/9/2023    Attention deficit hyperactivity disorder (ADHD), predominantly inattentive type 1/9/2023     Patient Active Problem List   Diagnosis    Vaping nicotine dependence, tobacco product    Attention deficit hyperactivity disorder (ADHD), predominantly inattentive type    Anxiety with depression    De Quervain's tenosynovitis, bilateral    Tendinitis of both wrists       PE  Vitals:    03/06/23 1004 03/06/23 1027   BP: 138/64 136/86   Pulse: 88    SpO2: 98%    Weight: 202 lb (91.6 kg)    Height: 5' 9\" (1.753 m)      Estimated body mass index is 29.83 kg/m² as calculated from the following:    Height as of this encounter: 5' 9\" (1.753 m). Weight as of this encounter: 202 lb (91.6 kg). Physical Exam  Vitals and nursing note reviewed. Constitutional:       General: He is not in acute distress. Appearance: Normal appearance. He is normal weight. He is not ill-appearing.    HENT:      Head: Normocephalic and atraumatic. Right Ear: External ear normal.      Left Ear: External ear normal.   Cardiovascular:      Rate and Rhythm: Normal rate and regular rhythm. Pulses: Normal pulses. Heart sounds: Normal heart sounds. Pulmonary:      Effort: Pulmonary effort is normal.      Breath sounds: Normal breath sounds. Musculoskeletal:      Cervical back: Normal range of motion and neck supple. No tenderness. Lymphadenopathy:      Cervical: No cervical adenopathy. Neurological:      General: No focal deficit present. Mental Status: He is alert and oriented to person, place, and time. Mental status is at baseline. Ghada Krishnan DO    This dictation was generated by voice recognition computer software. Although all attempts are made to edit the dictation for accuracy, there may be errors in the transcription that are not intended.

## 2023-03-20 ENCOUNTER — HOSPITAL ENCOUNTER (OUTPATIENT)
Dept: OCCUPATIONAL THERAPY | Age: 33
Setting detail: THERAPIES SERIES
Discharge: HOME | End: 2023-03-20
Payer: COMMERCIAL

## 2023-03-20 NOTE — PROGRESS NOTES
[facility], [facility],  Jackie Pike  [place], [address]  Phone: [phone]  Fax [phone]        Patient Name:  Quentin Devlin  :  1990   Date:  3/20/2023  Cancels to Date: 0  No-shows to Date: 1    For today's appointment patient:  [] Cancelled  [] Rescheduled appointment  [x] No-show     Reason given by patient:  [] Patient ill  [] Conflicting appointment  [] No transportation    [] Conflict with work  [] No reason given  [] Other:     Comments:      [] Phone call from patient to clinic later  [x] Phone call or communication made to patient later, patient requesting to reschedule for 3/27 at 330.     Electronically signed by:  RACQUEL Bell/AGUILA

## 2023-03-27 ENCOUNTER — HOSPITAL ENCOUNTER (OUTPATIENT)
Dept: OCCUPATIONAL THERAPY | Age: 33
Setting detail: THERAPIES SERIES
Discharge: HOME | End: 2023-03-27
Payer: COMMERCIAL

## 2023-03-27 PROCEDURE — 97110 THERAPEUTIC EXERCISES: CPT

## 2023-03-27 NOTE — FLOWSHEET NOTE
plan (additional outpatient OT 1x week for 4 weeks or 1x every other week as schedule allows)    []Hold pending MD visit []Discharge    Time in: 1540  Time out: 1635    Timed Code Treatment Minutes: 55    Total Treatment Minutes: 55    Medicare Cap total YTD:   [x]N/A    Workers Comp Time Stamp  [x]N/A   Time In:   Time Out:    Electronically signed by:  RACQUEL Rabago/AGUILA

## 2023-03-27 NOTE — PROGRESS NOTES
- 2-3 x daily - 7 x weekly - 1 sets - 10 reps  - Thoracic Mobilization Hug  - 2-3 x daily - 7 x weekly - 1 sets - 20 reps  - Supine Reach and Roll  - 2-3 x daily - 7 x weekly - 1 sets - 20 reps  - Thoracic Y  - 2-3 x daily - 7 x weekly - 1 sets - 20 reps  - [place] on Foam Roll  - 2-3 x daily - 7 x weekly - 1 sets - 20 reps  - Prone W Scapular Retraction  - 2-3 x daily - 7 x weekly - 1 sets - 10 reps  - Prone Scapular Retraction in Flexion  - 2-3 x daily - 7 x weekly - 1 sets - 10 reps  - Prone Scapular Retraction Arms at Side  - 2-3 x daily - 7 x weekly - 1 sets - 10 reps    Subjective: Patient states that he has not had any new trauma in his shoulder. He states he continues to have pain with repetitive  in his right and left, although the right is a little worse. Patient states that he feels like he is progressing slightly with his function in his shoulder but that he is progressing well with     Pain level: wrist: 2/10 in right, 2/10 wrist on left, 2-3/10 in right shoulder    Objective:          Hand dominance: right  Palpation: no issue  Posture: rounded shoulders, lumbar hyperextension seated, slight upper trap overuse BUE     Joint mobility: mild joint integrity issues in shoulder with high resistive movement reported, no evidence of subluxation this date              []Normal                      []Hypo              [x]Hypermobility in shoulder joint and carpals of wrist     Splinting Needs: to be determined     Strength WFL: []Yes [x]No (if checked, see below)          Hand Assessment Left  Right  Comments    9 Hole Peg Test (s) NT NT      Strength (lbs) 76# 87# Approx.  20# away from normal  strength for age   Lateral Pinch Strength (lbs) NT NT     Palmar Pinch Strength (lbs) NT NT     Tip Pinch Strength (lbs) NT NT     Opposition (Y/N) Y Y     Box and Blocks NT NT                  Functional Mobility/Transfers: intact     Assessment of UE tone/spasticity:  WFL     Sensation:  Pt denies N/T

## 2023-04-17 ENCOUNTER — HOSPITAL ENCOUNTER (OUTPATIENT)
Dept: OCCUPATIONAL THERAPY | Age: 33
Setting detail: THERAPIES SERIES
Discharge: HOME | End: 2023-04-17
Payer: COMMERCIAL

## 2023-04-17 PROCEDURE — 97110 THERAPEUTIC EXERCISES: CPT

## 2023-04-17 PROCEDURE — 97140 MANUAL THERAPY 1/> REGIONS: CPT

## 2023-04-17 NOTE — FLOWSHEET NOTE
strengthening. He is more compliant with braces but is still working on finding the right fit. He is less compliant with shoulder exercises due to varying levels of work and medical issues with his significant other that he has been managing. He verbalizes understanding of plans to get back on track with he exercises. GOALS: Goals established 2/8/23, CONTINUE GOALS FOR 8 WEEKS  Patient stated goal: reduce pain and improve strength  [] Progressing: [] Met: [] Not Met: [] Adjusted     STG TO BE MET IN 4 WEEKS BY 3/8/23, CONTINUE GOALS UNTIL 4/26/23     1. Pt will report improved Quickdash score indicating increased safety and functional independence in desired occupational pursuits. [x] Progressing: [] Met: [] Not Met: [] Adjusted     2. 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 - tight already opened container no issue on 3/6/23     3. Patient will be I and compliance with HEP daily. - PROGRESSING 3/6/23     LTG TO BE MET IN 8 WEEKS BY 4/7/23, CONTINUE GOALS UNTIL 5/24/23     Patient be able to demonstrate increased  strength for work and homemaking activities. - PROGRESSING FOR LUE 3/27, CONTINUE BRACING FOR RUE NEEDED AT THIS TIME, CONTINUE GOAL     2.  Patient will report participation in upper body resistive movements without limitation proximally. - PROGRESSING 3/27    Prognosis: []Good   [x]Fair   []Poor    Patient Requires Follow-up:  [x]Yes  []No    Plan: []Plan of care initiated     [x]Continue per plan of care (additional outpatient OT 1x week for 4 weeks or 1x every other week as schedule allows)    [] Alter current plan (additional outpatient OT 1x week for 4 weeks or 1x every other week as schedule allows)    []Hold pending MD visit []Discharge    Time in: 1340  Time out: 1430    Timed Code Treatment Minutes: 50    Total Treatment Minutes: 50    Medicare Cap total YTD:   [x]N/A    Workers Comp Time Stamp  [x]N/A   Time In:   Time

## 2023-08-02 ENCOUNTER — OFFICE VISIT (OUTPATIENT)
Dept: FAMILY MEDICINE CLINIC | Age: 33
End: 2023-08-02
Payer: COMMERCIAL

## 2023-08-02 VITALS
HEART RATE: 100 BPM | HEIGHT: 69 IN | WEIGHT: 198.6 LBS | OXYGEN SATURATION: 98 % | DIASTOLIC BLOOD PRESSURE: 74 MMHG | SYSTOLIC BLOOD PRESSURE: 120 MMHG | BODY MASS INDEX: 29.41 KG/M2

## 2023-08-02 DIAGNOSIS — F90.0 ATTENTION DEFICIT HYPERACTIVITY DISORDER (ADHD), PREDOMINANTLY INATTENTIVE TYPE: Primary | ICD-10-CM

## 2023-08-02 DIAGNOSIS — L60.0 INGROWN TOENAIL: ICD-10-CM

## 2023-08-02 DIAGNOSIS — Z12.83 SCREENING EXAM FOR SKIN CANCER: ICD-10-CM

## 2023-08-02 DIAGNOSIS — K59.00 CONSTIPATION, UNSPECIFIED CONSTIPATION TYPE: ICD-10-CM

## 2023-08-02 DIAGNOSIS — R19.5 CHANGE IN STOOL: ICD-10-CM

## 2023-08-02 DIAGNOSIS — M79.642 PAIN IN BOTH HANDS: ICD-10-CM

## 2023-08-02 DIAGNOSIS — M79.641 PAIN IN BOTH HANDS: ICD-10-CM

## 2023-08-02 DIAGNOSIS — R06.83 SNORING: ICD-10-CM

## 2023-08-02 PROCEDURE — 99215 OFFICE O/P EST HI 40 MIN: CPT | Performed by: STUDENT IN AN ORGANIZED HEALTH CARE EDUCATION/TRAINING PROGRAM

## 2023-08-02 RX ORDER — DEXTROAMPHETAMINE SACCHARATE, AMPHETAMINE ASPARTATE, DEXTROAMPHETAMINE SULFATE AND AMPHETAMINE SULFATE 1.25; 1.25; 1.25; 1.25 MG/1; MG/1; MG/1; MG/1
5 TABLET ORAL 2 TIMES DAILY
Qty: 60 TABLET | Refills: 0 | Status: SHIPPED | OUTPATIENT
Start: 2023-08-02 | End: 2023-09-01

## 2023-08-02 SDOH — HEALTH STABILITY: PHYSICAL HEALTH: ON AVERAGE, HOW MANY DAYS PER WEEK DO YOU ENGAGE IN MODERATE TO STRENUOUS EXERCISE (LIKE A BRISK WALK)?: 3 DAYS

## 2023-08-02 SDOH — HEALTH STABILITY: PHYSICAL HEALTH: ON AVERAGE, HOW MANY MINUTES DO YOU ENGAGE IN EXERCISE AT THIS LEVEL?: 30 MIN

## 2023-08-29 DIAGNOSIS — F90.0 ATTENTION DEFICIT HYPERACTIVITY DISORDER (ADHD), PREDOMINANTLY INATTENTIVE TYPE: ICD-10-CM

## 2023-08-31 RX ORDER — DEXTROAMPHETAMINE SACCHARATE, AMPHETAMINE ASPARTATE, DEXTROAMPHETAMINE SULFATE AND AMPHETAMINE SULFATE 1.25; 1.25; 1.25; 1.25 MG/1; MG/1; MG/1; MG/1
5 TABLET ORAL 2 TIMES DAILY
Qty: 60 TABLET | Refills: 0 | OUTPATIENT
Start: 2023-08-31 | End: 2023-09-30

## 2023-08-31 NOTE — TELEPHONE ENCOUNTER
1805: TRANSFER - IN REPORT:    Verbal report received from [address] (name) on Linda Kennedy  being received from [facility] (unit) for routine progression of care      Report consisted of patients Situation, Background, Assessment and   Recommendations(SBAR). Information from the following report(s) SBAR, Kardex, Intake/Output, MAR, and Recent Results was reviewed with the receiving nurse. Opportunity for questions and clarification was provided. Assessment completed upon patients arrival to unit and care assumed. 1901: Bedside and Verbal shift change report given to [facility] RN  (oncoming nurse) by Max Dickerson RN  (offgoing nurse). Report included the following information SBAR, Kardex, Intake/Output, MAR, and Recent Results. Needs appt

## 2023-08-31 NOTE — TELEPHONE ENCOUNTER
Last Office Visit  -  08/02/2023  Next Office Visit  -  n/a    Last Filled  -  08/02/2023  Last UDS -  n/a  Contract -  n/a

## 2023-09-01 ENCOUNTER — OFFICE VISIT (OUTPATIENT)
Dept: FAMILY MEDICINE CLINIC | Age: 33
End: 2023-09-01
Payer: COMMERCIAL

## 2023-09-01 VITALS
WEIGHT: 203.4 LBS | HEIGHT: 69 IN | OXYGEN SATURATION: 98 % | SYSTOLIC BLOOD PRESSURE: 124 MMHG | DIASTOLIC BLOOD PRESSURE: 70 MMHG | HEART RATE: 92 BPM | BODY MASS INDEX: 30.13 KG/M2

## 2023-09-01 DIAGNOSIS — F90.0 ATTENTION DEFICIT HYPERACTIVITY DISORDER (ADHD), PREDOMINANTLY INATTENTIVE TYPE: ICD-10-CM

## 2023-09-01 PROCEDURE — 99213 OFFICE O/P EST LOW 20 MIN: CPT | Performed by: NURSE PRACTITIONER

## 2023-09-01 RX ORDER — DEXTROAMPHETAMINE SACCHARATE, AMPHETAMINE ASPARTATE MONOHYDRATE, DEXTROAMPHETAMINE SULFATE AND AMPHETAMINE SULFATE 2.5; 2.5; 2.5; 2.5 MG/1; MG/1; MG/1; MG/1
10 CAPSULE, EXTENDED RELEASE ORAL DAILY
Qty: 30 CAPSULE | Refills: 0 | Status: SHIPPED | OUTPATIENT
Start: 2023-09-01 | End: 2023-10-01

## 2023-09-01 RX ORDER — DEXTROAMPHETAMINE SACCHARATE, AMPHETAMINE ASPARTATE, DEXTROAMPHETAMINE SULFATE AND AMPHETAMINE SULFATE 1.25; 1.25; 1.25; 1.25 MG/1; MG/1; MG/1; MG/1
5 TABLET ORAL 2 TIMES DAILY
Qty: 60 TABLET | Refills: 0 | Status: CANCELLED | OUTPATIENT
Start: 2023-09-01 | End: 2023-10-01

## 2023-09-01 ASSESSMENT — PATIENT HEALTH QUESTIONNAIRE - PHQ9
7. TROUBLE CONCENTRATING ON THINGS, SUCH AS READING THE NEWSPAPER OR WATCHING TELEVISION: 2
2. FEELING DOWN, DEPRESSED OR HOPELESS: 0
1. LITTLE INTEREST OR PLEASURE IN DOING THINGS: 0
5. POOR APPETITE OR OVEREATING: 0
4. FEELING TIRED OR HAVING LITTLE ENERGY: 1
SUM OF ALL RESPONSES TO PHQ QUESTIONS 1-9: 3
9. THOUGHTS THAT YOU WOULD BE BETTER OFF DEAD, OR OF HURTING YOURSELF: 0
SUM OF ALL RESPONSES TO PHQ9 QUESTIONS 1 & 2: 0
3. TROUBLE FALLING OR STAYING ASLEEP: 0
8. MOVING OR SPEAKING SO SLOWLY THAT OTHER PEOPLE COULD HAVE NOTICED. OR THE OPPOSITE, BEING SO FIGETY OR RESTLESS THAT YOU HAVE BEEN MOVING AROUND A LOT MORE THAN USUAL: 0
SUM OF ALL RESPONSES TO PHQ QUESTIONS 1-9: 3
6. FEELING BAD ABOUT YOURSELF - OR THAT YOU ARE A FAILURE OR HAVE LET YOURSELF OR YOUR FAMILY DOWN: 0

## 2023-09-01 ASSESSMENT — ENCOUNTER SYMPTOMS
RESPIRATORY NEGATIVE: 1
GASTROINTESTINAL NEGATIVE: 1

## 2023-09-01 NOTE — PROGRESS NOTES
Patient: Vaibhav Howard is a 28 y.o. male who presents today with the following Chief Complaint(s):  Chief Complaint   Patient presents with    Medication Check     adderall       Assessment:  Encounter Diagnosis   Name Primary? Attention deficit hyperactivity disorder (ADHD), predominantly inattentive type        Plan:  1. Attention deficit hyperactivity disorder (ADHD), predominantly inattentive type  Will increase adderall to 10 mg XR daily and follow up in 1 month. - amphetamine-dextroamphetamine (ADDERALL XR) 10 MG extended release capsule; Take 1 capsule by mouth daily for 30 days. Max Daily Amount: 10 mg  Dispense: 30 capsule; Refill: 0      HPI  Patient presents today for a follow up ADHD. He was started on adderall 5 mg BID last month. He states it is wearing off after about 3-4 hours. He states he would like to try and extended release form. He denies any side effects from the adderall. He denies any changes in sleep or appetite. He has noticed he is drinking less coffee. Current Outpatient Medications   Medication Sig Dispense Refill    amphetamine-dextroamphetamine (ADDERALL XR) 10 MG extended release capsule Take 1 capsule by mouth daily for 30 days. Max Daily Amount: 10 mg 30 capsule 0    Naproxen Sodium (ALEVE PO) Take by mouth      fexofenadine (ALLEGRA) 180 MG tablet Take by mouth daily      fluticasone (FLONASE) 50 MCG/ACT nasal spray 2 sprays by Each Nostril route daily 16 g 0     No current facility-administered medications for this visit. Patient's past medical history, surgical history, family history, medications,and allergies  were all reviewed and updated as appropriate today. Review of Systems   Constitutional: Negative. HENT: Negative. Respiratory: Negative. Cardiovascular: Negative. Gastrointestinal: Negative. Musculoskeletal: Negative. Skin: Negative. Neurological: Negative. Psychiatric/Behavioral:  Positive for decreased concentration.

## 2023-09-06 DIAGNOSIS — F90.0 ATTENTION DEFICIT HYPERACTIVITY DISORDER (ADHD), PREDOMINANTLY INATTENTIVE TYPE: ICD-10-CM

## 2023-09-06 RX ORDER — ATOMOXETINE 40 MG/1
80 CAPSULE ORAL DAILY
Qty: 180 CAPSULE | Refills: 1 | Status: CANCELLED | OUTPATIENT
Start: 2023-09-06 | End: 2023-12-05

## 2023-09-06 NOTE — TELEPHONE ENCOUNTER
Pt called to confirm meds and pharmacy. He is using the [facility] at [facility] and the med is adderall. He does not take strattera. \"It was a one time thing. \" Pt asked for the [facility] on [address] be deleted from his chart so there will be no further confusion.

## 2023-09-06 NOTE — TELEPHONE ENCOUNTER
[address] [facility] [address] called to request refill on pt strattera. This medication is not on pt active med list. Adderall was just sent to [facility] on 9/1/23. Left message for pt to call back to confirm which medication he is taking and the correct pharmacy. no hemoptysis

## 2023-10-02 ENCOUNTER — OFFICE VISIT (OUTPATIENT)
Dept: FAMILY MEDICINE CLINIC | Age: 33
End: 2023-10-02
Payer: COMMERCIAL

## 2023-10-02 VITALS
OXYGEN SATURATION: 98 % | DIASTOLIC BLOOD PRESSURE: 68 MMHG | BODY MASS INDEX: 30.01 KG/M2 | HEART RATE: 79 BPM | SYSTOLIC BLOOD PRESSURE: 122 MMHG | HEIGHT: 69 IN | WEIGHT: 202.6 LBS

## 2023-10-02 DIAGNOSIS — Z23 NEED FOR VACCINATION: ICD-10-CM

## 2023-10-02 DIAGNOSIS — F90.9 ATTENTION DEFICIT HYPERACTIVITY DISORDER (ADHD), UNSPECIFIED ADHD TYPE: Primary | ICD-10-CM

## 2023-10-02 PROCEDURE — 99213 OFFICE O/P EST LOW 20 MIN: CPT | Performed by: NURSE PRACTITIONER

## 2023-10-02 PROCEDURE — 90471 IMMUNIZATION ADMIN: CPT | Performed by: NURSE PRACTITIONER

## 2023-10-02 PROCEDURE — 90674 CCIIV4 VAC NO PRSV 0.5 ML IM: CPT | Performed by: NURSE PRACTITIONER

## 2023-10-02 RX ORDER — DEXTROAMPHETAMINE SACCHARATE, AMPHETAMINE ASPARTATE MONOHYDRATE, DEXTROAMPHETAMINE SULFATE AND AMPHETAMINE SULFATE 5; 5; 5; 5 MG/1; MG/1; MG/1; MG/1
20 CAPSULE, EXTENDED RELEASE ORAL EVERY MORNING
Qty: 30 CAPSULE | Refills: 0 | Status: SHIPPED | OUTPATIENT
Start: 2023-10-02 | End: 2023-11-01

## 2023-10-02 ASSESSMENT — ENCOUNTER SYMPTOMS
GASTROINTESTINAL NEGATIVE: 1
RESPIRATORY NEGATIVE: 1

## 2023-10-02 NOTE — PROGRESS NOTES
Patient: Jae Ervin is a 28 y.o. male who presents today with the following Chief Complaint(s):  Chief Complaint   Patient presents with    Medication Check       Assessment:  Encounter Diagnoses   Name Primary? Attention deficit hyperactivity disorder (ADHD), unspecified ADHD type Yes    Need for vaccination        Plan:  1. Attention deficit hyperactivity disorder (ADHD), unspecified ADHD type  Will increase adderall to 20 mg XR daily and follow up again in 1 month. - amphetamine-dextroamphetamine (ADDERALL XR) 20 MG extended release capsule; Take 1 capsule by mouth every morning for 30 days. Max Daily Amount: 20 mg  Dispense: 30 capsule; Refill: 0    2. Need for vaccination  - Influenza, FLUCELVAX, (age 10 mo+), IM, Preservative Free, 0.5 mL      HPI  Patient presents today for follow up on ADHD. He states the 10 mg XR is not as effective. It is taking a long time to take effect and then never really reaching a peak. He would like to stick to a once a day and try a higher dosage. He denies any side effects. Current Outpatient Medications   Medication Sig Dispense Refill    amphetamine-dextroamphetamine (ADDERALL XR) 20 MG extended release capsule Take 1 capsule by mouth every morning for 30 days. Max Daily Amount: 20 mg 30 capsule 0    Naproxen Sodium (ALEVE PO) Take by mouth      fexofenadine (ALLEGRA) 180 MG tablet Take by mouth daily      fluticasone (FLONASE) 50 MCG/ACT nasal spray 2 sprays by Each Nostril route daily 16 g 0     No current facility-administered medications for this visit. Patient's past medical history, surgical history, family history, medications,and allergies  were all reviewed and updated as appropriate today. Review of Systems   Constitutional: Negative. HENT: Negative. Respiratory: Negative. Cardiovascular: Negative. Gastrointestinal: Negative. Musculoskeletal: Negative. Skin: Negative. Neurological: Negative.     Psychiatric/Behavioral:

## 2023-10-27 DIAGNOSIS — F90.9 ATTENTION DEFICIT HYPERACTIVITY DISORDER (ADHD), UNSPECIFIED ADHD TYPE: ICD-10-CM

## 2023-10-27 DIAGNOSIS — R06.83 SNORING: ICD-10-CM

## 2023-10-27 RX ORDER — FLUTICASONE PROPIONATE 50 MCG
2 SPRAY, SUSPENSION (ML) NASAL DAILY
Qty: 16 G | Refills: 0 | Status: SHIPPED | OUTPATIENT
Start: 2023-10-27

## 2023-10-27 RX ORDER — DEXTROAMPHETAMINE SACCHARATE, AMPHETAMINE ASPARTATE MONOHYDRATE, DEXTROAMPHETAMINE SULFATE AND AMPHETAMINE SULFATE 5; 5; 5; 5 MG/1; MG/1; MG/1; MG/1
20 CAPSULE, EXTENDED RELEASE ORAL EVERY MORNING
Qty: 30 CAPSULE | Refills: 0 | Status: SHIPPED | OUTPATIENT
Start: 2023-10-27 | End: 2023-11-26

## 2023-10-27 NOTE — TELEPHONE ENCOUNTER
Last Office Visit  -  10/2/23  Next Office Visit  -  n/a    Last Filled  -  10/2/23  Last UDS -  2/7/23  Contract -  needs new

## 2023-10-27 NOTE — TELEPHONE ENCOUNTER
Last Office Visit  -  10/2/23  Next Office Visit  -  n/a    Last Filled  -  11/30/22  Last UDS -    Contract -      *Patient comment: Danyel Briscoes been buying this OTC, but its wildly cheaper on my end as a prescription.

## 2023-11-01 ENCOUNTER — OFFICE VISIT (OUTPATIENT)
Dept: SLEEP MEDICINE | Age: 33
End: 2023-11-01
Payer: COMMERCIAL

## 2023-11-01 ENCOUNTER — TELEPHONE (OUTPATIENT)
Dept: PULMONOLOGY | Age: 33
End: 2023-11-01

## 2023-11-01 VITALS
DIASTOLIC BLOOD PRESSURE: 81 MMHG | BODY MASS INDEX: 29.98 KG/M2 | WEIGHT: 203 LBS | HEART RATE: 84 BPM | TEMPERATURE: 98.3 F | OXYGEN SATURATION: 97 % | SYSTOLIC BLOOD PRESSURE: 114 MMHG

## 2023-11-01 DIAGNOSIS — R53.83 OTHER FATIGUE: ICD-10-CM

## 2023-11-01 DIAGNOSIS — F45.8 BRUXISM (TEETH GRINDING): ICD-10-CM

## 2023-11-01 DIAGNOSIS — R06.83 SNORING: Primary | ICD-10-CM

## 2023-11-01 DIAGNOSIS — R51.9 MORNING HEADACHE: ICD-10-CM

## 2023-11-01 DIAGNOSIS — G47.30 OBSERVED SLEEP APNEA: ICD-10-CM

## 2023-11-01 PROCEDURE — 99204 OFFICE O/P NEW MOD 45 MIN: CPT | Performed by: NURSE PRACTITIONER

## 2023-11-01 ASSESSMENT — SLEEP AND FATIGUE QUESTIONNAIRES
HOW LIKELY ARE YOU TO NOD OFF OR FALL ASLEEP WHILE WATCHING TV: 2
HOW LIKELY ARE YOU TO NOD OFF OR FALL ASLEEP WHILE SITTING AND TALKING TO SOMEONE: 0
HOW LIKELY ARE YOU TO NOD OFF OR FALL ASLEEP WHILE SITTING QUIETLY AFTER LUNCH WITHOUT ALCOHOL: 0
HOW LIKELY ARE YOU TO NOD OFF OR FALL ASLEEP IN A CAR, WHILE STOPPED FOR A FEW MINUTES IN TRAFFIC: 0
ESS TOTAL SCORE: 5
NECK CIRCUMFERENCE (INCHES): 16.5
HOW LIKELY ARE YOU TO NOD OFF OR FALL ASLEEP WHILE LYING DOWN TO REST IN THE AFTERNOON WHEN CIRCUMSTANCES PERMIT: 1
HOW LIKELY ARE YOU TO NOD OFF OR FALL ASLEEP WHEN YOU ARE A PASSENGER IN A CAR FOR AN HOUR WITHOUT A BREAK: 1
HOW LIKELY ARE YOU TO NOD OFF OR FALL ASLEEP WHILE SITTING AND READING: 1
HOW LIKELY ARE YOU TO NOD OFF OR FALL ASLEEP WHILE SITTING INACTIVE IN A PUBLIC PLACE: 0

## 2023-11-01 NOTE — PROGRESS NOTES
alcohol 0   In a car, while stopped for a few minutes in traffic 0   [place] Sleepiness Score 5   Neck circumference (Inches) 16.5       Past Medical History:  Past Medical History:   Diagnosis Date    Anxiety with depression 1/9/2023    Attention deficit hyperactivity disorder (ADHD), predominantly inattentive type 1/9/2023       Past Surgical History:        Procedure Laterality Date    HERNIA REPAIR         Allergies:  is allergic to seasonal.  Social History:    TOBACCO:   reports that he quit smoking about 7 months ago. His smoking use included e-cigarettes. He has never used smokeless tobacco.  ETOH:   reports current alcohol use of about 8.0 standard drinks of alcohol per week. Family History:       Problem Relation Age of Onset    Thyroid Disease Mother         thyroid storm    Elevated Lipids Father     Cancer Father         skin cancer    No Known Problems Sister     No Known Problems Sister     No Known Problems Sister        Current Medications:    Current Outpatient Medications:     amphetamine-dextroamphetamine (ADDERALL XR) 20 MG extended release capsule, Take 1 capsule by mouth every morning for 30 days.  Max Daily Amount: 20 mg, Disp: 30 capsule, Rfl: 0    fluticasone (FLONASE) 50 MCG/ACT nasal spray, 2 sprays by Each Nostril route daily, Disp: 16 g, Rfl: 0    Naproxen Sodium (ALEVE PO), Take by mouth, Disp: , Rfl:     fexofenadine (ALLEGRA) 180 MG tablet, Take by mouth daily, Disp: , Rfl:       REVIEW OF SYSTEMS:  Review of Systems    Constitutional: Negative for fever  HENT: Negative for sore throat  Eyes: Negative for redness   Respiratory: Negative for dyspnea, cough  Cardiovascular: Negative for chest pain  Gastrointestinal: Negative for vomiting, diarrhea   Genitourinary: Negative for hematuria   Musculoskeletal: +arthralgias   Skin: Negative for rash  Neurological: Negative for syncope  Hematological: Negative for adenopathy  Psychiatric/Behavorial: Negative for anxiety      Objective:

## 2023-11-01 NOTE — PATIENT INSTRUCTIONS
Your home sleep test is scheduled to be picked up at the Sleep center located at [id number] [place] [address] up on. Address to Sleep Center: The [facility] at [address] [place] [address], [address] [place] [address], [address] [facility],[address]            Phone: [phone] Fax: [phone]    If you should need to cancel or reschedule your appointment, please call the Sleep Center at [phone] as soon as possible. We ask that you please phone the [address] [place] [address] Patient Pre-Services ([phone]) at least 3-5 days prior to your sleep study to pre-register. Failing to pre-register may ultimately cause your insurance to not pay for this procedure. Please refrain from or reduce the use of caffeine and/or alcohol prior to your sleep study and avoid napping the day of your study as these will affect the accuracy of your test results. Here are some tips to to getting better sleep  1- Avoid napping during the day: This will ensure you are tired at bedtime. If you have to take a nap, sleep less than one hour, before 3 pm.   2- Exercise regularly, but not right before bed: but the timing of the workout is important. Exercising in the morning or early afternoon will not interfere with sleep. Exercising within two hours before bedtime can decrease your ability to fall asleep. Regular exercise is recommended to help you deepen the sleep. 3- Avoid heavy, spicy, or sugary foods 4-6 hours before bedtime: These can affect your ability to stay asleep. 4- Have a light snack before bed: Having an empty stomach can interfere with your sleep. Dairy products and turkey contain tryptophan, which acts as a natural sleep inducer. 5- Stay away from caffeine, nicotine and alcohol at least 4-6 hours before bed: Caffeine and nicotine are stimulants that interfere with your ability to fall asleep.  While alcohol has an immediate sleep-inducing effect, a few hours later, as alcohol levels in your blood start to fall, there is a

## 2023-11-02 ENCOUNTER — TELEPHONE (OUTPATIENT)
Dept: SLEEP MEDICINE | Age: 33
End: 2023-11-02

## 2023-11-09 NOTE — TELEPHONE ENCOUNTER
HST scheduled 11/15/2023.   Please schedule patient follow-up appointment after testing to discuss results

## 2023-11-15 ENCOUNTER — HOSPITAL ENCOUNTER (OUTPATIENT)
Dept: SLEEP CENTER | Age: 33
Discharge: HOME | End: 2023-11-17
Payer: COMMERCIAL

## 2023-11-15 DIAGNOSIS — R06.83 SNORING: ICD-10-CM

## 2023-11-15 DIAGNOSIS — G47.30 OBSERVED SLEEP APNEA: ICD-10-CM

## 2023-11-15 DIAGNOSIS — R53.83 OTHER FATIGUE: ICD-10-CM

## 2023-11-15 PROCEDURE — 95806 SLEEP STUDY UNATT&RESP EFFT: CPT

## 2023-11-16 PROBLEM — R06.83 SNORING: Status: ACTIVE | Noted: 2023-11-16

## 2023-11-16 PROBLEM — G47.30 OBSERVED SLEEP APNEA: Status: ACTIVE | Noted: 2023-11-16

## 2023-11-16 PROBLEM — R53.83 OTHER FATIGUE: Status: ACTIVE | Noted: 2023-11-16

## 2023-11-28 ENCOUNTER — TELEMEDICINE (OUTPATIENT)
Dept: SLEEP MEDICINE | Age: 33
End: 2023-11-28
Payer: COMMERCIAL

## 2023-11-28 DIAGNOSIS — R06.83 SNORING: ICD-10-CM

## 2023-11-28 DIAGNOSIS — F90.9 ATTENTION DEFICIT HYPERACTIVITY DISORDER (ADHD), UNSPECIFIED ADHD TYPE: ICD-10-CM

## 2023-11-28 DIAGNOSIS — Z71.89 CPAP USE COUNSELING: ICD-10-CM

## 2023-11-28 DIAGNOSIS — G47.33 MODERATE OBSTRUCTIVE SLEEP APNEA: Primary | ICD-10-CM

## 2023-11-28 DIAGNOSIS — R53.83 OTHER FATIGUE: ICD-10-CM

## 2023-11-28 PROCEDURE — 99213 OFFICE O/P EST LOW 20 MIN: CPT | Performed by: NURSE PRACTITIONER

## 2023-11-28 RX ORDER — DEXTROAMPHETAMINE SACCHARATE, AMPHETAMINE ASPARTATE MONOHYDRATE, DEXTROAMPHETAMINE SULFATE AND AMPHETAMINE SULFATE 5; 5; 5; 5 MG/1; MG/1; MG/1; MG/1
20 CAPSULE, EXTENDED RELEASE ORAL EVERY MORNING
Qty: 30 CAPSULE | Refills: 0 | Status: SHIPPED | OUTPATIENT
Start: 2023-11-28 | End: 2023-11-28 | Stop reason: SDUPTHER

## 2023-11-28 RX ORDER — DEXTROAMPHETAMINE SACCHARATE, AMPHETAMINE ASPARTATE MONOHYDRATE, DEXTROAMPHETAMINE SULFATE AND AMPHETAMINE SULFATE 5; 5; 5; 5 MG/1; MG/1; MG/1; MG/1
20 CAPSULE, EXTENDED RELEASE ORAL EVERY MORNING
Qty: 30 CAPSULE | Refills: 0 | Status: SHIPPED | OUTPATIENT
Start: 2023-11-28 | End: 2023-12-28

## 2023-11-28 RX ORDER — M-VIT,TX,IRON,MINS/CALC/FOLIC 27MG-0.4MG
1 TABLET ORAL DAILY
COMMUNITY

## 2023-11-28 ASSESSMENT — SLEEP AND FATIGUE QUESTIONNAIRES
HOW LIKELY ARE YOU TO NOD OFF OR FALL ASLEEP WHILE SITTING AND READING: 0
HOW LIKELY ARE YOU TO NOD OFF OR FALL ASLEEP WHILE SITTING QUIETLY AFTER LUNCH WITHOUT ALCOHOL: 0
HOW LIKELY ARE YOU TO NOD OFF OR FALL ASLEEP WHILE WATCHING TV: 0
HOW LIKELY ARE YOU TO NOD OFF OR FALL ASLEEP WHILE SITTING AND TALKING TO SOMEONE: 0
HOW LIKELY ARE YOU TO NOD OFF OR FALL ASLEEP WHEN YOU ARE A PASSENGER IN A CAR FOR AN HOUR WITHOUT A BREAK: 0
HOW LIKELY ARE YOU TO NOD OFF OR FALL ASLEEP WHILE LYING DOWN TO REST IN THE AFTERNOON WHEN CIRCUMSTANCES PERMIT: 1
ESS TOTAL SCORE: 1
HOW LIKELY ARE YOU TO NOD OFF OR FALL ASLEEP IN A CAR, WHILE STOPPED FOR A FEW MINUTES IN TRAFFIC: 0
HOW LIKELY ARE YOU TO NOD OFF OR FALL ASLEEP WHILE SITTING INACTIVE IN A PUBLIC PLACE: 0

## 2023-11-28 NOTE — TELEPHONE ENCOUNTER
Patient called back stating the pharmacy is out of this medication, and is requesting it to be sent to different pharmacy that does have it in stock.    Medication pended to [facility] pharmacy on [address]

## 2023-11-28 NOTE — TELEPHONE ENCOUNTER
Last Office Visit  -  10/2/23  Next Office Visit  -  n/a    Last Filled  -  10/27/23  Last UDS -  2/7/23  Contract -  n/a

## 2023-11-28 NOTE — PROGRESS NOTES
Patient ID: Amarjit Bran is a 28 y.o. male who is being seen today for   Chief Complaint   Patient presents with    Results     HST     Referring: Dr. Josie Boas Stump    HPI:     Amarjit Bran is a 28 y.o. male for televideo appointment via video and audio virtual visit for ALIRIO follow up. HST was  reviewed by me and noted below. It showed moderate ALIRIO. Results were dicussed with patient and multiple good questions were answered    Some daytime sleepiness. No nodding off when driving. Some fatigue. Bedtime is 1130 pm-1230 am and rise time is 730-830 am. Sleep onset is few minutes. Wakes up 1-2 times at night total. 0-1 nocturia. It takes usually few minutes to fall back a sleep. No naps during the day. No headache in am. No weight gain. 1-2 caffienated beverages during the day. 1-2 beer/night. ESS is 1        Initial HPI 11/1/23  Amarjit Bran is a 28 y.o. male in office for snoring evaluation. Patient reports snoring at night for the past 2-3+ years. Wakes self snoring. Has witnessed apnea. Mostly restorative sleep. +dry mouth upon awakening. Minimal fatigue and tiredness during the day. No history of sleep apnea. Bedtime 1130 pm- 1230 am and rise time is 7-830 am. It takes <30 minutes to fall asleep. 0-1 nocturia. Wakes up 0-1 times at night. It takes few minutes to fall back a sleep. Takes no nap during the day. Occasional headache in am. No car wrecks or near wrecks because of the sleepiness. No nodding off while driving. Gained 30 pounds in the past 3-5 years. No forgetfulness some decreased concentration. States recently started adderal for ADHD. Drinks 2 caffinated beverages per day. +1-2 beer a night. No restless feelings in legs at night. No loss of muscle strength when angry or laugh. No hallucination when dozing off or waking up from sleep. No paralysis upon awakening from sleep or going to sleep. +teeth grinding. No nightmares. No sleep walking. No night time panic attacks. No narcotics.

## 2023-12-12 ENCOUNTER — TELEMEDICINE (OUTPATIENT)
Dept: FAMILY MEDICINE CLINIC | Age: 33
End: 2023-12-12
Payer: COMMERCIAL

## 2023-12-12 DIAGNOSIS — J06.9 VIRAL URI: Primary | ICD-10-CM

## 2023-12-12 LAB
INFLUENZA A ANTIGEN, POC: NEGATIVE
INFLUENZA B ANTIGEN, POC: NEGATIVE
LOT EXPIRE DATE: NORMAL
LOT KIT NUMBER: NORMAL
SARS-COV-2, POC: NORMAL
VALID INTERNAL CONTROL: NORMAL
VENDOR AND KIT NAME POC: NORMAL

## 2023-12-12 PROCEDURE — 87428 SARSCOV & INF VIR A&B AG IA: CPT | Performed by: NURSE PRACTITIONER

## 2023-12-12 PROCEDURE — 99213 OFFICE O/P EST LOW 20 MIN: CPT | Performed by: NURSE PRACTITIONER

## 2023-12-12 ASSESSMENT — ENCOUNTER SYMPTOMS
GASTROINTESTINAL NEGATIVE: 1
COUGH: 1
SHORTNESS OF BREATH: 1
SORE THROAT: 1
WHEEZING: 0

## 2023-12-12 NOTE — PROGRESS NOTES
[] Abnormal -    Neck: [x] No visualized mass [] Abnormal -     Pulmonary/Chest: [x] Respiratory effort normal   [x] No visualized signs of difficulty breathing or respiratory distress        [] Abnormal -      Musculoskeletal:   [x] Normal gait with no signs of ataxia         [x] Normal range of motion of neck        [] Abnormal -     Neurological:        [x] No Facial Asymmetry (Cranial nerve 7 motor function) (limited exam due to video visit)          [x] No gaze palsy        [] Abnormal -          Skin:        [x] No significant exanthematous lesions or discoloration noted on facial skin         [] Abnormal -            Psychiatric:       [x] Normal Affect [] Abnormal -        [x] No Hallucinations    Other pertinent observable physical exam findings:-             Jeronimo Holley, was evaluated through a synchronous (real-time) audio-video encounter. The patient (or guardian if applicable) is aware that this is a billable service, which includes applicable co-pays. This Virtual Visit was conducted with patient's (and/or legal guardian's) consent. Patient identification was verified, and a caregiver was present when appropriate.    The patient was located at [facility] ([address] [place] [address]): [address]  Provider was located at [facility] ([address] [place], [place] [address]t): [address]         --Ceasar Standard, APRN - CNP

## 2023-12-30 DIAGNOSIS — F90.9 ATTENTION DEFICIT HYPERACTIVITY DISORDER (ADHD), UNSPECIFIED ADHD TYPE: ICD-10-CM

## 2024-01-02 RX ORDER — DEXTROAMPHETAMINE SACCHARATE, AMPHETAMINE ASPARTATE MONOHYDRATE, DEXTROAMPHETAMINE SULFATE AND AMPHETAMINE SULFATE 5; 5; 5; 5 MG/1; MG/1; MG/1; MG/1
20 CAPSULE, EXTENDED RELEASE ORAL EVERY MORNING
Qty: 30 CAPSULE | Refills: 0 | Status: SHIPPED | OUTPATIENT
Start: 2024-01-02 | End: 2024-02-01

## 2024-01-02 NOTE — TELEPHONE ENCOUNTER
Last Office Visit  -  12/12/2023  Next Office Visit  -  n/a    Last Filled  -  11/28/2023  Last UDS -  n/a  Contract -  n/a

## 2024-01-29 DIAGNOSIS — F90.9 ATTENTION DEFICIT HYPERACTIVITY DISORDER (ADHD), UNSPECIFIED ADHD TYPE: ICD-10-CM

## 2024-01-29 RX ORDER — DEXTROAMPHETAMINE SACCHARATE, AMPHETAMINE ASPARTATE MONOHYDRATE, DEXTROAMPHETAMINE SULFATE AND AMPHETAMINE SULFATE 5; 5; 5; 5 MG/1; MG/1; MG/1; MG/1
20 CAPSULE, EXTENDED RELEASE ORAL EVERY MORNING
Qty: 30 CAPSULE | Refills: 0 | Status: SHIPPED | OUTPATIENT
Start: 2024-01-29 | End: 2024-01-30 | Stop reason: SDUPTHER

## 2024-01-29 NOTE — TELEPHONE ENCOUNTER
Last Office Visit  -  10/2/23  Next Office Visit  -      Last Filled  -  1/2/24  Last UDS -    Contract -

## 2024-01-30 ENCOUNTER — OFFICE VISIT (OUTPATIENT)
Dept: FAMILY MEDICINE CLINIC | Age: 34
End: 2024-01-30
Payer: COMMERCIAL

## 2024-01-30 VITALS
SYSTOLIC BLOOD PRESSURE: 124 MMHG | HEIGHT: 69 IN | BODY MASS INDEX: 29.8 KG/M2 | WEIGHT: 201.2 LBS | DIASTOLIC BLOOD PRESSURE: 76 MMHG

## 2024-01-30 DIAGNOSIS — F90.9 ATTENTION DEFICIT HYPERACTIVITY DISORDER (ADHD), UNSPECIFIED ADHD TYPE: ICD-10-CM

## 2024-01-30 PROCEDURE — 99213 OFFICE O/P EST LOW 20 MIN: CPT | Performed by: STUDENT IN AN ORGANIZED HEALTH CARE EDUCATION/TRAINING PROGRAM

## 2024-01-30 RX ORDER — DEXTROAMPHETAMINE SACCHARATE, AMPHETAMINE ASPARTATE, DEXTROAMPHETAMINE SULFATE AND AMPHETAMINE SULFATE 1.25; 1.25; 1.25; 1.25 MG/1; MG/1; MG/1; MG/1
5 TABLET ORAL
Qty: 30 TABLET | Refills: 0 | Status: SHIPPED | OUTPATIENT
Start: 2024-01-30 | End: 2024-02-29

## 2024-01-30 RX ORDER — DEXTROAMPHETAMINE SACCHARATE, AMPHETAMINE ASPARTATE MONOHYDRATE, DEXTROAMPHETAMINE SULFATE AND AMPHETAMINE SULFATE 5; 5; 5; 5 MG/1; MG/1; MG/1; MG/1
20 CAPSULE, EXTENDED RELEASE ORAL EVERY MORNING
Qty: 30 CAPSULE | Refills: 0 | Status: SHIPPED | OUTPATIENT
Start: 2024-01-30 | End: 2024-02-29

## 2024-01-30 ASSESSMENT — PATIENT HEALTH QUESTIONNAIRE - PHQ9
5. POOR APPETITE OR OVEREATING: 1
SUM OF ALL RESPONSES TO PHQ9 QUESTIONS 1 & 2: 0
1. LITTLE INTEREST OR PLEASURE IN DOING THINGS: NOT AT ALL
9. THOUGHTS THAT YOU WOULD BE BETTER OFF DEAD, OR OF HURTING YOURSELF: 0
6. FEELING BAD ABOUT YOURSELF - OR THAT YOU ARE A FAILURE OR HAVE LET YOURSELF OR YOUR FAMILY DOWN: 0
7. TROUBLE CONCENTRATING ON THINGS, SUCH AS READING THE NEWSPAPER OR WATCHING TELEVISION: 0
3. TROUBLE FALLING OR STAYING ASLEEP: 1
8. MOVING OR SPEAKING SO SLOWLY THAT OTHER PEOPLE COULD HAVE NOTICED. OR THE OPPOSITE, BEING SO FIGETY OR RESTLESS THAT YOU HAVE BEEN MOVING AROUND A LOT MORE THAN USUAL: 0
SUM OF ALL RESPONSES TO PHQ QUESTIONS 1-9: 3
4. FEELING TIRED OR HAVING LITTLE ENERGY: SEVERAL DAYS
8. MOVING OR SPEAKING SO SLOWLY THAT OTHER PEOPLE COULD HAVE NOTICED. OR THE OPPOSITE - BEING SO FIDGETY OR RESTLESS THAT YOU HAVE BEEN MOVING AROUND A LOT MORE THAN USUAL: NOT AT ALL
SUM OF ALL RESPONSES TO PHQ QUESTIONS 1-9: 3
5. POOR APPETITE OR OVEREATING: SEVERAL DAYS
2. FEELING DOWN, DEPRESSED OR HOPELESS: 0
3. TROUBLE FALLING OR STAYING ASLEEP: SEVERAL DAYS
10. IF YOU CHECKED OFF ANY PROBLEMS, HOW DIFFICULT HAVE THESE PROBLEMS MADE IT FOR YOU TO DO YOUR WORK, TAKE CARE OF THINGS AT HOME, OR GET ALONG WITH OTHER PEOPLE: 1
SUM OF ALL RESPONSES TO PHQ QUESTIONS 1-9: 3
10. IF YOU CHECKED OFF ANY PROBLEMS, HOW DIFFICULT HAVE THESE PROBLEMS MADE IT FOR YOU TO DO YOUR WORK, TAKE CARE OF THINGS AT HOME, OR GET ALONG WITH OTHER PEOPLE: SOMEWHAT DIFFICULT
1. LITTLE INTEREST OR PLEASURE IN DOING THINGS: 0
4. FEELING TIRED OR HAVING LITTLE ENERGY: 1
2. FEELING DOWN, DEPRESSED OR HOPELESS: NOT AT ALL
7. TROUBLE CONCENTRATING ON THINGS, SUCH AS READING THE NEWSPAPER OR WATCHING TELEVISION: NOT AT ALL
9. THOUGHTS THAT YOU WOULD BE BETTER OFF DEAD, OR OF HURTING YOURSELF: NOT AT ALL
6. FEELING BAD ABOUT YOURSELF - OR THAT YOU ARE A FAILURE OR HAVE LET YOURSELF OR YOUR FAMILY DOWN: NOT AT ALL

## 2024-01-30 NOTE — PROGRESS NOTES
Del Regan (: 1990) is a 33 y.o. male is here for evaluation of the following chief complaint(s): Follow-up, Medication Check, and Medication Refill    Assessment/Plan:   1. Attention deficit hyperactivity disorder (ADHD), unspecified ADHD type  -     amphetamine-dextroamphetamine (ADDERALL XR) 20 MG extended release capsule; Take 1 capsule by mouth every morning for 30 days. Max Daily Amount: 20 mg, Disp-30 capsule, R-0Normal  -     amphetamine-dextroamphetamine (ADDERALL, 5MG,) 5 MG tablet; Take 1 tablet by mouth Daily with lunch for 30 days. Max Daily Amount: 5 mg, Disp-30 tablet, R-0Normal  Chronic.  Partially controlled.  Does notice that the medication is wearing off at 3 to 4 PM on most days.  Will continue 20 mg extended release Adderall dose, will also add a 5 mg immediate release dose for the patient be taken in the afternoon.  Patient will monitor response and follow-up as needed for the above concern.  90-day follow-up.    No follow-ups on file.  Subjective/Objective:   Since last visit: Patient notices that it loses its effect around 3-4 pm on intermittent days.  Medication compliance: all of the time.  Side effects from medication include: None. Denies hypertension, dizziness, anorexia, weight loss, palpitations, and insomnia.   has been reviewed.        /76   Ht 1.753 m (5' 9\")   Wt 91.3 kg (201 lb 3.2 oz)   BMI 29.71 kg/m²     On this date 2024 I have spent 20 minutes reviewing previous notes, test results and face to face with the patient discussing the diagnosis and importance of compliance with the treatment plan as well as documenting on the day of the visit.  An electronic signature was used to authenticate this note.  -- Agusto Zuniga MD

## 2024-02-27 DIAGNOSIS — F90.9 ATTENTION DEFICIT HYPERACTIVITY DISORDER (ADHD), UNSPECIFIED ADHD TYPE: ICD-10-CM

## 2024-02-27 RX ORDER — DEXTROAMPHETAMINE SACCHARATE, AMPHETAMINE ASPARTATE MONOHYDRATE, DEXTROAMPHETAMINE SULFATE AND AMPHETAMINE SULFATE 5; 5; 5; 5 MG/1; MG/1; MG/1; MG/1
20 CAPSULE, EXTENDED RELEASE ORAL EVERY MORNING
Qty: 30 CAPSULE | Refills: 0 | Status: SHIPPED | OUTPATIENT
Start: 2024-02-27 | End: 2024-03-28

## 2024-02-27 RX ORDER — DEXTROAMPHETAMINE SACCHARATE, AMPHETAMINE ASPARTATE, DEXTROAMPHETAMINE SULFATE AND AMPHETAMINE SULFATE 1.25; 1.25; 1.25; 1.25 MG/1; MG/1; MG/1; MG/1
5 TABLET ORAL
Qty: 30 TABLET | Refills: 0 | Status: SHIPPED | OUTPATIENT
Start: 2024-02-27 | End: 2024-03-28

## 2024-02-27 NOTE — TELEPHONE ENCOUNTER
Last Office Visit  -  1/30/24  Next Office Visit  -  n/a    Last Filled  -  1/30/24  Last UDS -  2/7/23  Contract -  n/a

## 2024-03-26 ENCOUNTER — TELEPHONE (OUTPATIENT)
Dept: PULMONOLOGY | Age: 34
End: 2024-03-26

## 2024-03-26 ENCOUNTER — TELEMEDICINE (OUTPATIENT)
Dept: SLEEP MEDICINE | Age: 34
End: 2024-03-26
Payer: COMMERCIAL

## 2024-03-26 DIAGNOSIS — Z71.89 CPAP USE COUNSELING: ICD-10-CM

## 2024-03-26 DIAGNOSIS — G47.31 CSA (CENTRAL SLEEP APNEA): ICD-10-CM

## 2024-03-26 DIAGNOSIS — Z78.9 ALCOHOL USE: ICD-10-CM

## 2024-03-26 DIAGNOSIS — G47.33 MODERATE OBSTRUCTIVE SLEEP APNEA: Primary | ICD-10-CM

## 2024-03-26 PROCEDURE — 99214 OFFICE O/P EST MOD 30 MIN: CPT | Performed by: NURSE PRACTITIONER

## 2024-03-26 ASSESSMENT — SLEEP AND FATIGUE QUESTIONNAIRES
HOW LIKELY ARE YOU TO NOD OFF OR FALL ASLEEP WHILE SITTING AND TALKING TO SOMEONE: WOULD NEVER DOZE
HOW LIKELY ARE YOU TO NOD OFF OR FALL ASLEEP WHILE SITTING INACTIVE IN A PUBLIC PLACE: WOULD NEVER DOZE
HOW LIKELY ARE YOU TO NOD OFF OR FALL ASLEEP WHILE WATCHING TV: SLIGHT CHANCE OF DOZING
ESS TOTAL SCORE: 3
HOW LIKELY ARE YOU TO NOD OFF OR FALL ASLEEP WHEN YOU ARE A PASSENGER IN A CAR FOR AN HOUR WITHOUT A BREAK: WOULD NEVER DOZE
HOW LIKELY ARE YOU TO NOD OFF OR FALL ASLEEP WHILE SITTING AND READING: SLIGHT CHANCE OF DOZING
HOW LIKELY ARE YOU TO NOD OFF OR FALL ASLEEP WHILE LYING DOWN TO REST IN THE AFTERNOON WHEN CIRCUMSTANCES PERMIT: SLIGHT CHANCE OF DOZING
HOW LIKELY ARE YOU TO NOD OFF OR FALL ASLEEP IN A CAR, WHILE STOPPED FOR A FEW MINUTES IN TRAFFIC: WOULD NEVER DOZE
HOW LIKELY ARE YOU TO NOD OFF OR FALL ASLEEP WHILE SITTING QUIETLY AFTER LUNCH WITHOUT ALCOHOL: WOULD NEVER DOZE

## 2024-03-26 NOTE — TELEPHONE ENCOUNTER
Faxed pressure change order, CR, and ov notes to [facility] at [phone] via RightFax.

## 2024-03-26 NOTE — PROGRESS NOTES
Patient ID: Del Regan is a 33 y.o. male who is being seen today for   Chief Complaint   Patient presents with    Follow-up     Sleep, no concerns  [facility]      Referring: Dr. Agusto Zuniga    HPI:       Del Regan is a 33 y.o. male for televideo appointment via video and audio virtual visit for ALIRIO follow up.  He started auto CPAP after last visit.  States CPAP is helping, states he is trying to get used to using CPAP consistently.  States he feels better, less tired if using CPAP enough.        Patient is using CPAP   4-6 hrs/night. Using humidifier. No snoring on CPAP. The pressure is well tolerated. The mask is comfortable- full face. +mask leak. States he might want to try partial full face mask.   No significant daytime sleepiness. No nodding off when driving. No dry nose or throat. No fatigue. Bedtime is MN-1 am and rise time is 7 am. Sleep onset is few -30 minutes. Wakes up 1 times at night total. 1 nocturia. It takes few minutes to fall back a sleep. No naps during the day. No headache in am. No weight gain. 2 caffienated beverages during the day. 1-2 beer a night. ESS is 3          3/26/2024     1:00 PM 11/28/2023     8:31 AM 11/1/2023     3:35 PM   Sleep Medicine   Sitting and reading 1 0 1   Watching TV 1 0 2   Sitting, inactive in a public place (e.g. a theatre or a meeting) 0 0 0   As a passenger in a car for an hour without a break 0 0 1   Lying down to rest in the afternoon when circumstances permit 1 1 1   Sitting and talking to someone 0 0 0   Sitting quietly after a lunch without alcohol 0 0 0   In a car, while stopped for a few minutes in traffic 0 0 0   [place] Sleepiness Score 3 1 5   Neck circumference (Inches)   16.5       Past Medical History:  Past Medical History:   Diagnosis Date    Anxiety with depression 1/9/2023    Attention deficit hyperactivity disorder (ADHD), predominantly inattentive type 1/9/2023       Past Surgical History:        Procedure Laterality

## 2024-03-28 DIAGNOSIS — F90.9 ATTENTION DEFICIT HYPERACTIVITY DISORDER (ADHD), UNSPECIFIED ADHD TYPE: ICD-10-CM

## 2024-03-28 RX ORDER — DEXTROAMPHETAMINE SACCHARATE, AMPHETAMINE ASPARTATE MONOHYDRATE, DEXTROAMPHETAMINE SULFATE AND AMPHETAMINE SULFATE 5; 5; 5; 5 MG/1; MG/1; MG/1; MG/1
20 CAPSULE, EXTENDED RELEASE ORAL EVERY MORNING
Qty: 30 CAPSULE | Refills: 0 | Status: CANCELLED | OUTPATIENT
Start: 2024-03-28 | End: 2024-04-27

## 2024-03-28 RX ORDER — DEXTROAMPHETAMINE SACCHARATE, AMPHETAMINE ASPARTATE, DEXTROAMPHETAMINE SULFATE AND AMPHETAMINE SULFATE 1.25; 1.25; 1.25; 1.25 MG/1; MG/1; MG/1; MG/1
5 TABLET ORAL
Qty: 30 TABLET | Refills: 0 | Status: SHIPPED | OUTPATIENT
Start: 2024-03-28 | End: 2024-04-27

## 2024-03-28 RX ORDER — DEXTROAMPHETAMINE SACCHARATE, AMPHETAMINE ASPARTATE MONOHYDRATE, DEXTROAMPHETAMINE SULFATE AND AMPHETAMINE SULFATE 5; 5; 5; 5 MG/1; MG/1; MG/1; MG/1
20 CAPSULE, EXTENDED RELEASE ORAL EVERY MORNING
Qty: 30 CAPSULE | Refills: 0 | Status: SHIPPED | OUTPATIENT
Start: 2024-03-28 | End: 2024-04-27

## 2024-03-28 RX ORDER — DEXTROAMPHETAMINE SACCHARATE, AMPHETAMINE ASPARTATE, DEXTROAMPHETAMINE SULFATE AND AMPHETAMINE SULFATE 1.25; 1.25; 1.25; 1.25 MG/1; MG/1; MG/1; MG/1
5 TABLET ORAL
Qty: 30 TABLET | Refills: 0 | Status: CANCELLED | OUTPATIENT
Start: 2024-03-28 | End: 2024-04-27

## 2024-03-28 NOTE — TELEPHONE ENCOUNTER
Last Office Visit  -  1/30/24  Next Office Visit  -  n/a    Last Filled  -  2/27/24  Last UDS -  2/7/23  Contract -  n/a

## 2024-04-28 DIAGNOSIS — F90.9 ATTENTION DEFICIT HYPERACTIVITY DISORDER (ADHD), UNSPECIFIED ADHD TYPE: ICD-10-CM

## 2024-04-28 RX ORDER — DEXTROAMPHETAMINE SACCHARATE, AMPHETAMINE ASPARTATE MONOHYDRATE, DEXTROAMPHETAMINE SULFATE AND AMPHETAMINE SULFATE 5; 5; 5; 5 MG/1; MG/1; MG/1; MG/1
20 CAPSULE, EXTENDED RELEASE ORAL EVERY MORNING
Qty: 30 CAPSULE | Refills: 0 | Status: CANCELLED | OUTPATIENT
Start: 2024-04-28 | End: 2024-05-28

## 2024-04-28 RX ORDER — DEXTROAMPHETAMINE SACCHARATE, AMPHETAMINE ASPARTATE, DEXTROAMPHETAMINE SULFATE AND AMPHETAMINE SULFATE 1.25; 1.25; 1.25; 1.25 MG/1; MG/1; MG/1; MG/1
5 TABLET ORAL
Qty: 30 TABLET | Refills: 0 | Status: CANCELLED | OUTPATIENT
Start: 2024-04-28 | End: 2024-05-28

## 2024-04-29 ENCOUNTER — TELEMEDICINE (OUTPATIENT)
Dept: FAMILY MEDICINE CLINIC | Age: 34
End: 2024-04-29
Payer: COMMERCIAL

## 2024-04-29 DIAGNOSIS — F90.9 ATTENTION DEFICIT HYPERACTIVITY DISORDER (ADHD), UNSPECIFIED ADHD TYPE: ICD-10-CM

## 2024-04-29 PROCEDURE — 99213 OFFICE O/P EST LOW 20 MIN: CPT | Performed by: NURSE PRACTITIONER

## 2024-04-29 RX ORDER — DEXTROAMPHETAMINE SACCHARATE, AMPHETAMINE ASPARTATE MONOHYDRATE, DEXTROAMPHETAMINE SULFATE AND AMPHETAMINE SULFATE 5; 5; 5; 5 MG/1; MG/1; MG/1; MG/1
20 CAPSULE, EXTENDED RELEASE ORAL EVERY MORNING
Qty: 30 CAPSULE | Refills: 0 | Status: SHIPPED | OUTPATIENT
Start: 2024-04-29 | End: 2024-05-29

## 2024-04-29 RX ORDER — DEXTROAMPHETAMINE SACCHARATE, AMPHETAMINE ASPARTATE, DEXTROAMPHETAMINE SULFATE AND AMPHETAMINE SULFATE 1.25; 1.25; 1.25; 1.25 MG/1; MG/1; MG/1; MG/1
5 TABLET ORAL
Qty: 30 TABLET | Refills: 0 | Status: SHIPPED | OUTPATIENT
Start: 2024-04-29 | End: 2024-05-29

## 2024-04-29 NOTE — TELEPHONE ENCOUNTER
Last Office Visit  -  1/30/24  Next Office Visit  -  4/29/24    Last Filled  -    Last UDS -    Contract -      Pt has a VV scheduled this morning for med check on this medication.

## 2024-04-29 NOTE — PROGRESS NOTES
Del Regan (:  1990) is a Established patient, presenting virtually for evaluation of the following:    Assessment & Plan   Below is the assessment and plan developed based on review of pertinent history, physical exam, labs, studies, and medications.  1. Attention deficit hyperactivity disorder (ADHD), unspecified ADHD type  -     amphetamine-dextroamphetamine (ADDERALL XR) 20 MG extended release capsule; Take 1 capsule by mouth every morning for 30 days. Max Daily Amount: 20 mg, Disp-30 capsule, R-0Normal  -     amphetamine-dextroamphetamine (ADDERALL, 5MG,) 5 MG tablet; Take 1 tablet by mouth Daily with lunch for 30 days. Max Daily Amount: 5 mg, Disp-30 tablet, R-0Normal    No follow-ups on file.       Subjective   HPI  Med check  Adderall 20mg XR in the morning, adderall 5mg in the afternoon  Sometimes takes 1/2 afternoon dose if it gets too late in the day to take whole dose   No trouble sleeping or eating due to medication unless he takes afternoon dose too late then can't sleep well.     Review of Systems   Constitutional:  Negative for activity change, appetite change, chills, fever and unexpected weight change.   Psychiatric/Behavioral:  Negative for decreased concentration (meds working well).           Objective   Patient-Reported Vitals  No data recorded     Physical Exam  Constitutional:       Appearance: Normal appearance.   Eyes:      Conjunctiva/sclera: Conjunctivae normal.   Pulmonary:      Effort: Pulmonary effort is normal.   Neurological:      General: No focal deficit present.      Mental Status: He is alert and oriented to person, place, and time.   Psychiatric:         Mood and Affect: Mood normal.         Behavior: Behavior normal.         Thought Content: Thought content normal.         Judgment: Judgment normal.                    Del Regan, was evaluated through a synchronous (real-time) audio-video encounter. The patient (or guardian if applicable) is aware that this

## 2024-05-07 ENCOUNTER — TELEMEDICINE (OUTPATIENT)
Dept: SLEEP MEDICINE | Age: 34
End: 2024-05-07
Payer: COMMERCIAL

## 2024-05-07 DIAGNOSIS — G47.33 MODERATE OBSTRUCTIVE SLEEP APNEA: Primary | ICD-10-CM

## 2024-05-07 DIAGNOSIS — Z71.89 CPAP USE COUNSELING: ICD-10-CM

## 2024-05-07 PROCEDURE — 99213 OFFICE O/P EST LOW 20 MIN: CPT | Performed by: NURSE PRACTITIONER

## 2024-05-07 RX ORDER — CLINDAMYCIN PHOSPHATE 11.9 MG/ML
SOLUTION TOPICAL 2 TIMES DAILY
COMMUNITY

## 2024-05-07 RX ORDER — KETOCONAZOLE 20 MG/ML
SHAMPOO TOPICAL
COMMUNITY
Start: 2024-05-02

## 2024-05-07 ASSESSMENT — SLEEP AND FATIGUE QUESTIONNAIRES
HOW LIKELY ARE YOU TO NOD OFF OR FALL ASLEEP WHILE SITTING QUIETLY AFTER LUNCH WITHOUT ALCOHOL: WOULD NEVER DOZE
ESS TOTAL SCORE: 3
HOW LIKELY ARE YOU TO NOD OFF OR FALL ASLEEP IN A CAR, WHILE STOPPED FOR A FEW MINUTES IN TRAFFIC: WOULD NEVER DOZE
HOW LIKELY ARE YOU TO NOD OFF OR FALL ASLEEP WHILE WATCHING TV: SLIGHT CHANCE OF DOZING
HOW LIKELY ARE YOU TO NOD OFF OR FALL ASLEEP WHILE LYING DOWN TO REST IN THE AFTERNOON WHEN CIRCUMSTANCES PERMIT: SLIGHT CHANCE OF DOZING
HOW LIKELY ARE YOU TO NOD OFF OR FALL ASLEEP WHILE SITTING INACTIVE IN A PUBLIC PLACE: WOULD NEVER DOZE
HOW LIKELY ARE YOU TO NOD OFF OR FALL ASLEEP WHILE SITTING AND TALKING TO SOMEONE: WOULD NEVER DOZE
HOW LIKELY ARE YOU TO NOD OFF OR FALL ASLEEP WHILE SITTING AND READING: SLIGHT CHANCE OF DOZING
HOW LIKELY ARE YOU TO NOD OFF OR FALL ASLEEP WHEN YOU ARE A PASSENGER IN A CAR FOR AN HOUR WITHOUT A BREAK: WOULD NEVER DOZE

## 2024-05-07 NOTE — PROGRESS NOTES
normocephalic and atraumatic. Normal appearing nose. External Ears normal.   Neck: No visualized mass. Trachea is midline   Eyes: EOM intact. No visible discharge.   Resp:No visualized signs of difficulty breathing or respiratory distress, speaking in full sentences.  Respiratory effort normal.  Neuro: Awake. Alert.  Able to follow commands.  No facial asymmetry.  Skin: No significant lesions or discoloration noted on facial skin    Musculoskeletal: Normal range of motion of the neck.  Psych: Oriented x 3. No anxiety. Normal affect.        DATA:   1/9/2023 vitamin D 39  1/9/2023 Hgb 14.3  11/16/2023 HST AHI 17.4, low SpO2 89%    CPAP download data:  Compliance download report from 2/25/24 to 3/25/24 reviewed today by me and showed patient is using machine 5:18 hrs/night with 63% compliance and AHI 8.7 within this time frame.  19/30days with greater than 4 hours of machine use.  90% pressure 9.4 cm H20 on auto CPAP 8-16 cm H2O    Compliance download report from 4/3/24 to 5/2/24 reviewed today by me and showed patient is using machine 5:30 hrs/night with 77% compliance and AHI 3.3 within this time frame.  23/30days with greater than 4 hours of machine use.  90% pressure 8.5 cm H20 on auto CPAP 7-11 cm H2O     Assessment:       Moderate ALIRIO.  Auto CPAP 7-11 cm H2O Optimal compliance and efficacy on review today.     Snoring-resolved on CPAP  Observed sleep apnea -resolved on CPAP  Fatigue-improving  Bruxism  Morning headache-improved  Alcohol use        Plan:      Continue auto CPAP 7-11 cm H2O  Interpreted and reviewed CPAP download data with patient  Discussed CPAP acclamation techniques  Patient can call DME to try different mask if he would like  Advised to use CPAP 6-8 hrs at night and during naps-continue to increase use.  Replacement of mask, tubing, head straps every 3-6 months or sooner if damaged.   Patient instructed to contact DME company for any mask, tubing or machine trouble shooting if problems

## 2024-05-28 DIAGNOSIS — F90.9 ATTENTION DEFICIT HYPERACTIVITY DISORDER (ADHD), UNSPECIFIED ADHD TYPE: ICD-10-CM

## 2024-05-28 RX ORDER — DEXTROAMPHETAMINE SACCHARATE, AMPHETAMINE ASPARTATE MONOHYDRATE, DEXTROAMPHETAMINE SULFATE AND AMPHETAMINE SULFATE 5; 5; 5; 5 MG/1; MG/1; MG/1; MG/1
20 CAPSULE, EXTENDED RELEASE ORAL EVERY MORNING
Qty: 30 CAPSULE | Refills: 0 | Status: SHIPPED | OUTPATIENT
Start: 2024-05-28 | End: 2024-06-27

## 2024-05-28 RX ORDER — DEXTROAMPHETAMINE SACCHARATE, AMPHETAMINE ASPARTATE, DEXTROAMPHETAMINE SULFATE AND AMPHETAMINE SULFATE 1.25; 1.25; 1.25; 1.25 MG/1; MG/1; MG/1; MG/1
5 TABLET ORAL
Qty: 30 TABLET | Refills: 0 | Status: SHIPPED | OUTPATIENT
Start: 2024-05-28 | End: 2024-06-27

## 2024-05-28 NOTE — TELEPHONE ENCOUNTER
Last Office Visit  -  04/29/2024  Next Office Visit  -  n/a    Last Filled  -  04/29/2024  Last UDS -    Contract -

## 2024-06-28 DIAGNOSIS — F90.9 ATTENTION DEFICIT HYPERACTIVITY DISORDER (ADHD), UNSPECIFIED ADHD TYPE: ICD-10-CM

## 2024-06-28 RX ORDER — DEXTROAMPHETAMINE SACCHARATE, AMPHETAMINE ASPARTATE MONOHYDRATE, DEXTROAMPHETAMINE SULFATE AND AMPHETAMINE SULFATE 5; 5; 5; 5 MG/1; MG/1; MG/1; MG/1
20 CAPSULE, EXTENDED RELEASE ORAL EVERY MORNING
Qty: 30 CAPSULE | Refills: 0 | Status: SHIPPED | OUTPATIENT
Start: 2024-06-28 | End: 2024-07-28

## 2024-06-28 RX ORDER — DEXTROAMPHETAMINE SACCHARATE, AMPHETAMINE ASPARTATE, DEXTROAMPHETAMINE SULFATE AND AMPHETAMINE SULFATE 1.25; 1.25; 1.25; 1.25 MG/1; MG/1; MG/1; MG/1
5 TABLET ORAL
Qty: 30 TABLET | Refills: 0 | Status: SHIPPED | OUTPATIENT
Start: 2024-06-28 | End: 2024-07-28

## 2024-06-28 NOTE — TELEPHONE ENCOUNTER
Last Office Visit  -  04/29/2024  Next Office Visit  -  n/a    Last Filled  -  05/28/2024  Last UDS -    Contract -

## 2024-07-29 ENCOUNTER — TELEMEDICINE (OUTPATIENT)
Dept: FAMILY MEDICINE CLINIC | Age: 34
End: 2024-07-29
Payer: COMMERCIAL

## 2024-07-29 DIAGNOSIS — F41.9 ANXIETY: ICD-10-CM

## 2024-07-29 DIAGNOSIS — F90.9 ATTENTION DEFICIT HYPERACTIVITY DISORDER (ADHD), UNSPECIFIED ADHD TYPE: Primary | ICD-10-CM

## 2024-07-29 DIAGNOSIS — F90.9 ATTENTION DEFICIT HYPERACTIVITY DISORDER (ADHD), UNSPECIFIED ADHD TYPE: ICD-10-CM

## 2024-07-29 PROCEDURE — 99213 OFFICE O/P EST LOW 20 MIN: CPT | Performed by: NURSE PRACTITIONER

## 2024-07-29 RX ORDER — DEXTROAMPHETAMINE SACCHARATE, AMPHETAMINE ASPARTATE MONOHYDRATE, DEXTROAMPHETAMINE SULFATE AND AMPHETAMINE SULFATE 5; 5; 5; 5 MG/1; MG/1; MG/1; MG/1
20 CAPSULE, EXTENDED RELEASE ORAL EVERY MORNING
Qty: 30 CAPSULE | Refills: 0 | Status: SHIPPED | OUTPATIENT
Start: 2024-07-29 | End: 2024-08-28

## 2024-07-29 RX ORDER — DEXTROAMPHETAMINE SACCHARATE, AMPHETAMINE ASPARTATE MONOHYDRATE, DEXTROAMPHETAMINE SULFATE AND AMPHETAMINE SULFATE 5; 5; 5; 5 MG/1; MG/1; MG/1; MG/1
20 CAPSULE, EXTENDED RELEASE ORAL EVERY MORNING
Qty: 30 CAPSULE | Refills: 0 | Status: CANCELLED | OUTPATIENT
Start: 2024-07-29 | End: 2024-08-28

## 2024-07-29 RX ORDER — DEXTROAMPHETAMINE SACCHARATE, AMPHETAMINE ASPARTATE, DEXTROAMPHETAMINE SULFATE AND AMPHETAMINE SULFATE 1.25; 1.25; 1.25; 1.25 MG/1; MG/1; MG/1; MG/1
5 TABLET ORAL
Qty: 30 TABLET | Refills: 0 | Status: CANCELLED | OUTPATIENT
Start: 2024-07-29 | End: 2024-08-28

## 2024-07-29 RX ORDER — DEXTROAMPHETAMINE SACCHARATE, AMPHETAMINE ASPARTATE, DEXTROAMPHETAMINE SULFATE AND AMPHETAMINE SULFATE 1.25; 1.25; 1.25; 1.25 MG/1; MG/1; MG/1; MG/1
5 TABLET ORAL
Qty: 30 TABLET | Refills: 0 | Status: SHIPPED | OUTPATIENT
Start: 2024-07-29 | End: 2024-08-28

## 2024-07-29 SDOH — ECONOMIC STABILITY: FOOD INSECURITY: WITHIN THE PAST 12 MONTHS, YOU WORRIED THAT YOUR FOOD WOULD RUN OUT BEFORE YOU GOT MONEY TO BUY MORE.: NEVER TRUE

## 2024-07-29 SDOH — ECONOMIC STABILITY: INCOME INSECURITY: HOW HARD IS IT FOR YOU TO PAY FOR THE VERY BASICS LIKE FOOD, HOUSING, MEDICAL CARE, AND HEATING?: SOMEWHAT HARD

## 2024-07-29 SDOH — ECONOMIC STABILITY: FOOD INSECURITY: WITHIN THE PAST 12 MONTHS, THE FOOD YOU BOUGHT JUST DIDN'T LAST AND YOU DIDN'T HAVE MONEY TO GET MORE.: NEVER TRUE

## 2024-07-29 NOTE — PROGRESS NOTES
audio-video encounter. The patient (or guardian if applicable) is aware that this is a billable service, which includes applicable co-pays. This Virtual Visit was conducted with patient's (and/or legal guardian's) consent. Patient identification was verified, and a caregiver was present when appropriate.   The patient was located at Home: [id number]  [place] [address]  Provider was located at Facility (Appt Dept): [address] [place] [address]  Confirm you are appropriately licensed, registered, or certified to deliver care in the [facility] where the patient is located as indicated above. If you are not or unsure, please re-schedule the visit: Yes, I confirm.        --Sil aClvo, RASHIDA - CNP

## 2024-08-05 ENCOUNTER — OFFICE VISIT (OUTPATIENT)
Dept: PSYCHOLOGY | Age: 34
End: 2024-08-05
Payer: COMMERCIAL

## 2024-08-05 DIAGNOSIS — F41.8 ANXIETY WITH DEPRESSION: Primary | ICD-10-CM

## 2024-08-05 DIAGNOSIS — F90.0 ADHD (ATTENTION DEFICIT HYPERACTIVITY DISORDER), INATTENTIVE TYPE: ICD-10-CM

## 2024-08-05 PROCEDURE — 90791 PSYCH DIAGNOSTIC EVALUATION: CPT | Performed by: PSYCHOLOGIST

## 2024-08-05 ASSESSMENT — ANXIETY QUESTIONNAIRES
5. BEING SO RESTLESS THAT IT IS HARD TO SIT STILL: 2-OVER HALF THE DAYS
6. BECOMING EASILY ANNOYED OR IRRITABLE: 2-OVER HALF THE DAYS
3. WORRYING TOO MUCH ABOUT DIFFERENT THINGS: 2-OVER HALF THE DAYS
1. FEELING NERVOUS, ANXIOUS, OR ON EDGE: NEARLY EVERY DAY
7. FEELING AFRAID AS IF SOMETHING AWFUL MIGHT HAPPEN: 0-NOT AT ALL
4. TROUBLE RELAXING: 2-OVER HALF THE DAYS
2. NOT BEING ABLE TO STOP OR CONTROL WORRYING: 2-OVER HALF THE DAYS
GAD7 TOTAL SCORE: 13

## 2024-08-05 ASSESSMENT — PATIENT HEALTH QUESTIONNAIRE - PHQ9
SUM OF ALL RESPONSES TO PHQ QUESTIONS 1-9: 2
2. FEELING DOWN, DEPRESSED OR HOPELESS: SEVERAL DAYS
SUM OF ALL RESPONSES TO PHQ QUESTIONS 1-9: 2
1. LITTLE INTEREST OR PLEASURE IN DOING THINGS: SEVERAL DAYS
SUM OF ALL RESPONSES TO PHQ9 QUESTIONS 1 & 2: 2
SUM OF ALL RESPONSES TO PHQ QUESTIONS 1-9: 2
SUM OF ALL RESPONSES TO PHQ QUESTIONS 1-9: 2

## 2024-08-05 NOTE — PATIENT INSTRUCTIONS
Review handouts about anxiety.    The Physiology of Anxiety    When you sense danger, your brain activates your autonomic nervous system.  The two branches of your autonomic nervous system, the sympathetic and the parasympathetic, control your body's energy level in order to prepare you for action.  The sympathetic nervous system controls your fight or flight response and releases energy to prepare you for action.  The parasympathetic nervous system is your body’s relaxation/recovery system:  it returns your body to a normal state when the danger is over.    The sympathetic nervous system is an all-or-none system.  That means that when it’s activated it quickly turns on all of its component parts (which is a great way for an emergency response system to operate):    Rapid Heart Rate, Rapid Breathing:  The alarm reaction increases the heart rate and breathing rate so that we are alert and our muscles are ready for action.  These changes also help insure that the muscles and brain will have enough oxygen and energy for defense.  At the same time, blood flow to the skin decreases, which prevents us from losing as much blood if we are wounded.    Sweating:  Sweating helps to cool the body during exertion, making it more efficient.  “Cold sweat” is what some people feel when sweating occurs at the same time that blood flow to the skin decreases.    Tight Chest, Tingling, Numbness, Hot Flushes, Trembling:  Hyperventilation occurs when we breathe rapidly but do not expend the energy with muscle action, like revving a car while holding down the brake.  This can lead to feelings of tingling and numbness, hot flushes, and increased sweating.  When rapid chest breathing and muscle tension occur at the same time, people feel chest pain, breathlessness, and choking.      Upset Stomach, Diarrhea:  Digestion isn’t needed during times of danger, and the sympathetic nervous system shuts it down, leading to dry mouth and an upset

## 2024-08-05 NOTE — PROGRESS NOTES
important for managing overall health, and Collaboratively set goals with pt re: treatment.    Pt Behavioral Change Plan:   See Pt Instructions

## 2024-08-06 ENCOUNTER — OFFICE VISIT (OUTPATIENT)
Dept: FAMILY MEDICINE CLINIC | Age: 34
End: 2024-08-06
Payer: COMMERCIAL

## 2024-08-06 VITALS
BODY MASS INDEX: 29.3 KG/M2 | SYSTOLIC BLOOD PRESSURE: 130 MMHG | DIASTOLIC BLOOD PRESSURE: 64 MMHG | OXYGEN SATURATION: 98 % | WEIGHT: 197.8 LBS | HEIGHT: 69 IN | HEART RATE: 104 BPM

## 2024-08-06 DIAGNOSIS — L60.0 INGROWN TOENAIL: Primary | ICD-10-CM

## 2024-08-06 DIAGNOSIS — L03.031 PARONYCHIA OF GREAT TOE OF RIGHT FOOT: ICD-10-CM

## 2024-08-06 PROCEDURE — 99213 OFFICE O/P EST LOW 20 MIN: CPT | Performed by: STUDENT IN AN ORGANIZED HEALTH CARE EDUCATION/TRAINING PROGRAM

## 2024-08-06 NOTE — PROGRESS NOTES
Del Regan (:  1990) is a 34 y.o. male,Established patient, here for evaluation of the following chief complaint(s):  Follow-up, Toe Pain, ADHD, and Anxiety      Assessment & Plan   1. Ingrown toenail  -     NARINDER - Jhon Cox DPM, Podiatry, [place]  2. Paronychia of great toe of right foot  -     Jhon Novak DPM, Podiatry, [place]  Chronic.  Paronychia is likely related to ingrown toenail being persistent.  I do feel it is best that the patient see a podiatrist for intervention at this time.    No follow-ups on file.       Subjective   HPI  Patient is a 34-year-old male, who presents to clinic to discuss multiple areas.    Patient reports that his stools are improved and that he is no longer having the anal symptoms.  He states that he never got the ova and parasite testing, believes that he may not need them at this time.    Patient reports that he never saw the podiatrist after having the problems with the ingrown toenail.  However he has had repeated concerns with paronychia.  He is states that multiple times over the past year he has had infections adjacent to his toenail, and is unsure if it may be related to an ingrown toenail.    Patient had seen the dermatologist and was diagnosed with BCC on his face had a Mohs procedure and is doing well now.    Review of Systems   All other systems reviewed and are negative.         Objective   Vitals:    24 1011   BP: 130/64   Pulse: (!) 104   SpO2: 98%   Weight: 89.7 kg (197 lb 12.8 oz)   Height: 1.753 m (5' 9\")       Physical Exam  Vitals reviewed.   Constitutional:       General: He is not in acute distress.     Appearance: Normal appearance. He is not ill-appearing, toxic-appearing or diaphoretic.   HENT:      Mouth/Throat:      Mouth: Mucous membranes are moist.   Eyes:      General: No scleral icterus.     Conjunctiva/sclera: Conjunctivae normal.   Cardiovascular:      Rate and Rhythm: Normal rate.   Pulmonary:

## 2024-08-19 ENCOUNTER — OFFICE VISIT (OUTPATIENT)
Dept: PSYCHOLOGY | Age: 34
End: 2024-08-19
Payer: COMMERCIAL

## 2024-08-19 DIAGNOSIS — F41.8 ANXIETY WITH DEPRESSION: Primary | ICD-10-CM

## 2024-08-19 DIAGNOSIS — F90.0 ADHD (ATTENTION DEFICIT HYPERACTIVITY DISORDER), INATTENTIVE TYPE: ICD-10-CM

## 2024-08-19 PROCEDURE — 90832 PSYTX W PT 30 MINUTES: CPT | Performed by: PSYCHOLOGIST

## 2024-08-19 ASSESSMENT — ANXIETY QUESTIONNAIRES
6. BECOMING EASILY ANNOYED OR IRRITABLE: 3-NEARLY EVERY DAY
3. WORRYING TOO MUCH ABOUT DIFFERENT THINGS: 3-NEARLY EVERY DAY
1. FEELING NERVOUS, ANXIOUS, OR ON EDGE: MORE THAN HALF THE DAYS
5. BEING SO RESTLESS THAT IT IS HARD TO SIT STILL: 1-SEVERAL DAYS
2. NOT BEING ABLE TO STOP OR CONTROL WORRYING: 3-NEARLY EVERY DAY
7. FEELING AFRAID AS IF SOMETHING AWFUL MIGHT HAPPEN: 1-SEVERAL DAYS
4. TROUBLE RELAXING: 2-OVER HALF THE DAYS
GAD7 TOTAL SCORE: 15

## 2024-08-19 ASSESSMENT — PATIENT HEALTH QUESTIONNAIRE - PHQ9
2. FEELING DOWN, DEPRESSED OR HOPELESS: SEVERAL DAYS
SUM OF ALL RESPONSES TO PHQ QUESTIONS 1-9: 2
1. LITTLE INTEREST OR PLEASURE IN DOING THINGS: SEVERAL DAYS
SUM OF ALL RESPONSES TO PHQ9 QUESTIONS 1 & 2: 2
SUM OF ALL RESPONSES TO PHQ QUESTIONS 1-9: 2

## 2024-08-19 NOTE — PATIENT INSTRUCTIONS
Consider adopting a mindfulness practice. You could search the internet for recordings of a practice called the \"body scan,\" or go for a short walk and pay attention to all of the details in your surrounding. You can also find more information about mindfulness online.   Set Boundaries, Find Peace, by Estrella Martell  Return to see Dr. Summers in 2 weeks.    MINDFULNESS    “Mindfulness means paying attention in a particular way: on purpose, in the present moment, and non-judgmentally.”   -Andrea Luna    \"Mindfulness is the basic human ability to be fully present, aware of where we are and what we’re doing, and not overly reactive or overwhelmed by what’s going on around us.\"   -Mindful Heath    Paying attention “on purpose”  Mindfulness involves paying attention “on purpose”. Mindfulness involves a conscious direction of our awareness. This can mean purposefully directing our attention to the breath, or a particular emotion, or an activity as simple as eating. Doing so allows us to actively shape the mind.    Paying attention “in the present moment”  Left to itself, the mind wanders through all kinds of thoughts -- including thoughts expressing anger, craving, depression, self-pity, and anxiety. As we indulge in these kinds of thoughts, we reinforce those emotions and cause ourselves to suffer. These thoughts usually center around the past or future. But the past no longer exists. The future is just a fantasy until it happens. The one moment we actually can experience -- the present moment -- is the one we seem most to avoid. By purposefully directing our awareness away from thoughts about the past or future and instead towards the “anchor” - our present moment experience - we decrease the effect of these thoughts on our lives.    Paying attention “non-judgmentally”  Mindfulness is an emotionally non-reactive state. We don't  that this experience is good and that one is bad. Or, if we do make those judgments,

## 2024-08-25 DIAGNOSIS — F90.9 ATTENTION DEFICIT HYPERACTIVITY DISORDER (ADHD), UNSPECIFIED ADHD TYPE: ICD-10-CM

## 2024-08-26 RX ORDER — DEXTROAMPHETAMINE SACCHARATE, AMPHETAMINE ASPARTATE, DEXTROAMPHETAMINE SULFATE AND AMPHETAMINE SULFATE 1.25; 1.25; 1.25; 1.25 MG/1; MG/1; MG/1; MG/1
5 TABLET ORAL
Qty: 30 TABLET | Refills: 0 | Status: SHIPPED | OUTPATIENT
Start: 2024-08-26 | End: 2024-09-25

## 2024-08-26 RX ORDER — DEXTROAMPHETAMINE SACCHARATE, AMPHETAMINE ASPARTATE MONOHYDRATE, DEXTROAMPHETAMINE SULFATE AND AMPHETAMINE SULFATE 5; 5; 5; 5 MG/1; MG/1; MG/1; MG/1
20 CAPSULE, EXTENDED RELEASE ORAL EVERY MORNING
Qty: 30 CAPSULE | Refills: 0 | Status: SHIPPED | OUTPATIENT
Start: 2024-08-26 | End: 2024-09-25

## 2024-08-26 NOTE — TELEPHONE ENCOUNTER
Last Office Visit  -  8/6/24  Next Office Visit  -  n/a    Last Filled  -  7/29/24  Last UDS -  2/7/23  Contract -  2/7/23

## 2024-09-09 ENCOUNTER — TELEMEDICINE (OUTPATIENT)
Dept: PSYCHOLOGY | Age: 34
End: 2024-09-09
Payer: COMMERCIAL

## 2024-09-09 DIAGNOSIS — F41.8 ANXIETY WITH DEPRESSION: Primary | ICD-10-CM

## 2024-09-09 DIAGNOSIS — F90.0 ADHD (ATTENTION DEFICIT HYPERACTIVITY DISORDER), INATTENTIVE TYPE: ICD-10-CM

## 2024-09-09 PROCEDURE — 90832 PSYTX W PT 30 MINUTES: CPT | Performed by: PSYCHOLOGIST

## 2024-10-03 DIAGNOSIS — F90.9 ATTENTION DEFICIT HYPERACTIVITY DISORDER (ADHD), UNSPECIFIED ADHD TYPE: ICD-10-CM

## 2024-10-03 RX ORDER — DEXTROAMPHETAMINE SACCHARATE, AMPHETAMINE ASPARTATE, DEXTROAMPHETAMINE SULFATE AND AMPHETAMINE SULFATE 1.25; 1.25; 1.25; 1.25 MG/1; MG/1; MG/1; MG/1
5 TABLET ORAL
Qty: 30 TABLET | Refills: 0 | Status: SHIPPED | OUTPATIENT
Start: 2024-10-03 | End: 2024-11-02

## 2024-10-03 RX ORDER — DEXTROAMPHETAMINE SACCHARATE, AMPHETAMINE ASPARTATE MONOHYDRATE, DEXTROAMPHETAMINE SULFATE AND AMPHETAMINE SULFATE 5; 5; 5; 5 MG/1; MG/1; MG/1; MG/1
20 CAPSULE, EXTENDED RELEASE ORAL EVERY MORNING
Qty: 30 CAPSULE | Refills: 0 | Status: SHIPPED | OUTPATIENT
Start: 2024-10-03 | End: 2024-11-02

## 2024-10-03 NOTE — TELEPHONE ENCOUNTER
Last Office Visit  -  8/6/24  Next Office Visit  -  n/a    Last Filled  -  8/26/24  Last UDS -  2/7/23  Contract -  n/a

## 2024-10-16 ENCOUNTER — OFFICE VISIT (OUTPATIENT)
Dept: FAMILY MEDICINE CLINIC | Age: 34
End: 2024-10-16
Payer: COMMERCIAL

## 2024-10-16 VITALS
BODY MASS INDEX: 29.56 KG/M2 | HEART RATE: 87 BPM | SYSTOLIC BLOOD PRESSURE: 110 MMHG | OXYGEN SATURATION: 98 % | HEIGHT: 69 IN | WEIGHT: 199.6 LBS | DIASTOLIC BLOOD PRESSURE: 60 MMHG

## 2024-10-16 DIAGNOSIS — F41.9 ANXIETY: Primary | ICD-10-CM

## 2024-10-16 DIAGNOSIS — L29.9 ITCHING: ICD-10-CM

## 2024-10-16 PROCEDURE — 99213 OFFICE O/P EST LOW 20 MIN: CPT | Performed by: STUDENT IN AN ORGANIZED HEALTH CARE EDUCATION/TRAINING PROGRAM

## 2024-10-16 RX ORDER — HYDROXYZINE HYDROCHLORIDE 25 MG/1
12.5 TABLET, FILM COATED ORAL EVERY 8 HOURS PRN
Qty: 45 TABLET | Refills: 2 | Status: SHIPPED | OUTPATIENT
Start: 2024-10-16 | End: 2025-01-14

## 2024-10-16 NOTE — PROGRESS NOTES
Del Regan (:  1990) is a 34 y.o. male,Established patient, here for evaluation of the following chief complaint(s):  Other (Significantly worsening scalp itching/scratching/scabbing//)         Assessment & Plan  Anxiety    Patient has increased anxiety.  Will start hydroxyzine.  Follow-up as needed for this concern.    Orders:    hydrOXYzine HCl (ATARAX) 25 MG tablet; Take 0.5 tablets by mouth every 8 hours as needed for Itching or Anxiety    Itching    Hydroxyzine which patient can take for anxiety may help with the itching as well.  Advise continued cold tar shampoo, can use steroids for up to 2 weeks at a time.  Patient should follow-up with dermatologist for the itching.    Orders:    hydrOXYzine HCl (ATARAX) 25 MG tablet; Take 0.5 tablets by mouth every 8 hours as needed for Itching or Anxiety      No follow-ups on file.       Subjective   HPI  Patient is a 34-year-old male, who presents to clinic for interval follow-up for scalp itching.  Patient states that he has having persistent scalp itching and when he scratches the area little scabs well-formed.  He states that he has had repeated scratching of the same area and there has been a small amount of skin to that has formed where the chronic inflammation from scratching has been.  Patient has seen a dermatologist and has been started on a steroid that he used for his scalp.  He reports he was post to use it for 2 weeks, but then used it for 2 months.  He has not used at all for the last 6 weeks or so.  Patient has also noticed some hair falling out when he utilizes ketoconazole shampoo or coal tar shampoo.  Patient does notice some improvement with Scalpicin as well.  Patient is not sure if he is able to start his steroid even if it is to use it for a short period of time leading up to his wedding before he can get back to see the dermatologist.    Patient also has problems with chronic anxiety, slightly worsened after starting the Adderall

## 2024-11-01 DIAGNOSIS — F90.9 ATTENTION DEFICIT HYPERACTIVITY DISORDER (ADHD), UNSPECIFIED ADHD TYPE: ICD-10-CM

## 2024-11-01 DIAGNOSIS — F41.9 ANXIETY: ICD-10-CM

## 2024-11-01 DIAGNOSIS — L29.9 ITCHING: ICD-10-CM

## 2024-11-01 RX ORDER — HYDROXYZINE HYDROCHLORIDE 25 MG/1
12.5 TABLET, FILM COATED ORAL EVERY 8 HOURS PRN
Qty: 45 TABLET | Refills: 2 | Status: SHIPPED | OUTPATIENT
Start: 2024-11-01 | End: 2025-01-30

## 2024-11-01 RX ORDER — DEXTROAMPHETAMINE SACCHARATE, AMPHETAMINE ASPARTATE, DEXTROAMPHETAMINE SULFATE AND AMPHETAMINE SULFATE 1.25; 1.25; 1.25; 1.25 MG/1; MG/1; MG/1; MG/1
5 TABLET ORAL
Qty: 30 TABLET | Refills: 0 | Status: SHIPPED | OUTPATIENT
Start: 2024-11-01 | End: 2024-12-01

## 2024-11-01 RX ORDER — DEXTROAMPHETAMINE SACCHARATE, AMPHETAMINE ASPARTATE MONOHYDRATE, DEXTROAMPHETAMINE SULFATE AND AMPHETAMINE SULFATE 5; 5; 5; 5 MG/1; MG/1; MG/1; MG/1
20 CAPSULE, EXTENDED RELEASE ORAL EVERY MORNING
Qty: 30 CAPSULE | Refills: 0 | Status: SHIPPED | OUTPATIENT
Start: 2024-11-01 | End: 2024-12-01

## 2024-11-01 NOTE — TELEPHONE ENCOUNTER
Last Office Visit  -  10/16/24  Next Office Visit  -  n/a    Last Filled  -  10/16/24  Last UDS -    Contract -

## 2024-11-01 NOTE — TELEPHONE ENCOUNTER
Last Office Visit  -  10/16/24  Next Office Visit  -  n/a    Last Filled  -  10/3/24  Last UDS -  2/7/23  Contract -  n/a

## 2024-11-05 ENCOUNTER — TELEMEDICINE (OUTPATIENT)
Dept: SLEEP MEDICINE | Age: 34
End: 2024-11-05
Payer: COMMERCIAL

## 2024-11-05 ENCOUNTER — TELEPHONE (OUTPATIENT)
Dept: PULMONOLOGY | Age: 34
End: 2024-11-05

## 2024-11-05 DIAGNOSIS — Z78.9 DIFFICULTY USING CONTINUOUS POSITIVE AIRWAY PRESSURE (CPAP) DEVICE: ICD-10-CM

## 2024-11-05 DIAGNOSIS — Z72.821 POOR SLEEP HYGIENE: ICD-10-CM

## 2024-11-05 DIAGNOSIS — R53.83 OTHER FATIGUE: ICD-10-CM

## 2024-11-05 DIAGNOSIS — G47.33 MODERATE OBSTRUCTIVE SLEEP APNEA: Primary | ICD-10-CM

## 2024-11-05 DIAGNOSIS — Z71.89 CPAP USE COUNSELING: ICD-10-CM

## 2024-11-05 PROCEDURE — 99213 OFFICE O/P EST LOW 20 MIN: CPT | Performed by: NURSE PRACTITIONER

## 2024-11-05 ASSESSMENT — SLEEP AND FATIGUE QUESTIONNAIRES
HOW LIKELY ARE YOU TO NOD OFF OR FALL ASLEEP WHILE SITTING INACTIVE IN A PUBLIC PLACE: WOULD NEVER DOZE
HOW LIKELY ARE YOU TO NOD OFF OR FALL ASLEEP WHILE LYING DOWN TO REST IN THE AFTERNOON WHEN CIRCUMSTANCES PERMIT: SLIGHT CHANCE OF DOZING
HOW LIKELY ARE YOU TO NOD OFF OR FALL ASLEEP WHILE WATCHING TV: SLIGHT CHANCE OF DOZING
HOW LIKELY ARE YOU TO NOD OFF OR FALL ASLEEP WHILE SITTING AND READING: WOULD NEVER DOZE
HOW LIKELY ARE YOU TO NOD OFF OR FALL ASLEEP WHEN YOU ARE A PASSENGER IN A CAR FOR AN HOUR WITHOUT A BREAK: WOULD NEVER DOZE
HOW LIKELY ARE YOU TO NOD OFF OR FALL ASLEEP IN A CAR, WHILE STOPPED FOR A FEW MINUTES IN TRAFFIC: WOULD NEVER DOZE
HOW LIKELY ARE YOU TO NOD OFF OR FALL ASLEEP WHILE SITTING QUIETLY AFTER LUNCH WITHOUT ALCOHOL: WOULD NEVER DOZE
HOW LIKELY ARE YOU TO NOD OFF OR FALL ASLEEP WHILE SITTING AND TALKING TO SOMEONE: WOULD NEVER DOZE
ESS TOTAL SCORE: 2

## 2024-11-05 NOTE — PROGRESS NOTES
H2O    Compliance download report from 4/3/24 to 5/2/24 reviewed today by me and showed patient is using machine 5:30 hrs/night with 77% compliance and AHI 3.3 within this time frame.  23/30days with greater than 4 hours of machine use.  90% pressure 8.5 cm H20 on auto CPAP 7-11 cm H2O     Compliance download report from 10/6/24 to 11/4/24 reviewed today by me and showed patient is using machine 5:51 hrs/night with 60% compliance and AHI 3.2 within this time frame.  18/30days with greater than 4 hours of machine use.  90% pressure 9 cm H20 on auto CPAP 7-11 cm H2O      Assessment:       Moderate ALIRIO.  Auto CPAP 7-11 cm H2O, near compliance, patient benefiting from CPAP  Snoring-resolved on CPAP  Observed sleep apnea -resolved on CPAP  Fatigue-improving  Bruxism  Morning headache-improved  Alcohol use        Plan:      Continue auto CPAP 7-11 cm H2O  Interpreted and reviewed CPAP download data with patient  Discussed CPAP acclamation techniques  Order heated tubing  Patient can call DME to try different mask if he would like  Advised to use CPAP 6-8 hrs at night and during naps-continue to increase use.  Replacement of mask, tubing, head straps every 3-6 months or sooner if damaged.   Patient instructed to contact DME company for any mask, tubing or machine trouble shooting if problems arise.  Sleep hygiene  Avoidsedatives, alcohol and caffeinated drinks at bed time.  No driving motorized vehicles or operating heavy machinery while fatigue, drowsy or sleepy.   Weight loss is also recommended as a long-term intervention.    Complications of ALIRIO if not treated were discussed with patient patient to include systemic hypertension, pulmonary hypertension, cardiovascular morbidities, car accidents and all cause mortality.  Discussed pathophysiology of ALIRIO with patient   Patient education provided regarding sleep tips     Follow-up 3 months, sooner if needed    Del Regan, was evaluated through a synchronous

## 2024-12-03 DIAGNOSIS — L29.9 ITCHING: ICD-10-CM

## 2024-12-03 DIAGNOSIS — F41.9 ANXIETY: ICD-10-CM

## 2024-12-03 DIAGNOSIS — F90.9 ATTENTION DEFICIT HYPERACTIVITY DISORDER (ADHD), UNSPECIFIED ADHD TYPE: ICD-10-CM

## 2024-12-03 RX ORDER — DEXTROAMPHETAMINE SACCHARATE, AMPHETAMINE ASPARTATE, DEXTROAMPHETAMINE SULFATE AND AMPHETAMINE SULFATE 1.25; 1.25; 1.25; 1.25 MG/1; MG/1; MG/1; MG/1
5 TABLET ORAL
Qty: 30 TABLET | Refills: 0 | Status: SHIPPED | OUTPATIENT
Start: 2024-12-03 | End: 2025-01-02

## 2024-12-03 RX ORDER — DEXTROAMPHETAMINE SACCHARATE, AMPHETAMINE ASPARTATE MONOHYDRATE, DEXTROAMPHETAMINE SULFATE AND AMPHETAMINE SULFATE 5; 5; 5; 5 MG/1; MG/1; MG/1; MG/1
20 CAPSULE, EXTENDED RELEASE ORAL EVERY MORNING
Qty: 30 CAPSULE | Refills: 0 | Status: SHIPPED | OUTPATIENT
Start: 2024-12-03 | End: 2025-01-02

## 2024-12-03 RX ORDER — HYDROXYZINE HYDROCHLORIDE 25 MG/1
12.5 TABLET, FILM COATED ORAL EVERY 8 HOURS PRN
Qty: 45 TABLET | Refills: 2 | Status: SHIPPED | OUTPATIENT
Start: 2024-12-03 | End: 2025-03-03

## 2024-12-03 NOTE — TELEPHONE ENCOUNTER
Last Office Visit  -  10/16/24  Next Office Visit  -  n/a    Last Filled  -  11/1/24  Last UDS -  2/7/23  Contract -  n/a

## 2025-01-01 DIAGNOSIS — F41.9 ANXIETY: ICD-10-CM

## 2025-01-01 DIAGNOSIS — L29.9 ITCHING: ICD-10-CM

## 2025-01-01 DIAGNOSIS — F90.9 ATTENTION DEFICIT HYPERACTIVITY DISORDER (ADHD), UNSPECIFIED ADHD TYPE: ICD-10-CM

## 2025-01-02 RX ORDER — DEXTROAMPHETAMINE SACCHARATE, AMPHETAMINE ASPARTATE MONOHYDRATE, DEXTROAMPHETAMINE SULFATE AND AMPHETAMINE SULFATE 5; 5; 5; 5 MG/1; MG/1; MG/1; MG/1
20 CAPSULE, EXTENDED RELEASE ORAL EVERY MORNING
Qty: 30 CAPSULE | Refills: 0 | Status: SHIPPED | OUTPATIENT
Start: 2025-01-02 | End: 2025-02-04 | Stop reason: SDUPTHER

## 2025-01-02 RX ORDER — HYDROXYZINE HYDROCHLORIDE 25 MG/1
25 TABLET, FILM COATED ORAL EVERY 8 HOURS PRN
Qty: 90 TABLET | Refills: 2 | OUTPATIENT
Start: 2025-01-02 | End: 2025-04-02

## 2025-01-02 RX ORDER — DEXTROAMPHETAMINE SACCHARATE, AMPHETAMINE ASPARTATE, DEXTROAMPHETAMINE SULFATE AND AMPHETAMINE SULFATE 1.25; 1.25; 1.25; 1.25 MG/1; MG/1; MG/1; MG/1
5 TABLET ORAL
Qty: 30 TABLET | Refills: 0 | Status: SHIPPED | OUTPATIENT
Start: 2025-01-02 | End: 2025-02-04 | Stop reason: SDUPTHER

## 2025-02-04 DIAGNOSIS — F41.9 ANXIETY: ICD-10-CM

## 2025-02-04 DIAGNOSIS — L29.9 ITCHING: ICD-10-CM

## 2025-02-04 DIAGNOSIS — F90.9 ATTENTION DEFICIT HYPERACTIVITY DISORDER (ADHD), UNSPECIFIED ADHD TYPE: ICD-10-CM

## 2025-02-04 RX ORDER — DEXTROAMPHETAMINE SACCHARATE, AMPHETAMINE ASPARTATE MONOHYDRATE, DEXTROAMPHETAMINE SULFATE AND AMPHETAMINE SULFATE 5; 5; 5; 5 MG/1; MG/1; MG/1; MG/1
20 CAPSULE, EXTENDED RELEASE ORAL EVERY MORNING
Qty: 30 CAPSULE | Refills: 0 | Status: SHIPPED | OUTPATIENT
Start: 2025-02-06 | End: 2025-02-07 | Stop reason: DRUGHIGH

## 2025-02-04 RX ORDER — DEXTROAMPHETAMINE SACCHARATE, AMPHETAMINE ASPARTATE, DEXTROAMPHETAMINE SULFATE AND AMPHETAMINE SULFATE 1.25; 1.25; 1.25; 1.25 MG/1; MG/1; MG/1; MG/1
5 TABLET ORAL
Qty: 30 TABLET | Refills: 0 | Status: SHIPPED | OUTPATIENT
Start: 2025-02-06 | End: 2025-02-07 | Stop reason: DRUGHIGH

## 2025-02-04 RX ORDER — HYDROXYZINE HYDROCHLORIDE 25 MG/1
12.5 TABLET, FILM COATED ORAL EVERY 8 HOURS PRN
Qty: 45 TABLET | Refills: 2 | Status: SHIPPED | OUTPATIENT
Start: 2025-02-04 | End: 2025-02-07

## 2025-02-04 NOTE — TELEPHONE ENCOUNTER
Last Office Visit  -  10/16/2/4  Next Office Visit  -  n/a    Last Filled  -  1/2/25  Last UDS -  2/7/23  Contract -  n/a

## 2025-02-07 ENCOUNTER — TELEMEDICINE (OUTPATIENT)
Dept: PULMONOLOGY | Age: 35
End: 2025-02-07
Payer: COMMERCIAL

## 2025-02-07 ENCOUNTER — TELEMEDICINE (OUTPATIENT)
Dept: FAMILY MEDICINE CLINIC | Age: 35
End: 2025-02-07

## 2025-02-07 ENCOUNTER — TELEPHONE (OUTPATIENT)
Dept: PULMONOLOGY | Age: 35
End: 2025-02-07

## 2025-02-07 DIAGNOSIS — G47.33 MODERATE OBSTRUCTIVE SLEEP APNEA: Primary | ICD-10-CM

## 2025-02-07 DIAGNOSIS — Z71.89 CPAP USE COUNSELING: ICD-10-CM

## 2025-02-07 DIAGNOSIS — F90.9 ATTENTION DEFICIT HYPERACTIVITY DISORDER (ADHD), UNSPECIFIED ADHD TYPE: ICD-10-CM

## 2025-02-07 DIAGNOSIS — F41.9 ANXIETY: Primary | ICD-10-CM

## 2025-02-07 DIAGNOSIS — R53.83 OTHER FATIGUE: ICD-10-CM

## 2025-02-07 PROCEDURE — G8427 DOCREV CUR MEDS BY ELIG CLIN: HCPCS | Performed by: NURSE PRACTITIONER

## 2025-02-07 PROCEDURE — 99213 OFFICE O/P EST LOW 20 MIN: CPT | Performed by: NURSE PRACTITIONER

## 2025-02-07 RX ORDER — ESCITALOPRAM OXALATE 5 MG/1
5 TABLET ORAL DAILY
Qty: 30 TABLET | Refills: 0 | Status: SHIPPED | OUTPATIENT
Start: 2025-02-07

## 2025-02-07 RX ORDER — DEXTROAMPHETAMINE SACCHARATE, AMPHETAMINE ASPARTATE MONOHYDRATE, DEXTROAMPHETAMINE SULFATE AND AMPHETAMINE SULFATE 3.75; 3.75; 3.75; 3.75 MG/1; MG/1; MG/1; MG/1
15 CAPSULE, EXTENDED RELEASE ORAL EVERY MORNING
Qty: 30 CAPSULE | Refills: 0 | Status: SHIPPED | OUTPATIENT
Start: 2025-02-07 | End: 2025-03-09

## 2025-02-07 RX ORDER — DEXTROAMPHETAMINE SACCHARATE, AMPHETAMINE ASPARTATE MONOHYDRATE, DEXTROAMPHETAMINE SULFATE AND AMPHETAMINE SULFATE 2.5; 2.5; 2.5; 2.5 MG/1; MG/1; MG/1; MG/1
10 CAPSULE, EXTENDED RELEASE ORAL
Qty: 30 CAPSULE | Refills: 0 | Status: SHIPPED | OUTPATIENT
Start: 2025-02-07 | End: 2025-03-09

## 2025-02-07 SDOH — ECONOMIC STABILITY: FOOD INSECURITY: WITHIN THE PAST 12 MONTHS, YOU WORRIED THAT YOUR FOOD WOULD RUN OUT BEFORE YOU GOT MONEY TO BUY MORE.: NEVER TRUE

## 2025-02-07 SDOH — ECONOMIC STABILITY: FOOD INSECURITY: WITHIN THE PAST 12 MONTHS, THE FOOD YOU BOUGHT JUST DIDN'T LAST AND YOU DIDN'T HAVE MONEY TO GET MORE.: NEVER TRUE

## 2025-02-07 ASSESSMENT — PATIENT HEALTH QUESTIONNAIRE - PHQ9
SUM OF ALL RESPONSES TO PHQ QUESTIONS 1-9: 7
SUM OF ALL RESPONSES TO PHQ QUESTIONS 1-9: 7
8. MOVING OR SPEAKING SO SLOWLY THAT OTHER PEOPLE COULD HAVE NOTICED. OR THE OPPOSITE, BEING SO FIGETY OR RESTLESS THAT YOU HAVE BEEN MOVING AROUND A LOT MORE THAN USUAL: SEVERAL DAYS
5. POOR APPETITE OR OVEREATING: SEVERAL DAYS
SUM OF ALL RESPONSES TO PHQ QUESTIONS 1-9: 7
6. FEELING BAD ABOUT YOURSELF - OR THAT YOU ARE A FAILURE OR HAVE LET YOURSELF OR YOUR FAMILY DOWN: SEVERAL DAYS
4. FEELING TIRED OR HAVING LITTLE ENERGY: SEVERAL DAYS
1. LITTLE INTEREST OR PLEASURE IN DOING THINGS: SEVERAL DAYS
3. TROUBLE FALLING OR STAYING ASLEEP: NOT AT ALL
7. TROUBLE CONCENTRATING ON THINGS, SUCH AS READING THE NEWSPAPER OR WATCHING TELEVISION: SEVERAL DAYS
9. THOUGHTS THAT YOU WOULD BE BETTER OFF DEAD, OR OF HURTING YOURSELF: NOT AT ALL
2. FEELING DOWN, DEPRESSED OR HOPELESS: SEVERAL DAYS
10. IF YOU CHECKED OFF ANY PROBLEMS, HOW DIFFICULT HAVE THESE PROBLEMS MADE IT FOR YOU TO DO YOUR WORK, TAKE CARE OF THINGS AT HOME, OR GET ALONG WITH OTHER PEOPLE: SOMEWHAT DIFFICULT
SUM OF ALL RESPONSES TO PHQ QUESTIONS 1-9: 7
SUM OF ALL RESPONSES TO PHQ9 QUESTIONS 1 & 2: 2

## 2025-02-07 ASSESSMENT — SLEEP AND FATIGUE QUESTIONNAIRES
HOW LIKELY ARE YOU TO NOD OFF OR FALL ASLEEP WHILE LYING DOWN TO REST IN THE AFTERNOON WHEN CIRCUMSTANCES PERMIT: SLIGHT CHANCE OF DOZING
HOW LIKELY ARE YOU TO NOD OFF OR FALL ASLEEP WHILE SITTING QUIETLY AFTER LUNCH WITHOUT ALCOHOL: SLIGHT CHANCE OF DOZING
HOW LIKELY ARE YOU TO NOD OFF OR FALL ASLEEP WHEN YOU ARE A PASSENGER IN A CAR FOR AN HOUR WITHOUT A BREAK: WOULD NEVER DOZE
HOW LIKELY ARE YOU TO NOD OFF OR FALL ASLEEP WHILE SITTING AND TALKING TO SOMEONE: WOULD NEVER DOZE
ESS TOTAL SCORE: 3
HOW LIKELY ARE YOU TO NOD OFF OR FALL ASLEEP WHILE SITTING INACTIVE IN A PUBLIC PLACE: WOULD NEVER DOZE
HOW LIKELY ARE YOU TO NOD OFF OR FALL ASLEEP IN A CAR, WHILE STOPPED FOR A FEW MINUTES IN TRAFFIC: WOULD NEVER DOZE
HOW LIKELY ARE YOU TO NOD OFF OR FALL ASLEEP WHILE WATCHING TV: WOULD NEVER DOZE
HOW LIKELY ARE YOU TO NOD OFF OR FALL ASLEEP WHILE SITTING AND READING: SLIGHT CHANCE OF DOZING

## 2025-02-07 NOTE — TELEPHONE ENCOUNTER
Patient called and said he just got off the phone with [facility] and stated that they need a the face to face note stating that patient was noncompliant but will to restart therapy.  Could you add this in the OV  if appropriate?

## 2025-02-07 NOTE — PROGRESS NOTES
Del Regan (: 1990) is a 34 y.o. male is here for evaluation of the following chief complaint(s): Medication Check    Assessment/Plan:     Assessment & Plan  Anxiety    Chronic.  Partially caught controlled on hydroxyzine but having side effects.  Will start Lexapro at this time.    Orders:    escitalopram (LEXAPRO) 5 MG tablet; Take 1 tablet by mouth daily    Attention deficit hyperactivity disorder (ADHD), unspecified ADHD type    Chronic.  Stable.  On Adderall.  Would like a slightly lower dose in the morning and a slightly higher dose and XR in the afternoon.  Will make the switch at this time.    Orders:    amphetamine-dextroamphetamine (ADDERALL XR) 15 MG extended release capsule; Take 1 capsule by mouth every morning for 30 days. Max Daily Amount: 15 mg    amphetamine-dextroamphetamine (ADDERALL XR) 10 MG extended release capsule; Take 1 capsule by mouth Daily with lunch for 30 days. Max Daily Amount: 10 mg    No follow-ups on file.  Subjective/Objective:   In regard to ADHD , since last visit: no change.  Medication compliance: all of the time.  Side effects from medication include: None. Denies hypertension, dizziness, weight loss, palpitations, and insomnia.   has been reviewed.     Patient-Reported Vitals: None  Patient-Reported Weight: 205  Patient-Reported Height: 5'9\"     In regard to generalized anxiety disorder, the patient states that the hydroxyzine is not working well for him and it is making him sleepy.  Patient states that his partner has also noted some depressive symptoms and him, he is interested in switching to a medication that is once daily.  He is amenable to an SSRI at this time.        Del Regan, was evaluated through a synchronous (real-time) audio-video encounter. The patient (or guardian if applicable) is aware that this is a billable service, which includes applicable co-pays. This Virtual Visit was conducted with patient's (and/or legal guardian's) consent.

## 2025-02-07 NOTE — PROGRESS NOTES
Patient ID: Del Regan is a 34 y.o. male who is being seen today for   Chief Complaint   Patient presents with    Sleep Apnea     Referring: Dr. Agusto Zuniga    HPI:     Del Regan is a 34 y.o. male for televideo appointment via video and audio virtual visit for ALIRIO follow up.  States he still forgets to put it on at night, has been noncompliant however benefits from CPAP use and wants to be compliant.  Patient is using CPAP   6 hrs/night. Using humidifier.  States he needs heated tubing due to getting condensation in the tubing.  No snoring on CPAP. The pressure is well tolerated. The mask is somewhat comfortable- full face. No mask leak. No significant daytime sleepiness. No nodding off when driving. No dry nose or throat. +fatigue. Bedtime is 1 am and rise time is 730-830 am. Sleep onset is few minutes. Wakes up 0-1 times at night total. 0-1 nocturia. It takes usually few minutes to fall back a sleep. No naps during the day. No headache in am. No weight gain. 1-2 caffienated beverages during the day. No alcohol. ESS is 3        Previous HPI 11/8/24  eDl Regan is a 34 y.o. male in office for ALIRIO follow up.  States he just got  and had not been using CPAP as much leading up to wedding, also falls asleep without it at times. He does feel better when using CPAP. Patient is using CPAP   4-6 hrs/night. Using humidifier. No snoring on CPAP. The pressure is well tolerated. The mask is somewhat comfortable- full face.  States he might want to try nasal mask.  No mask leak. No significant daytime sleepiness. No nodding off when driving. No dry nose or throat. No fatigue. Bedtime is MN-1 pm and rise time is 7 am. Sleep onset is few minutes. Wakes up 1-2 times at night total. 1-2 nocturia. It takes few minutes to fall back a sleep. No naps during the day. No headache in am. No weight gain. 2 caffienated beverages during the day. 1 beer a night. ESS is 2.          Previous HPI 5/7/24  Del

## 2025-02-11 ENCOUNTER — TELEMEDICINE (OUTPATIENT)
Dept: FAMILY MEDICINE CLINIC | Age: 35
End: 2025-02-11

## 2025-02-11 DIAGNOSIS — R04.0 BLEEDING FROM THE NOSE: Primary | ICD-10-CM

## 2025-02-11 ASSESSMENT — ENCOUNTER SYMPTOMS
RESPIRATORY NEGATIVE: 1
GASTROINTESTINAL NEGATIVE: 1

## 2025-02-11 NOTE — PROGRESS NOTES
Del Regan (:  1990) is a Established patient, presenting virtually for evaluation of the following:    Assessment & Plan   Below is the assessment and plan developed based on review of pertinent history, physical exam, labs, studies, and medications.  Assessment & Plan  Bleeding from the nose    Recommend patient try stopping the Flonase at least on the right nostril and try using Vaseline in the nose to help keep it hydrated.  If there is no improvement would recommend he see ENT to further evaluate.  Patient also uses a humidifier with a CPAP.                   Subjective   HPI  Patient seen with concerns of a bloody nose in the morning.  He states he is blowing out bloody mucus for the past couple months and the past couple days he has had a full 1 nosebleed from the right nostril.  He states the right side is always dark with some blood.  He has been using Flonase during this timeframe but did stop using it in that right nostril with the nosebleed.  He states that if he takes a shower with the steam and uses his CPAP with the humidifier that does seem to help the symptoms.          Review of Systems   Constitutional: Negative.    HENT:  Positive for nosebleeds.    Respiratory: Negative.     Cardiovascular: Negative.    Gastrointestinal: Negative.    Musculoskeletal: Negative.    Skin: Negative.    Neurological: Negative.    Psychiatric/Behavioral: Negative.            Objective   Patient-Reported Vitals  Patient-Reported Pulse: 92  Patient-Reported Weight: 205  Patient-Reported Height: 5'-9\"       Physical Exam  [INSTRUCTIONS:  \"[x]\" Indicates a positive item  \"[]\" Indicates a negative item  -- DELETE ALL ITEMS NOT EXAMINED]    Constitutional: [x] Appears well-developed and well-nourished [x] No apparent distress      [] Abnormal -     Mental status: [x] Alert and awake  [x] Oriented to person/place/time [x] Able to follow commands    [] Abnormal -     Eyes:   EOM    [x]  Normal    [] Abnormal -

## 2025-03-09 DIAGNOSIS — F41.9 ANXIETY: ICD-10-CM

## 2025-03-09 DIAGNOSIS — F90.9 ATTENTION DEFICIT HYPERACTIVITY DISORDER (ADHD), UNSPECIFIED ADHD TYPE: ICD-10-CM

## 2025-03-09 RX ORDER — ESCITALOPRAM OXALATE 5 MG/1
5 TABLET ORAL DAILY
Qty: 30 TABLET | Refills: 0 | Status: SHIPPED | OUTPATIENT
Start: 2025-03-09

## 2025-03-09 RX ORDER — DEXTROAMPHETAMINE SACCHARATE, AMPHETAMINE ASPARTATE MONOHYDRATE, DEXTROAMPHETAMINE SULFATE AND AMPHETAMINE SULFATE 2.5; 2.5; 2.5; 2.5 MG/1; MG/1; MG/1; MG/1
10 CAPSULE, EXTENDED RELEASE ORAL
Qty: 30 CAPSULE | Refills: 0 | Status: SHIPPED | OUTPATIENT
Start: 2025-03-09 | End: 2025-04-08

## 2025-03-09 RX ORDER — DEXTROAMPHETAMINE SACCHARATE, AMPHETAMINE ASPARTATE MONOHYDRATE, DEXTROAMPHETAMINE SULFATE AND AMPHETAMINE SULFATE 3.75; 3.75; 3.75; 3.75 MG/1; MG/1; MG/1; MG/1
15 CAPSULE, EXTENDED RELEASE ORAL EVERY MORNING
Qty: 30 CAPSULE | Refills: 0 | Status: SHIPPED | OUTPATIENT
Start: 2025-03-09 | End: 2025-04-08

## 2025-03-31 ENCOUNTER — OFFICE VISIT (OUTPATIENT)
Dept: FAMILY MEDICINE CLINIC | Age: 35
End: 2025-03-31
Payer: COMMERCIAL

## 2025-03-31 VITALS
HEART RATE: 78 BPM | OXYGEN SATURATION: 98 % | DIASTOLIC BLOOD PRESSURE: 70 MMHG | BODY MASS INDEX: 30.69 KG/M2 | SYSTOLIC BLOOD PRESSURE: 110 MMHG | WEIGHT: 207.2 LBS | HEIGHT: 69 IN

## 2025-03-31 DIAGNOSIS — K92.1 BLOOD IN STOOL: Primary | ICD-10-CM

## 2025-03-31 DIAGNOSIS — R10.11 RUQ PAIN: ICD-10-CM

## 2025-03-31 DIAGNOSIS — K59.00 CONSTIPATION, UNSPECIFIED CONSTIPATION TYPE: ICD-10-CM

## 2025-03-31 PROCEDURE — G8427 DOCREV CUR MEDS BY ELIG CLIN: HCPCS | Performed by: NURSE PRACTITIONER

## 2025-03-31 PROCEDURE — 99213 OFFICE O/P EST LOW 20 MIN: CPT | Performed by: NURSE PRACTITIONER

## 2025-03-31 PROCEDURE — 1036F TOBACCO NON-USER: CPT | Performed by: NURSE PRACTITIONER

## 2025-03-31 PROCEDURE — G8417 CALC BMI ABV UP PARAM F/U: HCPCS | Performed by: NURSE PRACTITIONER

## 2025-03-31 ASSESSMENT — ENCOUNTER SYMPTOMS
CONSTIPATION: 1
BLOOD IN STOOL: 1
ABDOMINAL PAIN: 1

## 2025-03-31 NOTE — PROGRESS NOTES
Patient: Del Regan is a 34 y.o. male who presents today with the following Chief Complaint(s):  Chief Complaint   Patient presents with    Other     Constipation, blood in stool         HPI  Here with c/o RUQ pain off and on after eating- going on for a few months- noticed it more recently after he had some italian food.  Constipation: has had for a long time- was on miralax for awhile- smoothed things out   Blood in stool over the last week a few times- happens off and on- thinks related to his possible hemorrhoids but has also seen blood in with more loose stools.   Also has gas after drinking \"bubbly\" drinks- discussed could be normal after carbonated drinks- adds gas to the stomach.    Tries MCT oil in protein shake with shot of espresso in the morning- seems to help with constipation     Current Outpatient Medications   Medication Sig Dispense Refill    escitalopram (LEXAPRO) 5 MG tablet Take 1 tablet by mouth daily 30 tablet 0    amphetamine-dextroamphetamine (ADDERALL XR) 15 MG extended release capsule Take 1 capsule by mouth every morning for 30 days. Max Daily Amount: 15 mg 30 capsule 0    amphetamine-dextroamphetamine (ADDERALL XR) 10 MG extended release capsule Take 1 capsule by mouth Daily with lunch for 30 days. Max Daily Amount: 10 mg 30 capsule 0    ketoconazole (NIZORAL) 2 % shampoo       Multiple Vitamins-Minerals (THERAPEUTIC MULTIVITAMIN-MINERALS) tablet Take 1 tablet by mouth daily      Probiotic Product (PROBIOTIC DAILY PO) Take by mouth daily      fluticasone (FLONASE) 50 MCG/ACT nasal spray 2 sprays by Each Nostril route daily 16 g 0    Naproxen Sodium (ALEVE PO) Take by mouth      fexofenadine (ALLEGRA) 180 MG tablet Take by mouth daily      clindamycin (CLEOCIN T) 1 % external solution Apply topically 2 times daily Apply topically 2 times daily. (Patient not taking: Reported on 3/31/2025)       No current facility-administered medications for this visit.       Patient's past medical

## 2025-04-08 DIAGNOSIS — F90.9 ATTENTION DEFICIT HYPERACTIVITY DISORDER (ADHD), UNSPECIFIED ADHD TYPE: ICD-10-CM

## 2025-04-08 DIAGNOSIS — F41.9 ANXIETY: ICD-10-CM

## 2025-04-08 RX ORDER — DEXTROAMPHETAMINE SACCHARATE, AMPHETAMINE ASPARTATE MONOHYDRATE, DEXTROAMPHETAMINE SULFATE AND AMPHETAMINE SULFATE 2.5; 2.5; 2.5; 2.5 MG/1; MG/1; MG/1; MG/1
10 CAPSULE, EXTENDED RELEASE ORAL
Qty: 30 CAPSULE | Refills: 0 | Status: SHIPPED | OUTPATIENT
Start: 2025-04-17 | End: 2025-05-17

## 2025-04-08 RX ORDER — DEXTROAMPHETAMINE SACCHARATE, AMPHETAMINE ASPARTATE MONOHYDRATE, DEXTROAMPHETAMINE SULFATE AND AMPHETAMINE SULFATE 3.75; 3.75; 3.75; 3.75 MG/1; MG/1; MG/1; MG/1
15 CAPSULE, EXTENDED RELEASE ORAL DAILY
Qty: 30 CAPSULE | Refills: 0 | Status: SHIPPED | OUTPATIENT
Start: 2025-04-17 | End: 2025-05-17

## 2025-04-08 RX ORDER — ESCITALOPRAM OXALATE 5 MG/1
5 TABLET ORAL DAILY
Qty: 90 TABLET | Refills: 1 | Status: SHIPPED | OUTPATIENT
Start: 2025-04-08

## 2025-04-08 RX ORDER — DEXTROAMPHETAMINE SACCHARATE, AMPHETAMINE ASPARTATE MONOHYDRATE, DEXTROAMPHETAMINE SULFATE AND AMPHETAMINE SULFATE 2.5; 2.5; 2.5; 2.5 MG/1; MG/1; MG/1; MG/1
10 CAPSULE, EXTENDED RELEASE ORAL
Qty: 30 CAPSULE | Refills: 0 | OUTPATIENT
Start: 2025-04-08 | End: 2025-05-08

## 2025-04-08 RX ORDER — DEXTROAMPHETAMINE SACCHARATE, AMPHETAMINE ASPARTATE MONOHYDRATE, DEXTROAMPHETAMINE SULFATE AND AMPHETAMINE SULFATE 3.75; 3.75; 3.75; 3.75 MG/1; MG/1; MG/1; MG/1
15 CAPSULE, EXTENDED RELEASE ORAL EVERY MORNING
Qty: 8 CAPSULE | Refills: 0 | Status: SHIPPED | OUTPATIENT
Start: 2025-04-11 | End: 2025-04-08

## 2025-04-08 RX ORDER — DEXTROAMPHETAMINE SACCHARATE, AMPHETAMINE ASPARTATE MONOHYDRATE, DEXTROAMPHETAMINE SULFATE AND AMPHETAMINE SULFATE 3.75; 3.75; 3.75; 3.75 MG/1; MG/1; MG/1; MG/1
15 CAPSULE, EXTENDED RELEASE ORAL EVERY MORNING
Qty: 8 CAPSULE | Refills: 0 | Status: SHIPPED | OUTPATIENT
Start: 2025-04-11 | End: 2025-04-19

## 2025-04-08 NOTE — TELEPHONE ENCOUNTER
Last Office Visit  -  3/31/25  Next Office Visit  -  n/a    Last Filled  -  3/9/25  Last UDS -  2/7/23  Contract -  n/a    Patient comment: This was out of stock a couple months ago, and must be refilles almost 2 weeks after my other adderall prescription. What can i do to refill these at the same time again?

## 2025-04-14 ENCOUNTER — HOSPITAL ENCOUNTER (OUTPATIENT)
Dept: ULTRASOUND IMAGING | Age: 35
Discharge: HOME | End: 2025-04-14
Payer: COMMERCIAL

## 2025-04-14 ENCOUNTER — RESULTS FOLLOW-UP (OUTPATIENT)
Dept: FAMILY MEDICINE CLINIC | Age: 35
End: 2025-04-14

## 2025-04-14 DIAGNOSIS — R93.2 ABNORMAL LIVER ULTRASOUND: Primary | ICD-10-CM

## 2025-04-14 DIAGNOSIS — R10.11 RUQ PAIN: ICD-10-CM

## 2025-04-14 PROCEDURE — 76705 ECHO EXAM OF ABDOMEN: CPT

## 2025-04-16 ENCOUNTER — TELEPHONE (OUTPATIENT)
Dept: FAMILY MEDICINE CLINIC | Age: 35
End: 2025-04-16

## 2025-04-16 DIAGNOSIS — R93.2 ABNORMAL LIVER ULTRASOUND: Primary | ICD-10-CM

## 2025-04-16 NOTE — TELEPHONE ENCOUNTER
Central scheduling is requesting an order for mri that states with and without. That is protocol for this test.

## 2025-04-24 ENCOUNTER — HOSPITAL ENCOUNTER (OUTPATIENT)
Dept: MRI IMAGING | Age: 35
Discharge: HOME | End: 2025-04-24
Attending: STUDENT IN AN ORGANIZED HEALTH CARE EDUCATION/TRAINING PROGRAM
Payer: COMMERCIAL

## 2025-04-24 ENCOUNTER — RESULTS FOLLOW-UP (OUTPATIENT)
Dept: FAMILY MEDICINE CLINIC | Age: 35
End: 2025-04-24

## 2025-04-24 DIAGNOSIS — R93.2 ABNORMAL LIVER ULTRASOUND: ICD-10-CM

## 2025-04-24 PROCEDURE — A9581 GADOXETATE DISODIUM INJ: HCPCS | Performed by: STUDENT IN AN ORGANIZED HEALTH CARE EDUCATION/TRAINING PROGRAM

## 2025-04-24 PROCEDURE — 74183 MRI ABD W/O CNTR FLWD CNTR: CPT

## 2025-04-24 PROCEDURE — 6360000004 HC RX CONTRAST MEDICATION: Performed by: STUDENT IN AN ORGANIZED HEALTH CARE EDUCATION/TRAINING PROGRAM

## 2025-04-24 RX ADMIN — GADOXETATE DISODIUM 9 ML: 181.43 INJECTION, SOLUTION INTRAVENOUS at 09:21

## 2025-04-25 ENCOUNTER — TELEMEDICINE (OUTPATIENT)
Dept: PULMONOLOGY | Age: 35
End: 2025-04-25
Payer: COMMERCIAL

## 2025-04-25 ENCOUNTER — TELEPHONE (OUTPATIENT)
Dept: PULMONOLOGY | Age: 35
End: 2025-04-25

## 2025-04-25 DIAGNOSIS — Z71.89 CPAP USE COUNSELING: ICD-10-CM

## 2025-04-25 DIAGNOSIS — R53.83 OTHER FATIGUE: ICD-10-CM

## 2025-04-25 DIAGNOSIS — E66.9 OBESITY (BMI 30-39.9): ICD-10-CM

## 2025-04-25 DIAGNOSIS — G47.33 MODERATE OBSTRUCTIVE SLEEP APNEA: Primary | ICD-10-CM

## 2025-04-25 DIAGNOSIS — K76.0 FATTY LIVER: Primary | ICD-10-CM

## 2025-04-25 PROCEDURE — G8427 DOCREV CUR MEDS BY ELIG CLIN: HCPCS | Performed by: NURSE PRACTITIONER

## 2025-04-25 PROCEDURE — 99213 OFFICE O/P EST LOW 20 MIN: CPT | Performed by: NURSE PRACTITIONER

## 2025-04-25 ASSESSMENT — SLEEP AND FATIGUE QUESTIONNAIRES
HOW LIKELY ARE YOU TO NOD OFF OR FALL ASLEEP IN A CAR, WHILE STOPPED FOR A FEW MINUTES IN TRAFFIC: WOULD NEVER DOZE
HOW LIKELY ARE YOU TO NOD OFF OR FALL ASLEEP WHILE SITTING INACTIVE IN A PUBLIC PLACE: WOULD NEVER DOZE
HOW LIKELY ARE YOU TO NOD OFF OR FALL ASLEEP WHEN YOU ARE A PASSENGER IN A CAR FOR AN HOUR WITHOUT A BREAK: WOULD NEVER DOZE
HOW LIKELY ARE YOU TO NOD OFF OR FALL ASLEEP WHILE SITTING AND READING: SLIGHT CHANCE OF DOZING
HOW LIKELY ARE YOU TO NOD OFF OR FALL ASLEEP WHILE SITTING AND TALKING TO SOMEONE: WOULD NEVER DOZE
HOW LIKELY ARE YOU TO NOD OFF OR FALL ASLEEP WHILE SITTING QUIETLY AFTER LUNCH WITHOUT ALCOHOL: SLIGHT CHANCE OF DOZING
ESS TOTAL SCORE: 4
HOW LIKELY ARE YOU TO NOD OFF OR FALL ASLEEP WHILE LYING DOWN TO REST IN THE AFTERNOON WHEN CIRCUMSTANCES PERMIT: SLIGHT CHANCE OF DOZING
HOW LIKELY ARE YOU TO NOD OFF OR FALL ASLEEP WHILE WATCHING TV: SLIGHT CHANCE OF DOZING

## 2025-04-25 NOTE — PROGRESS NOTES
Patient ID: Del Regan is a 34 y.o. male who is being seen today for   No chief complaint on file.    Referring: Dr. Agusto Zuniga    HPI:   Del Regan is a 34 y.o. male for televideo appointment via video and audio virtual visit for ALIRIO follow up.  States he is doing better with CPAP.  Patient is using CPAP   6 hrs/night. Using humidifier. No snoring on CPAP. The pressure is well tolerated. The mask is somewhat comfortable- full face- did just get partial full face mask today that he has not tried yet. No mask leak. No significant daytime sleepiness. No nodding off when driving. No dry nose or throat. Some fatigue. Bedtime is 11 pm-MN and rise time is 7 am. Sleep onset is 5-10 minutes. Wakes up 0-1 times at night total. 0-1 nocturia. It takes few minutes to fall back a sleep. No naps during the day. No headache in am. No weight gain. 2 caffienated beverages during the day. 1 alcoholic drink every other night. ESS is 3          Previous HPI   Del Regan is a 34 y.o. male for televideo appointment via video and audio virtual visit for ALIRIO follow up.  States he still forgets to put it on at night, has been noncompliant however benefits from CPAP use and wants to be compliant.  Patient is using CPAP   6 hrs/night. Using humidifier.  States he needs heated tubing due to getting condensation in the tubing.  No snoring on CPAP. The pressure is well tolerated. The mask is somewhat comfortable- full face. No mask leak. No significant daytime sleepiness. No nodding off when driving. No dry nose or throat. +fatigue. Bedtime is 1 am and rise time is 730-830 am. Sleep onset is few minutes. Wakes up 0-1 times at night total. 0-1 nocturia. It takes usually few minutes to fall back a sleep. No naps during the day. No headache in am. No weight gain. 1-2 caffienated beverages during the day. No alcohol. ESS is 3        Previous HPI 11/8/24  Del Regan is a 34 y.o. male in office for ALIRIO follow up.  States he

## 2025-04-26 LAB
ALBUMIN SERPL-MCNC: 4.7 G/DL (ref 3.4–5)
ALBUMIN/GLOB SERPL: 2 {RATIO} (ref 1.1–2.2)
ALP SERPL-CCNC: 62 U/L (ref 40–129)
ALT SERPL-CCNC: 33 U/L (ref 10–40)
ANION GAP SERPL CALCULATED.3IONS-SCNC: 11 MMOL/L (ref 3–16)
AST SERPL-CCNC: 19 U/L (ref 15–37)
BASOPHILS # BLD: 0.1 K/UL (ref 0–0.2)
BASOPHILS NFR BLD: 1.9 %
BILIRUB SERPL-MCNC: 0.5 MG/DL (ref 0–1)
BUN SERPL-MCNC: 20 MG/DL (ref 7–20)
CALCIUM SERPL-MCNC: 9.6 MG/DL (ref 8.3–10.6)
CHLORIDE SERPL-SCNC: 103 MMOL/L (ref 99–110)
CO2 SERPL-SCNC: 26 MMOL/L (ref 21–32)
CREAT SERPL-MCNC: 0.8 MG/DL (ref 0.9–1.3)
DEPRECATED RDW RBC AUTO: 12.9 % (ref 12.4–15.4)
EOSINOPHIL # BLD: 0.1 K/UL (ref 0–0.6)
EOSINOPHIL NFR BLD: 1.9 %
FERRITIN SERPL IA-MCNC: 159 NG/ML (ref 30–400)
GFR SERPLBLD CREATININE-BSD FMLA CKD-EPI: >90 ML/MIN/{1.73_M2}
GLUCOSE SERPL-MCNC: 91 MG/DL (ref 70–99)
HBV SURFACE AB SERPL IA-ACNC: 8.74 MIU/ML
HBV SURFACE AG SERPL QL IA: NORMAL
HCT VFR BLD AUTO: 42.5 % (ref 40.5–52.5)
HCV AB SERPL QL IA: NORMAL
HGB BLD-MCNC: 14.6 G/DL (ref 13.5–17.5)
IGA SERPL-MCNC: 210 MG/DL (ref 70–400)
IRON SATN MFR SERPL: 32 % (ref 20–50)
IRON SERPL-MCNC: 108 UG/DL (ref 59–158)
LYMPHOCYTES # BLD: 1.6 K/UL (ref 1–5.1)
LYMPHOCYTES NFR BLD: 25.8 %
MCH RBC QN AUTO: 30.1 PG (ref 26–34)
MCHC RBC AUTO-ENTMCNC: 34.4 G/DL (ref 31–36)
MCV RBC AUTO: 87.4 FL (ref 80–100)
MONOCYTES # BLD: 0.5 K/UL (ref 0–1.3)
MONOCYTES NFR BLD: 8.5 %
NEUTROPHILS # BLD: 3.9 K/UL (ref 1.7–7.7)
NEUTROPHILS NFR BLD: 61.9 %
PLATELET # BLD AUTO: 296 K/UL (ref 135–450)
PMV BLD AUTO: 8.3 FL (ref 5–10.5)
POTASSIUM SERPL-SCNC: 4.4 MMOL/L (ref 3.5–5.1)
PROT SERPL-MCNC: 7 G/DL (ref 6.4–8.2)
RBC # BLD AUTO: 4.87 M/UL (ref 4.2–5.9)
SODIUM SERPL-SCNC: 140 MMOL/L (ref 136–145)
TIBC SERPL-MCNC: 335 UG/DL (ref 260–445)
TISSUE TRANSGLUTAMINASE IGA: <0.5 U/ML (ref 0–14)
WBC # BLD AUTO: 6.2 K/UL (ref 4–11)

## 2025-04-28 LAB
HAV AB SER QL IA: NEGATIVE
HBV CORE AB SERPL QL IA: NEGATIVE

## 2025-04-30 NOTE — PROGRESS NOTES
Del Regan    Age 34 y.o.    male    1990    MRN [id number]    5/7/2025  Arrival Time_____________  OR Time____________30 Min     Procedure(s):  COLONOSCOPY                      Monitor Anesthesia Care    Surgeon(s):  Pawan Lee, MD       Phone [phone] (home)     Initals  Date  Info Source  Home  Cell         Work  _____________________________________________________________________  _____________________________________________________________________  _____________________________________________________________________  _____________________________________________________________________  _____________________________________________________________________    PCP _____________________________ Phone_________________     H&P  ________________  Bringing      Chart              Epic      DOS      Called________  EKG ________________   Bringing      Chart              Epic      DOS      Called________  LABS________________   Bringing     Chart              Epic      DOS      Called________  Cardiac Clearance ______ Bringing      Chart              Epic      DOS      Called________  Pulmonary Clearance____ Bringing      Chart              Epic      DOS      Called________    Cardiologist________________________ Phone___________________________  Pulmonologist_______________________Phone___________________________    ? Advance Directives   ? Orthodox concerns / Waiver on Chart            PAT Communications________________  ? Pre-op Instructions Given /Understood          _________________________________  ? Directions to Surgery Center                          _________________________________  ? Transportation Home_______________      __________________________________  ? Crutches/Walker__________________        __________________________________    Orders: Hard copy/ EPIC                 Transcribed/ EPIC              _______Wt.    ________Pharmacy                         _______SCD

## 2025-05-02 NOTE — PROGRESS NOTES
Date and time of surgery : 5/7/25@0900            Arrival Time:  0800     Bring Picture ID and insurance card.  Please wear simple, loose fitting clothing to the hospital.   Do not bring valuables (money, credit cards, checkbooks, etc.)   Do not wear any makeup (including  eye makeup) and no nail polish or artificial nails on your fingers or toes.  DO NOT wear any jewelry or piercings on day of surgery.  All body piercing jewelry must be removed.  If you have dentures, they will be removed before going to the OR; we will provide you a container.  If you wear contact lenses or glasses, they will be removed; please bring a case for them.  Shower the evening before or morning of surgery with antibacterial soap.  Follow Dr. Lee's colonoscopy prep instructions   You may brush your teeth and gargle the morning of surgery.  DO NOT SWALLOW WATER.   Do not take any morning meds the day of your surgery.  Aspirin, Ibuprofen, Advil, Naproxen, Vitamin E and other Anti-inflammatory products and supplements should be stopped for 5 -7days before surgery or as directed by your physician.  Do not smoke or drink any alcoholic beverages 24 hours prior to surgery.  This includes NA Beer. Refrain from the usage of any recreational drugs, including non-prescribed prescription drugs.   You MUST plan for a responsible adult to stay on site while you are here and take you home after your surgery. You will not be allowed to leave alone or drive yourself home. It is strongly suggested someone stay with you the first 24 hrs. Your surgery will be cancelled if you do not have a ride home.    Notify your Surgeon if you develop any illness between now and time of surgery. Cough, cold, fever, sore throat, nausea, vomiting, etc.  Please notify your surgeon if you experience dizziness, shortness of breath or blurred vision between now & the time of your surgery  To provide excellent care visitors will be limited to two per room at any given time.

## 2025-05-06 ENCOUNTER — ANESTHESIA EVENT (OUTPATIENT)
Dept: ENDOSCOPY | Age: 35
End: 2025-05-06
Payer: COMMERCIAL

## 2025-05-07 ENCOUNTER — HOSPITAL ENCOUNTER (OUTPATIENT)
Age: 35
Setting detail: OUTPATIENT SURGERY
Discharge: HOME | End: 2025-05-07
Attending: INTERNAL MEDICINE | Admitting: INTERNAL MEDICINE
Payer: COMMERCIAL

## 2025-05-07 ENCOUNTER — ANESTHESIA (OUTPATIENT)
Dept: ENDOSCOPY | Age: 35
End: 2025-05-07
Payer: COMMERCIAL

## 2025-05-07 VITALS
OXYGEN SATURATION: 98 % | HEART RATE: 70 BPM | BODY MASS INDEX: 30.36 KG/M2 | RESPIRATION RATE: 16 BRPM | TEMPERATURE: 97.7 F | DIASTOLIC BLOOD PRESSURE: 80 MMHG | HEIGHT: 69 IN | WEIGHT: 205 LBS | SYSTOLIC BLOOD PRESSURE: 120 MMHG

## 2025-05-07 DIAGNOSIS — K62.5 RECTAL BLEEDING: ICD-10-CM

## 2025-05-07 PROCEDURE — 88305 TISSUE EXAM BY PATHOLOGIST: CPT

## 2025-05-07 PROCEDURE — 3700000000 HC ANESTHESIA ATTENDED CARE: Performed by: INTERNAL MEDICINE

## 2025-05-07 PROCEDURE — C1889 IMPLANT/INSERT DEVICE, NOC: HCPCS | Performed by: INTERNAL MEDICINE

## 2025-05-07 PROCEDURE — 3609009900 HC COLONOSCOPY W/CONTROL BLEEDING ANY METHOD: Performed by: INTERNAL MEDICINE

## 2025-05-07 PROCEDURE — 3700000001 HC ADD 15 MINUTES (ANESTHESIA): Performed by: INTERNAL MEDICINE

## 2025-05-07 PROCEDURE — 2580000003 HC RX 258: Performed by: STUDENT IN AN ORGANIZED HEALTH CARE EDUCATION/TRAINING PROGRAM

## 2025-05-07 PROCEDURE — 6360000002 HC RX W HCPCS: Performed by: NURSE ANESTHETIST, CERTIFIED REGISTERED

## 2025-05-07 PROCEDURE — 7100000010 HC PHASE II RECOVERY - FIRST 15 MIN: Performed by: INTERNAL MEDICINE

## 2025-05-07 PROCEDURE — 2709999900 HC NON-CHARGEABLE SUPPLY: Performed by: INTERNAL MEDICINE

## 2025-05-07 PROCEDURE — 7100000011 HC PHASE II RECOVERY - ADDTL 15 MIN: Performed by: INTERNAL MEDICINE

## 2025-05-07 PROCEDURE — 3609010600 HC COLONOSCOPY POLYPECTOMY SNARE/COLD BIOPSY: Performed by: INTERNAL MEDICINE

## 2025-05-07 PROCEDURE — 3609010300 HC COLONOSCOPY W/BIOPSY SINGLE/MULTIPLE: Performed by: INTERNAL MEDICINE

## 2025-05-07 PROCEDURE — 2580000003 HC RX 258: Performed by: NURSE ANESTHETIST, CERTIFIED REGISTERED

## 2025-05-07 DEVICE — WORKING LENGTH 235CM, WORKING CHANNEL 2.8MM
Type: IMPLANTABLE DEVICE | Site: SIGMOID COLON | Status: FUNCTIONAL
Brand: RESOLUTION 360 CLIP

## 2025-05-07 RX ORDER — SODIUM CHLORIDE, SODIUM LACTATE, POTASSIUM CHLORIDE, CALCIUM CHLORIDE 600; 310; 30; 20 MG/100ML; MG/100ML; MG/100ML; MG/100ML
INJECTION, SOLUTION INTRAVENOUS
Status: DISCONTINUED | OUTPATIENT
Start: 2025-05-07 | End: 2025-05-07 | Stop reason: SDUPTHER

## 2025-05-07 RX ORDER — LIDOCAINE HYDROCHLORIDE 10 MG/ML
1 INJECTION, SOLUTION EPIDURAL; INFILTRATION; INTRACAUDAL; PERINEURAL
Status: DISCONTINUED | OUTPATIENT
Start: 2025-05-07 | End: 2025-05-07 | Stop reason: HOSPADM

## 2025-05-07 RX ORDER — PROPOFOL 10 MG/ML
INJECTION, EMULSION INTRAVENOUS
Status: DISCONTINUED | OUTPATIENT
Start: 2025-05-07 | End: 2025-05-07 | Stop reason: SDUPTHER

## 2025-05-07 RX ORDER — SODIUM CHLORIDE 0.9 % (FLUSH) 0.9 %
5-40 SYRINGE (ML) INJECTION PRN
Status: DISCONTINUED | OUTPATIENT
Start: 2025-05-07 | End: 2025-05-07 | Stop reason: HOSPADM

## 2025-05-07 RX ORDER — SODIUM CHLORIDE 0.9 % (FLUSH) 0.9 %
5-40 SYRINGE (ML) INJECTION EVERY 12 HOURS SCHEDULED
Status: DISCONTINUED | OUTPATIENT
Start: 2025-05-07 | End: 2025-05-07 | Stop reason: HOSPADM

## 2025-05-07 RX ORDER — ONDANSETRON 2 MG/ML
4 INJECTION INTRAMUSCULAR; INTRAVENOUS EVERY 30 MIN PRN
Status: DISCONTINUED | OUTPATIENT
Start: 2025-05-07 | End: 2025-05-07 | Stop reason: HOSPADM

## 2025-05-07 RX ORDER — SODIUM CHLORIDE 9 MG/ML
INJECTION, SOLUTION INTRAVENOUS PRN
Status: DISCONTINUED | OUTPATIENT
Start: 2025-05-07 | End: 2025-05-07 | Stop reason: HOSPADM

## 2025-05-07 RX ORDER — MIDAZOLAM HYDROCHLORIDE 1 MG/ML
2 INJECTION, SOLUTION INTRAMUSCULAR; INTRAVENOUS
Status: DISCONTINUED | OUTPATIENT
Start: 2025-05-07 | End: 2025-05-07 | Stop reason: HOSPADM

## 2025-05-07 RX ORDER — LIDOCAINE HYDROCHLORIDE 20 MG/ML
INJECTION, SOLUTION INFILTRATION; PERINEURAL
Status: DISCONTINUED | OUTPATIENT
Start: 2025-05-07 | End: 2025-05-07 | Stop reason: SDUPTHER

## 2025-05-07 RX ORDER — SODIUM CHLORIDE, SODIUM LACTATE, POTASSIUM CHLORIDE, CALCIUM CHLORIDE 600; 310; 30; 20 MG/100ML; MG/100ML; MG/100ML; MG/100ML
INJECTION, SOLUTION INTRAVENOUS CONTINUOUS
Status: DISCONTINUED | OUTPATIENT
Start: 2025-05-07 | End: 2025-05-07 | Stop reason: HOSPADM

## 2025-05-07 RX ORDER — LABETALOL HYDROCHLORIDE 5 MG/ML
10 INJECTION, SOLUTION INTRAVENOUS
Status: DISCONTINUED | OUTPATIENT
Start: 2025-05-07 | End: 2025-05-07 | Stop reason: HOSPADM

## 2025-05-07 RX ORDER — NALOXONE HYDROCHLORIDE 0.4 MG/ML
INJECTION, SOLUTION INTRAMUSCULAR; INTRAVENOUS; SUBCUTANEOUS PRN
Status: DISCONTINUED | OUTPATIENT
Start: 2025-05-07 | End: 2025-05-07 | Stop reason: HOSPADM

## 2025-05-07 RX ADMIN — PROPOFOL 20 MG: 10 INJECTION, EMULSION INTRAVENOUS at 09:22

## 2025-05-07 RX ADMIN — SODIUM CHLORIDE, POTASSIUM CHLORIDE, SODIUM LACTATE AND CALCIUM CHLORIDE: 600; 310; 30; 20 INJECTION, SOLUTION INTRAVENOUS at 08:53

## 2025-05-07 RX ADMIN — PROPOFOL 120 MG: 10 INJECTION, EMULSION INTRAVENOUS at 09:18

## 2025-05-07 RX ADMIN — PROPOFOL 30 MG: 10 INJECTION, EMULSION INTRAVENOUS at 09:20

## 2025-05-07 RX ADMIN — PROPOFOL 100 MG: 10 INJECTION, EMULSION INTRAVENOUS at 09:32

## 2025-05-07 RX ADMIN — PROPOFOL 30 MG: 10 INJECTION, EMULSION INTRAVENOUS at 09:27

## 2025-05-07 RX ADMIN — SODIUM CHLORIDE, SODIUM LACTATE, POTASSIUM CHLORIDE, AND CALCIUM CHLORIDE: .6; .31; .03; .02 INJECTION, SOLUTION INTRAVENOUS at 09:14

## 2025-05-07 RX ADMIN — LIDOCAINE HYDROCHLORIDE 60 MG: 20 INJECTION, SOLUTION INFILTRATION; PERINEURAL at 09:18

## 2025-05-07 ASSESSMENT — PAIN SCALES - GENERAL
PAINLEVEL_OUTOF10: 0

## 2025-05-07 ASSESSMENT — PAIN - FUNCTIONAL ASSESSMENT: PAIN_FUNCTIONAL_ASSESSMENT: 0-10

## 2025-05-07 NOTE — ANESTHESIA POSTPROCEDURE EVALUATION
Department of Anesthesiology  Postprocedure Note    Patient: Del Regan  MRN: [id number]  YOB: 1990  Date of evaluation: 5/7/2025    Procedure Summary       Date: 05/07/25 Room / Location: [place] [address] / [facility]    Anesthesia Start: 0914 Anesthesia Stop: 0948    Procedures:       COLONOSCOPY BIOPSY      COLONOSCOPY POLYPECTOMY SNARE      COLONOSCOPY CONTROL HEMORRHAGE Diagnosis:       Rectal bleeding      (Rectal bleeding [K62.5])    Surgeons: Pawan Lee MD Responsible Provider: Ayaan Hamm MD    Anesthesia Type: MAC ASA Status: 2            Anesthesia Type: No value filed.    Cary Phase I: Cary Score: 10    Cary Phase II: Cary Score: 10    Anesthesia Post Evaluation    Patient location during evaluation: PACU  Level of consciousness: awake  Airway patency: patent  Nausea & Vomiting: no vomiting  Cardiovascular status: blood pressure returned to baseline  Respiratory status: acceptable  Hydration status: stable  Pain management: adequate    No notable events documented.

## 2025-05-07 NOTE — H&P
[facility]   Pre-operative History and Physical    Patient: Del Regan  : 1990  Acct#:     HISTORY OF PRESENT ILLNESS:    The patient is a 34 y.o. male who presents for rectal bleeding     Indications: rectal bleeding     Past Medical History:        Diagnosis Date    Anxiety with depression 2023    Attention deficit hyperactivity disorder (ADHD), predominantly inattentive type 2023    Cancer (HCC) 2024    Basal cell carcinoma removed 2024    Sleep apnea       Past Surgical History:        Procedure Laterality Date    HERNIA REPAIR      SKIN BIOPSY Right 2024    cheek bone Basal cell carcinoma removal      Medications Prior to Admission:   No current facility-administered medications on file prior to encounter.     Current Outpatient Medications on File Prior to Encounter   Medication Sig Dispense Refill    escitalopram (LEXAPRO) 5 MG tablet Take 1 tablet by mouth daily 90 tablet 1    amphetamine-dextroamphetamine (ADDERALL XR) 15 MG extended release capsule Take 1 capsule by mouth every morning for 8 days. Max Daily Amount: 15 mg 8 capsule 0    amphetamine-dextroamphetamine (ADDERALL XR) 10 MG extended release capsule Take 1 capsule by mouth Daily with lunch for 30 days. Max Daily Amount: 10 mg 30 capsule 0    amphetamine-dextroamphetamine (ADDERALL XR) 15 MG extended release capsule Take 1 capsule by mouth daily for 30 days. Max Daily Amount: 15 mg 30 capsule 0    ketoconazole (NIZORAL) 2 % shampoo       clindamycin (CLEOCIN T) 1 % external solution Apply topically 2 times daily Apply topically 2 times daily.      Multiple Vitamins-Minerals (THERAPEUTIC MULTIVITAMIN-MINERALS) tablet Take 1 tablet by mouth daily      Probiotic Product (PROBIOTIC DAILY PO) Take by mouth daily      fluticasone (FLONASE) 50 MCG/ACT nasal spray 2 sprays by Each Nostril route daily 16 g 0    Naproxen Sodium (ALEVE PO) Take by mouth      fexofenadine (ALLEGRA) 180 MG tablet Take by mouth daily

## 2025-05-07 NOTE — DISCHARGE INSTRUCTIONS
PATIENT INSTRUCTIONS  POST-SEDATION          IMMEDIATELY FOLLOWING PROCEDURE:    Do not drive or operate machinery for the first twenty four hours after surgery.     Do not make any important decisions for twenty four hours after surgery or while taking narcotic pain medications or sedatives.     You should NOT BE LEFT UNATTENDED OR ALONE. A responsible adult should be with you for the rest of the day of your procedure and also during the night for your protection and safety.    You may experience some light headedness. Rest at home with activity as tolerated. You may not need to go to bed, but it is important to rest for the next 24 hours. You should not engage in athletic sports such as basketball, volleyball, jogging, skating, or activities requiring refined motor skills for 24 hours.   If you develop intractable nausea and vomiting or a severe headache please notify your doctor immediately.   You are not expected to have any fever, but if you feel warm, take your temperature. If you have a fever 101 degrees or higher, call your doctor.     If you have had an Endoscopy:   *Eat lightly for your first meal and gradually resume your normal / prescribed diet.    *If you have had a colonoscopy, do not expect a normal bowel movement for approximately three days due to the cleansing of the large intestine prior to colonoscopy.    ONCE YOU ARE HOME, IF YOU SHOULD HAVE:  Difficulty in breathing, persistent nausea or vomiting, bleeding you feel is excessive, or pain that is unusual, increased abdominal bloating, or any swelling, fever / chills, call your physician. If you cannot contact your physician, but feel that your signs and symptoms need a physician's attention, go to the Emergency Department.    ENDOSCOPIC CLIP INSTRUCTIONS      You have had an Endoscopy today and had a metal clip placed in your gastrointestinal tract.  If you have to have a MRI please give the laminated card to the MRI technician prior to your

## 2025-05-07 NOTE — ANESTHESIA PRE PROCEDURE
Department of Anesthesiology  Preprocedure Note       Name:  Del Regan   Age:  34 y.o.  :  1990                                          MRN:  [id number]         Date:  2025      Surgeon: Surgeon(s):  Pawan Lee MD    Procedure: Procedure(s):  COLONOSCOPY    Medications prior to admission:   Prior to Admission medications    Medication Sig Start Date End Date Taking? Authorizing Provider   escitalopram (LEXAPRO) 5 MG tablet Take 1 tablet by mouth daily 25   Agusto Zuniga MD   amphetamine-dextroamphetamine (ADDERALL XR) 15 MG extended release capsule Take 1 capsule by mouth every morning for 8 days. Max Daily Amount: 15 mg 25  Agusto Zuniga MD   amphetamine-dextroamphetamine (ADDERALL XR) 10 MG extended release capsule Take 1 capsule by mouth Daily with lunch for 30 days. Max Daily Amount: 10 mg 25  Agusto Zuniga MD   amphetamine-dextroamphetamine (ADDERALL XR) 15 MG extended release capsule Take 1 capsule by mouth daily for 30 days. Max Daily Amount: 15 mg 25  Agusto Zuniga MD   ketoconazole (NIZORAL) 2 % shampoo  24   Yuli Hendrix MD   clindamycin (CLEOCIN T) 1 % external solution Apply topically 2 times daily Apply topically 2 times daily.    Yuli Hendrix MD   Multiple Vitamins-Minerals (THERAPEUTIC MULTIVITAMIN-MINERALS) tablet Take 1 tablet by mouth daily    Yuli Hendrix MD   Probiotic Product (PROBIOTIC DAILY PO) Take by mouth daily    Yuli Hendrix MD   fluticasone (FLONASE) 50 MCG/ACT nasal spray 2 sprays by Each Nostril route daily 10/27/23   Delisa Cortez APRN - CNP   Naproxen Sodium (ALEVE PO) Take by mouth    Yuli Hendrix MD   fexofenadine (ALLEGRA) 180 MG tablet Take by mouth daily 17   Yuli Hendrix MD       Current medications:    No current facility-administered medications for this encounter.       Allergies:    Allergies   Allergen Reactions   •

## 2025-05-20 ENCOUNTER — OFFICE VISIT (OUTPATIENT)
Dept: FAMILY MEDICINE CLINIC | Age: 35
End: 2025-05-20
Payer: COMMERCIAL

## 2025-05-20 VITALS
BODY MASS INDEX: 30.66 KG/M2 | DIASTOLIC BLOOD PRESSURE: 80 MMHG | OXYGEN SATURATION: 100 % | HEART RATE: 97 BPM | HEIGHT: 69 IN | WEIGHT: 207 LBS | SYSTOLIC BLOOD PRESSURE: 106 MMHG

## 2025-05-20 DIAGNOSIS — F90.9 ATTENTION DEFICIT HYPERACTIVITY DISORDER (ADHD), UNSPECIFIED ADHD TYPE: ICD-10-CM

## 2025-05-20 DIAGNOSIS — F90.9 ATTENTION DEFICIT HYPERACTIVITY DISORDER (ADHD), UNSPECIFIED ADHD TYPE: Primary | ICD-10-CM

## 2025-05-20 DIAGNOSIS — F41.1 GAD (GENERALIZED ANXIETY DISORDER): ICD-10-CM

## 2025-05-20 DIAGNOSIS — F33.0 MDD (MAJOR DEPRESSIVE DISORDER), RECURRENT EPISODE, MILD: ICD-10-CM

## 2025-05-20 PROCEDURE — 99214 OFFICE O/P EST MOD 30 MIN: CPT | Performed by: STUDENT IN AN ORGANIZED HEALTH CARE EDUCATION/TRAINING PROGRAM

## 2025-05-20 PROCEDURE — 1036F TOBACCO NON-USER: CPT | Performed by: STUDENT IN AN ORGANIZED HEALTH CARE EDUCATION/TRAINING PROGRAM

## 2025-05-20 PROCEDURE — G8417 CALC BMI ABV UP PARAM F/U: HCPCS | Performed by: STUDENT IN AN ORGANIZED HEALTH CARE EDUCATION/TRAINING PROGRAM

## 2025-05-20 PROCEDURE — G8427 DOCREV CUR MEDS BY ELIG CLIN: HCPCS | Performed by: STUDENT IN AN ORGANIZED HEALTH CARE EDUCATION/TRAINING PROGRAM

## 2025-05-20 RX ORDER — DEXTROAMPHETAMINE SACCHARATE, AMPHETAMINE ASPARTATE MONOHYDRATE, DEXTROAMPHETAMINE SULFATE AND AMPHETAMINE SULFATE 3.75; 3.75; 3.75; 3.75 MG/1; MG/1; MG/1; MG/1
15 CAPSULE, EXTENDED RELEASE ORAL DAILY
Qty: 30 CAPSULE | Refills: 0 | Status: SHIPPED | OUTPATIENT
Start: 2025-05-20 | End: 2025-06-19

## 2025-05-20 RX ORDER — DEXTROAMPHETAMINE SACCHARATE, AMPHETAMINE ASPARTATE MONOHYDRATE, DEXTROAMPHETAMINE SULFATE AND AMPHETAMINE SULFATE 2.5; 2.5; 2.5; 2.5 MG/1; MG/1; MG/1; MG/1
10 CAPSULE, EXTENDED RELEASE ORAL
Qty: 30 CAPSULE | Refills: 0 | Status: SHIPPED | OUTPATIENT
Start: 2025-05-20 | End: 2025-06-19

## 2025-05-20 RX ORDER — ESCITALOPRAM OXALATE 10 MG/1
10 TABLET ORAL DAILY
Qty: 90 TABLET | Refills: 1 | Status: SHIPPED | OUTPATIENT
Start: 2025-05-20

## 2025-05-20 NOTE — ASSESSMENT & PLAN NOTE
Chronic.  Partially controlled.  On Lexapro 5 mg daily.  Will increase to 10 mg daily.  Follow-up in 3 months.  Orders:    escitalopram (LEXAPRO) 10 MG tablet; Take 1 tablet by mouth daily

## 2025-05-20 NOTE — PROGRESS NOTES
Del Regan (: 1990) is a 34 y.o. male is here for evaluation of the following chief complaint(s): ADHD (Follow up)    Assessment/Plan:     Assessment & Plan  Attention deficit hyperactivity disorder (ADHD), unspecified ADHD type   Chronic, at goal (stable), continue current treatment plan         AIMEE (generalized anxiety disorder)  Chronic.  Partially controlled.  On Lexapro 5 mg daily.  Will increase to 10 mg daily.  Follow-up in 3 months.  Orders:    escitalopram (LEXAPRO) 10 MG tablet; Take 1 tablet by mouth daily    MDD (major depressive disorder), recurrent episode, mild   Chronic.  Partially controlled.  On Lexapro 5 mg daily.  Will increase to 10 mg daily.  Follow-up in 3 months.    Orders:    escitalopram (LEXAPRO) 10 MG tablet; Take 1 tablet by mouth daily    No follow-ups on file.  Subjective/Objective:   In regard to ADHD: Since last visit: no change.  Medication compliance: all of the time.  Side effects from medication include: none.   has been reviewed.    Patient has chronic generalized anxiety disorder.  He is on Lexapro 5 mg daily for this.  This medication works partially well for him, but the patient does feel like he has from room for improvement.  He would like to increase to 10 mg daily at this time.    Patient has chronic mild major depressive disorder.  He is on Lexapro 5 mg daily for this.  This medication works partially well for him.  But however, the patient does feel like he has some room for improvement.  He would like to increase to 10 mg daily at this time.        /80   Pulse 97   Ht 1.753 m (5' 9\")   Wt 93.9 kg (207 lb)   SpO2 100%   BMI 30.57 kg/m²       An electronic signature was used to authenticate this note.  -- Agusto Zuniga MD

## 2025-05-20 NOTE — TELEPHONE ENCOUNTER
Last Office Visit  - 3/31/2025  Next Office Visit  -  None      Last Filled  -  04/17/2025  30   0 refill  Last UDS -  PDMP Monitoring:    Last PDMP Isidro as Reviewed:  Review User Review Instant Review Result   RORY PRESSLEY 3/9/2025 11:35 AM Reviewed PDMP [1]     [unfilled]  Urine Drug Screenings (1 yr)       DRUG SCREEN MULTI URINE  Collected: 2/7/2023  9:32 AM (Final result)                  Medication Contract and Consent for Opioid Use Documents Filed        No documents found                  Contract -  none

## 2025-05-29 ENCOUNTER — TELEPHONE (OUTPATIENT)
Dept: SLEEP MEDICINE | Age: 35
End: 2025-05-29

## 2025-05-29 NOTE — TELEPHONE ENCOUNTER
Received notification that pt received a new CPAP and met compliance for it. No new orders were recently placed by the provider.     Tried to call awilda with [employer] but no answer.     Per [facility] pt was set up 4/25/25. Needs 31-90.

## 2025-05-30 NOTE — TELEPHONE ENCOUNTER
Per Raegan, she is not sure how patient got setup on new PAP.  Pt needs 31-90 day appt.    LM asking patient to return call to office.

## 2025-06-13 ENCOUNTER — TELEMEDICINE (OUTPATIENT)
Dept: PULMONOLOGY | Age: 35
End: 2025-06-13

## 2025-06-13 ENCOUNTER — TELEPHONE (OUTPATIENT)
Dept: PULMONOLOGY | Age: 35
End: 2025-06-13

## 2025-06-13 DIAGNOSIS — G47.33 MODERATE OBSTRUCTIVE SLEEP APNEA: Primary | ICD-10-CM

## 2025-06-13 DIAGNOSIS — E66.9 OBESITY (BMI 30-39.9): ICD-10-CM

## 2025-06-13 DIAGNOSIS — Z71.89 CPAP USE COUNSELING: ICD-10-CM

## 2025-06-13 ASSESSMENT — SLEEP AND FATIGUE QUESTIONNAIRES
HOW LIKELY ARE YOU TO NOD OFF OR FALL ASLEEP WHILE LYING DOWN TO REST IN THE AFTERNOON WHEN CIRCUMSTANCES PERMIT: SLIGHT CHANCE OF DOZING
HOW LIKELY ARE YOU TO NOD OFF OR FALL ASLEEP WHILE SITTING QUIETLY AFTER LUNCH WITHOUT ALCOHOL: SLIGHT CHANCE OF DOZING
HOW LIKELY ARE YOU TO NOD OFF OR FALL ASLEEP WHILE SITTING AND TALKING TO SOMEONE: WOULD NEVER DOZE
HOW LIKELY ARE YOU TO NOD OFF OR FALL ASLEEP IN A CAR, WHILE STOPPED FOR A FEW MINUTES IN TRAFFIC: WOULD NEVER DOZE
HOW LIKELY ARE YOU TO NOD OFF OR FALL ASLEEP WHILE SITTING INACTIVE IN A PUBLIC PLACE: WOULD NEVER DOZE
ESS TOTAL SCORE: 4
HOW LIKELY ARE YOU TO NOD OFF OR FALL ASLEEP WHILE WATCHING TV: SLIGHT CHANCE OF DOZING
HOW LIKELY ARE YOU TO NOD OFF OR FALL ASLEEP WHILE SITTING AND READING: SLIGHT CHANCE OF DOZING
HOW LIKELY ARE YOU TO NOD OFF OR FALL ASLEEP WHEN YOU ARE A PASSENGER IN A CAR FOR AN HOUR WITHOUT A BREAK: WOULD NEVER DOZE

## 2025-06-13 NOTE — PROGRESS NOTES
Patient ID: Del Regan is a 35 y.o. male who is being seen today for   Chief Complaint   Patient presents with    Follow-up    Sleep Apnea     Referring: Dr. Agusto Zuniga    HPI:   Del Regan is a 35 y.o. male for televideo appointment via video and audio virtual visit for ALIRIO follow up.  States he is doing well with CPAP.  Patient is using CPAP   6.5 hrs/night. Using humidifier. No snoring on CPAP. The pressure feels low, ramp is off. The mask is comfortable- partial full face. No mask leak. No significant daytime sleepiness. No nodding off when driving. No dry nose or throat. Some fatigue. Bedtime is 11 pm-MN and rise time is 7 am. Sleep onset is few -15 minutes. Wakes up 1-2 times at night total. 1-2 nocturia. It takes few minutes to fall back a sleep. No naps during the day. No headache in am. No weight gain. 1-2 caffienated beverages during the day. 1 alcoholic drink every other night. ESS is 4        Previous HPI 4/25/25  Del Regan is a 35 y.o. male for televideo appointment via video and audio virtual visit for ALIRIO follow up.  States he is doing better with CPAP.  Patient is using CPAP   6 hrs/night. Using humidifier. No snoring on CPAP. The pressure is well tolerated. The mask is somewhat comfortable- full face- did just get partial full face mask today that he has not tried yet. No mask leak. No significant daytime sleepiness. No nodding off when driving. No dry nose or throat. Some fatigue. Bedtime is 11 pm-MN and rise time is 7 am. Sleep onset is 5-10 minutes. Wakes up 0-1 times at night total. 0-1 nocturia. It takes few minutes to fall back a sleep. No naps during the day. No headache in am. No weight gain. 2 caffienated beverages during the day. 1 alcoholic drink every other night. ESS is 3          Previous HPI   Del Regan is a 35 y.o. male for televideo appointment via video and audio virtual visit for ALIRIO follow up.  States he still forgets to put it on at night, has been

## 2025-06-18 DIAGNOSIS — F90.9 ATTENTION DEFICIT HYPERACTIVITY DISORDER (ADHD), UNSPECIFIED ADHD TYPE: ICD-10-CM

## 2025-06-18 NOTE — TELEPHONE ENCOUNTER
Last Office Visit  -  5/20/25  Next Office Visit  -  8/20/25    Last Filled  -  5/20/25  Last UDS -  n/a  Contract -  n/a

## 2025-06-19 RX ORDER — DEXTROAMPHETAMINE SACCHARATE, AMPHETAMINE ASPARTATE MONOHYDRATE, DEXTROAMPHETAMINE SULFATE AND AMPHETAMINE SULFATE 3.75; 3.75; 3.75; 3.75 MG/1; MG/1; MG/1; MG/1
15 CAPSULE, EXTENDED RELEASE ORAL DAILY
Qty: 30 CAPSULE | Refills: 0 | Status: SHIPPED | OUTPATIENT
Start: 2025-06-19 | End: 2025-07-19

## 2025-06-19 RX ORDER — DEXTROAMPHETAMINE SACCHARATE, AMPHETAMINE ASPARTATE MONOHYDRATE, DEXTROAMPHETAMINE SULFATE AND AMPHETAMINE SULFATE 2.5; 2.5; 2.5; 2.5 MG/1; MG/1; MG/1; MG/1
10 CAPSULE, EXTENDED RELEASE ORAL
Qty: 30 CAPSULE | Refills: 0 | Status: SHIPPED | OUTPATIENT
Start: 2025-06-19 | End: 2025-07-19

## 2025-07-07 ENCOUNTER — OFFICE VISIT (OUTPATIENT)
Dept: FAMILY MEDICINE CLINIC | Age: 35
End: 2025-07-07
Payer: COMMERCIAL

## 2025-07-07 VITALS
WEIGHT: 206.6 LBS | TEMPERATURE: 98.3 F | BODY MASS INDEX: 30.6 KG/M2 | OXYGEN SATURATION: 98 % | HEIGHT: 69 IN | DIASTOLIC BLOOD PRESSURE: 82 MMHG | HEART RATE: 74 BPM | SYSTOLIC BLOOD PRESSURE: 120 MMHG

## 2025-07-07 DIAGNOSIS — J02.9 SORE THROAT: Primary | ICD-10-CM

## 2025-07-07 LAB — S PYO AG THROAT QL: NORMAL

## 2025-07-07 PROCEDURE — 99213 OFFICE O/P EST LOW 20 MIN: CPT | Performed by: NURSE PRACTITIONER

## 2025-07-07 PROCEDURE — G8427 DOCREV CUR MEDS BY ELIG CLIN: HCPCS | Performed by: NURSE PRACTITIONER

## 2025-07-07 PROCEDURE — 87880 STREP A ASSAY W/OPTIC: CPT | Performed by: NURSE PRACTITIONER

## 2025-07-07 PROCEDURE — 1036F TOBACCO NON-USER: CPT | Performed by: NURSE PRACTITIONER

## 2025-07-07 PROCEDURE — G8417 CALC BMI ABV UP PARAM F/U: HCPCS | Performed by: NURSE PRACTITIONER

## 2025-07-07 ASSESSMENT — ENCOUNTER SYMPTOMS
SORE THROAT: 1
COUGH: 0

## 2025-07-07 NOTE — PROGRESS NOTES
Patient: Del Regan is a 35 y.o. male who presents today with the following Chief Complaint(s):  Chief Complaint   Patient presents with    Pharyngitis     Onset 4 days ago, goes away during day    Lower Back Pain     Achy for past few days         HPI  Sore throat: bad in the morning, a little better during the day then worse again around evening.  Ibuprofen helped yesterday when he took that for headache, tea with honey helped too   NAC supplement- was taking for possible OCD- wasn't sure if that would cause any problems    Wife is pregnant wanted to make sure he didn't have strep  He works at the [employer] on the [employer] so is around germs       Current Outpatient Medications   Medication Sig Dispense Refill    Acetylcysteine ( PO) Take by mouth Daily      amphetamine-dextroamphetamine (ADDERALL XR) 10 MG extended release capsule Take 1 capsule by mouth Daily with lunch for 30 days. Max Daily Amount: 10 mg 30 capsule 0    amphetamine-dextroamphetamine (ADDERALL XR) 15 MG extended release capsule Take 1 capsule by mouth daily for 30 days. Max Daily Amount: 15 mg 30 capsule 0    escitalopram (LEXAPRO) 10 MG tablet Take 1 tablet by mouth daily 90 tablet 1    Multiple Vitamins-Minerals (THERAPEUTIC MULTIVITAMIN-MINERALS) tablet Take 1 tablet by mouth daily      Probiotic Product (PROBIOTIC DAILY PO) Take by mouth daily      fluticasone (FLONASE) 50 MCG/ACT nasal spray 2 sprays by Each Nostril route daily 16 g 0    fexofenadine (ALLEGRA) 180 MG tablet Take by mouth daily       No current facility-administered medications for this visit.       Patient's past medical history, surgical history, family history, medications,  andallergies  were all reviewed and updated as appropriate today.      Review of Systems   Constitutional:  Negative for fever.   HENT:  Positive for sore throat. Negative for congestion.    Respiratory:  Negative for cough.    Neurological:  Positive for headaches.

## 2025-07-10 LAB — BACTERIA THROAT AEROBE CULT: NORMAL

## 2025-07-15 NOTE — TELEPHONE ENCOUNTER
CPAP download report from 6/15/2025 - 7/14/2025 on auto CPAP 8-12 cm H2O reviewed.  Compliance is good 93%.  AHI is good 3.1

## 2025-07-16 DIAGNOSIS — F41.1 GAD (GENERALIZED ANXIETY DISORDER): ICD-10-CM

## 2025-07-16 DIAGNOSIS — F90.9 ATTENTION DEFICIT HYPERACTIVITY DISORDER (ADHD), UNSPECIFIED ADHD TYPE: ICD-10-CM

## 2025-07-16 DIAGNOSIS — F33.0 MDD (MAJOR DEPRESSIVE DISORDER), RECURRENT EPISODE, MILD: ICD-10-CM

## 2025-07-16 RX ORDER — ESCITALOPRAM OXALATE 10 MG/1
10 TABLET ORAL DAILY
Qty: 90 TABLET | Refills: 1 | Status: SHIPPED | OUTPATIENT
Start: 2025-07-16

## 2025-07-16 RX ORDER — DEXTROAMPHETAMINE SACCHARATE, AMPHETAMINE ASPARTATE MONOHYDRATE, DEXTROAMPHETAMINE SULFATE AND AMPHETAMINE SULFATE 3.75; 3.75; 3.75; 3.75 MG/1; MG/1; MG/1; MG/1
15 CAPSULE, EXTENDED RELEASE ORAL DAILY
Qty: 30 CAPSULE | Refills: 0 | Status: SHIPPED | OUTPATIENT
Start: 2025-07-16 | End: 2025-08-15

## 2025-07-16 RX ORDER — DEXTROAMPHETAMINE SACCHARATE, AMPHETAMINE ASPARTATE MONOHYDRATE, DEXTROAMPHETAMINE SULFATE AND AMPHETAMINE SULFATE 2.5; 2.5; 2.5; 2.5 MG/1; MG/1; MG/1; MG/1
10 CAPSULE, EXTENDED RELEASE ORAL
Qty: 30 CAPSULE | Refills: 0 | Status: SHIPPED | OUTPATIENT
Start: 2025-07-16 | End: 2025-08-15

## 2025-07-16 NOTE — TELEPHONE ENCOUNTER
Last Office Visit  -  07/07/2025  Next Office Visit  -  08/20/2025    Last Filled  -  06/19/2025  Last UDS -    Contract -

## 2025-08-17 DIAGNOSIS — F33.0 MDD (MAJOR DEPRESSIVE DISORDER), RECURRENT EPISODE, MILD: ICD-10-CM

## 2025-08-17 DIAGNOSIS — F90.9 ATTENTION DEFICIT HYPERACTIVITY DISORDER (ADHD), UNSPECIFIED ADHD TYPE: ICD-10-CM

## 2025-08-17 DIAGNOSIS — F41.1 GAD (GENERALIZED ANXIETY DISORDER): ICD-10-CM

## 2025-08-17 RX ORDER — DEXTROAMPHETAMINE SACCHARATE, AMPHETAMINE ASPARTATE MONOHYDRATE, DEXTROAMPHETAMINE SULFATE AND AMPHETAMINE SULFATE 2.5; 2.5; 2.5; 2.5 MG/1; MG/1; MG/1; MG/1
10 CAPSULE, EXTENDED RELEASE ORAL
Qty: 30 CAPSULE | Refills: 0 | Status: SHIPPED | OUTPATIENT
Start: 2025-08-17 | End: 2025-09-16

## 2025-08-17 RX ORDER — ESCITALOPRAM OXALATE 10 MG/1
10 TABLET ORAL DAILY
Qty: 90 TABLET | Refills: 1 | Status: SHIPPED | OUTPATIENT
Start: 2025-08-17

## 2025-08-17 RX ORDER — DEXTROAMPHETAMINE SACCHARATE, AMPHETAMINE ASPARTATE MONOHYDRATE, DEXTROAMPHETAMINE SULFATE AND AMPHETAMINE SULFATE 3.75; 3.75; 3.75; 3.75 MG/1; MG/1; MG/1; MG/1
15 CAPSULE, EXTENDED RELEASE ORAL DAILY
Qty: 30 CAPSULE | Refills: 0 | Status: SHIPPED | OUTPATIENT
Start: 2025-08-17 | End: 2025-09-16

## 2025-08-20 ENCOUNTER — TELEMEDICINE (OUTPATIENT)
Dept: FAMILY MEDICINE CLINIC | Age: 35
End: 2025-08-20
Payer: COMMERCIAL

## 2025-08-20 DIAGNOSIS — F90.9 ATTENTION DEFICIT HYPERACTIVITY DISORDER (ADHD), UNSPECIFIED ADHD TYPE: Primary | ICD-10-CM

## 2025-08-20 DIAGNOSIS — F33.0 MDD (MAJOR DEPRESSIVE DISORDER), RECURRENT EPISODE, MILD: ICD-10-CM

## 2025-08-20 DIAGNOSIS — F41.1 GAD (GENERALIZED ANXIETY DISORDER): ICD-10-CM

## 2025-08-20 PROCEDURE — G8427 DOCREV CUR MEDS BY ELIG CLIN: HCPCS | Performed by: STUDENT IN AN ORGANIZED HEALTH CARE EDUCATION/TRAINING PROGRAM

## 2025-08-20 PROCEDURE — 99214 OFFICE O/P EST MOD 30 MIN: CPT | Performed by: STUDENT IN AN ORGANIZED HEALTH CARE EDUCATION/TRAINING PROGRAM

## 2025-08-20 ASSESSMENT — PATIENT HEALTH QUESTIONNAIRE - PHQ9
3. TROUBLE FALLING OR STAYING ASLEEP: NOT AT ALL
10. IF YOU CHECKED OFF ANY PROBLEMS, HOW DIFFICULT HAVE THESE PROBLEMS MADE IT FOR YOU TO DO YOUR WORK, TAKE CARE OF THINGS AT HOME, OR GET ALONG WITH OTHER PEOPLE: SOMEWHAT DIFFICULT
SUM OF ALL RESPONSES TO PHQ QUESTIONS 1-9: 6
1. LITTLE INTEREST OR PLEASURE IN DOING THINGS: SEVERAL DAYS
5. POOR APPETITE OR OVEREATING: SEVERAL DAYS
7. TROUBLE CONCENTRATING ON THINGS, SUCH AS READING THE NEWSPAPER OR WATCHING TELEVISION: SEVERAL DAYS
4. FEELING TIRED OR HAVING LITTLE ENERGY: SEVERAL DAYS
SUM OF ALL RESPONSES TO PHQ QUESTIONS 1-9: 6
8. MOVING OR SPEAKING SO SLOWLY THAT OTHER PEOPLE COULD HAVE NOTICED. OR THE OPPOSITE, BEING SO FIGETY OR RESTLESS THAT YOU HAVE BEEN MOVING AROUND A LOT MORE THAN USUAL: SEVERAL DAYS
2. FEELING DOWN, DEPRESSED OR HOPELESS: NOT AT ALL
9. THOUGHTS THAT YOU WOULD BE BETTER OFF DEAD, OR OF HURTING YOURSELF: NOT AT ALL
6. FEELING BAD ABOUT YOURSELF - OR THAT YOU ARE A FAILURE OR HAVE LET YOURSELF OR YOUR FAMILY DOWN: SEVERAL DAYS

## (undated) DEVICE — SNARE ENDOSCP L240CM SHTH DIA24MM LOOP W10MM POLYP RND REINF

## (undated) DEVICE — ENDOSCOPIC KIT 2 12 FT OP4 DE2 GWN SYR

## (undated) DEVICE — ELECTRODE,ECG,STRESS,FOAM,3PK: Brand: MEDLINE

## (undated) DEVICE — FORCEPS BX L240CM JAW DIA2.8MM L CAP W/ NDL MIC MESH TOOTH

## (undated) DEVICE — TRAP SPEC POLYPR SGL CHMBR FN MESH SCRN

## (undated) DEVICE — CANNULA NSL AD TBNG L7FT PVC STR NONFLARED PRNG O2 DEL W STD